# Patient Record
Sex: FEMALE | Race: WHITE | NOT HISPANIC OR LATINO | Employment: FULL TIME | URBAN - METROPOLITAN AREA
[De-identification: names, ages, dates, MRNs, and addresses within clinical notes are randomized per-mention and may not be internally consistent; named-entity substitution may affect disease eponyms.]

---

## 2019-04-18 ENCOUNTER — OFFICE VISIT (OUTPATIENT)
Dept: OBSTETRICS AND GYNECOLOGY | Facility: CLINIC | Age: 29
End: 2019-04-18
Payer: COMMERCIAL

## 2019-04-18 VITALS
BODY MASS INDEX: 26.72 KG/M2 | HEIGHT: 66 IN | SYSTOLIC BLOOD PRESSURE: 112 MMHG | DIASTOLIC BLOOD PRESSURE: 63 MMHG | WEIGHT: 166.25 LBS

## 2019-04-18 DIAGNOSIS — N92.1 BREAKTHROUGH BLEEDING ON BIRTH CONTROL PILLS: Primary | ICD-10-CM

## 2019-04-18 DIAGNOSIS — Z30.41 SURVEILLANCE OF PREVIOUSLY PRESCRIBED CONTRACEPTIVE PILL: ICD-10-CM

## 2019-04-18 LAB
B-HCG UR QL: NEGATIVE
CTP QC/QA: YES

## 2019-04-18 PROCEDURE — 99999 PR PBB SHADOW E&M-NEW PATIENT-LVL III: ICD-10-PCS | Mod: PBBFAC,,, | Performed by: NURSE PRACTITIONER

## 2019-04-18 PROCEDURE — 99203 PR OFFICE/OUTPT VISIT, NEW, LEVL III, 30-44 MIN: ICD-10-PCS | Mod: 25,S$GLB,, | Performed by: NURSE PRACTITIONER

## 2019-04-18 PROCEDURE — 81025 URINE PREGNANCY TEST: CPT | Mod: S$GLB,,, | Performed by: NURSE PRACTITIONER

## 2019-04-18 PROCEDURE — 81025 POCT URINE PREGNANCY: ICD-10-PCS | Mod: S$GLB,,, | Performed by: NURSE PRACTITIONER

## 2019-04-18 PROCEDURE — 3008F PR BODY MASS INDEX (BMI) DOCUMENTED: ICD-10-PCS | Mod: CPTII,S$GLB,, | Performed by: NURSE PRACTITIONER

## 2019-04-18 PROCEDURE — 99203 OFFICE O/P NEW LOW 30 MIN: CPT | Mod: 25,S$GLB,, | Performed by: NURSE PRACTITIONER

## 2019-04-18 PROCEDURE — 3008F BODY MASS INDEX DOCD: CPT | Mod: CPTII,S$GLB,, | Performed by: NURSE PRACTITIONER

## 2019-04-18 PROCEDURE — 99999 PR PBB SHADOW E&M-NEW PATIENT-LVL III: CPT | Mod: PBBFAC,,, | Performed by: NURSE PRACTITIONER

## 2019-04-18 RX ORDER — DROSPIRENONE AND ETHINYL ESTRADIOL 0.02-3(28)
1 KIT ORAL DAILY
Qty: 90 TABLET | Refills: 1 | Status: SHIPPED | OUTPATIENT
Start: 2019-04-18 | End: 2019-08-01

## 2019-04-18 RX ORDER — DEXTROAMPHETAMINE SACCHARATE, AMPHETAMINE ASPARTATE, DEXTROAMPHETAMINE SULFATE AND AMPHETAMINE SULFATE 5; 5; 5; 5 MG/1; MG/1; MG/1; MG/1
TABLET ORAL
Refills: 0 | COMMUNITY
Start: 2019-04-08 | End: 2021-12-17

## 2019-04-18 NOTE — PROGRESS NOTES
"  CC: BTB on pills    HPI: Pt is a 28 y.o.  female who presents for irregular bleeding with OCPs. She has been on pills since she was 19 years old. Currently on Azra and has been for about 1.5 years now. She is currently on the fourth week of pills in her pack. Started bleeding last week "like a period". She denies missing any pills in her pack. She is . She does not want STD testing today. Bleeding has stopped today. She has had light BTB before but this episode was heavier, was wearing one tampon/day. Of note, she did skip her placebo pills in the months of Jan, Feb, and March to skip her period. She got  in March and did not want her period during her honeymoon. She is questioning if that is related to her BTB. She is interested in [facility] She is due for her annual in August. She is requesting refills of her OCPs until then.     ROS:  GENERAL: Feeling well overall. Denies fever or chills.   SKIN: Denies rash or lesions.   HEAD: Denies head injury or headache.   NODES: Denies enlarged lymph nodes.   CHEST: Denies chest pain or shortness of breath.   CARDIOVASCULAR: Denies palpitations or left sided chest pain.   ABDOMEN: No abdominal pain, constipation, diarrhea, nausea, vomiting or rectal bleeding.   URINARY: No dysuria, hematuria, or burning on urination.  REPRODUCTIVE: See HPI.   BREASTS: Denies pain, lumps, or nipple discharge.   HEMATOLOGIC: No easy bruisability or excessive bleeding.   MUSCULOSKELETAL: Denies joint pain or swelling.   NEUROLOGIC: Denies syncope or weakness.   PSYCHIATRIC: Denies depression, anxiety or mood swings.    PE:   APPEARANCE: Well nourished, well developed, White female in no acute distress.  PELVIC: deferred, talk only    Diagnosis:  1. Breakthrough bleeding on birth control pills    2. Surveillance of previously prescribed contraceptive pill        Plan:     Orders Placed This Encounter    POCT urine pregnancy    " drospirenone-ethinyl estradiol (RACHEL) 3-0.02 mg per tablet     1. D/w pt BTB is most likely from skipping placebo pills the last three months. Pt chooses to stay on Rachel and will not skip placebo pills for consecutive months to avoid this in the future.   2. OCPs refilled, due for annual in August  3. Signed up for [facility]  The use of the oral contraceptive has been fully discussed with the patient. This includes the proper method to initiate and continue the pills, the need for regular compliance to ensure adequate contraceptive effect, the physiology which make the pill effective, the instructions for what to do in event of a missed pill, and warnings about anticipated minor side effects such as breakthrough spotting, nausea, breast tenderness, weight changes, acne, headaches, etc.  She has been told of the more serious potential side effects such as MI, stroke, and deep vein thrombosis, all of which are very unlikely.  She has been asked to report any signs of such serious problems immediately.  She should back up the pill with a condom during any cycle in which antibiotics are prescribed, and during the first cycle as well. The need for additional protection, such as a condom, to prevent exposure to sexually transmitted diseases has also been discussed- the patient has been clearly reminded that OCP's cannot protect her against diseases such as HIV and others. She understands and wishes to take the medication as prescribed.     Follow-up in August for MELITON

## 2019-04-21 ENCOUNTER — PATIENT MESSAGE (OUTPATIENT)
Dept: OBSTETRICS AND GYNECOLOGY | Facility: CLINIC | Age: 29
End: 2019-04-21

## 2019-06-16 ENCOUNTER — PATIENT MESSAGE (OUTPATIENT)
Dept: OBSTETRICS AND GYNECOLOGY | Facility: CLINIC | Age: 29
End: 2019-06-16

## 2019-07-05 ENCOUNTER — TELEPHONE (OUTPATIENT)
Dept: INTERNAL MEDICINE | Facility: CLINIC | Age: 29
End: 2019-07-05

## 2019-07-05 ENCOUNTER — TELEPHONE (OUTPATIENT)
Dept: PSYCHIATRY | Facility: CLINIC | Age: 29
End: 2019-07-05

## 2019-07-05 NOTE — TELEPHONE ENCOUNTER
----- Message from Destiney Wagner sent at 7/5/2019  4:04 PM CDT -----  Contact: [facility]  Appointment Request From: Patsy Jansen    With Provider: Debbie Stearns    Preferred Date Range: 7/5/2019 - 8/2/2019    Preferred Times: Thursday Afternoon, Friday Afternoon    Reason for visit: Looking for new Psychiatrist    Comments:  I am looking to make adjustments to my current medication (adderall) and lifestyle in order to better manage my mental health and depression

## 2019-07-05 NOTE — TELEPHONE ENCOUNTER
----- Message from Destiney Wagner sent at 7/5/2019  3:45 PM CDT -----  Contact: [facility]  Appointment Request From: Patsy Jansen    With Provider: Claudia Pan or any psychiatry specialist    Preferred Date Range: 7/5/2019 - 7/5/2019    Preferred Times: Any time    Reason for visit: Looking to find a new Psychiatrist    Comments:  Lifestyle feedback and mental health focused adjustments with current medication and lifestyle

## 2019-07-24 ENCOUNTER — PATIENT MESSAGE (OUTPATIENT)
Dept: OBSTETRICS AND GYNECOLOGY | Facility: CLINIC | Age: 29
End: 2019-07-24

## 2019-07-25 ENCOUNTER — TELEPHONE (OUTPATIENT)
Dept: OBSTETRICS AND GYNECOLOGY | Facility: CLINIC | Age: 29
End: 2019-07-25

## 2019-07-25 DIAGNOSIS — R53.83 FATIGUE, UNSPECIFIED TYPE: Primary | ICD-10-CM

## 2019-07-25 DIAGNOSIS — R42 LIGHTHEADED: Primary | ICD-10-CM

## 2019-07-30 ENCOUNTER — LAB VISIT (OUTPATIENT)
Dept: LAB | Facility: OTHER | Age: 29
End: 2019-07-30
Payer: COMMERCIAL

## 2019-07-30 DIAGNOSIS — R42 LIGHTHEADED: ICD-10-CM

## 2019-07-30 DIAGNOSIS — R53.83 FATIGUE, UNSPECIFIED TYPE: ICD-10-CM

## 2019-07-30 LAB
25(OH)D3+25(OH)D2 SERPL-MCNC: 33 NG/ML (ref 30–96)
BASOPHILS # BLD AUTO: 0.06 K/UL (ref 0–0.2)
BASOPHILS NFR BLD: 1 % (ref 0–1.9)
DIFFERENTIAL METHOD: ABNORMAL
EOSINOPHIL # BLD AUTO: 0.2 K/UL (ref 0–0.5)
EOSINOPHIL NFR BLD: 3.8 % (ref 0–8)
ERYTHROCYTE [DISTWIDTH] IN BLOOD BY AUTOMATED COUNT: 12.2 % (ref 11.5–14.5)
HCT VFR BLD AUTO: 41 % (ref 37–48.5)
HGB BLD-MCNC: 13.8 G/DL (ref 12–16)
IMM GRANULOCYTES # BLD AUTO: 0.01 K/UL (ref 0–0.04)
IMM GRANULOCYTES NFR BLD AUTO: 0.2 % (ref 0–0.5)
IRON SERPL-MCNC: 76 UG/DL (ref 30–160)
LYMPHOCYTES # BLD AUTO: 3.4 K/UL (ref 1–4.8)
LYMPHOCYTES NFR BLD: 53.8 % (ref 18–48)
MCH RBC QN AUTO: 30.1 PG (ref 27–31)
MCHC RBC AUTO-ENTMCNC: 33.7 G/DL (ref 32–36)
MCV RBC AUTO: 90 FL (ref 82–98)
MONOCYTES # BLD AUTO: 0.6 K/UL (ref 0.3–1)
MONOCYTES NFR BLD: 9.3 % (ref 4–15)
NEUTROPHILS # BLD AUTO: 2 K/UL (ref 1.8–7.7)
NEUTROPHILS NFR BLD: 31.9 % (ref 38–73)
NRBC BLD-RTO: 0 /100 WBC
PLATELET # BLD AUTO: 276 K/UL (ref 150–350)
PMV BLD AUTO: 9.9 FL (ref 9.2–12.9)
RBC # BLD AUTO: 4.58 M/UL (ref 4–5.4)
SATURATED IRON: 21 % (ref 20–50)
TOTAL IRON BINDING CAPACITY: 354 UG/DL (ref 250–450)
TRANSFERRIN SERPL-MCNC: 239 MG/DL (ref 200–375)
WBC # BLD AUTO: 6.26 K/UL (ref 3.9–12.7)

## 2019-07-30 PROCEDURE — 83540 ASSAY OF IRON: CPT

## 2019-07-30 PROCEDURE — 85025 COMPLETE CBC W/AUTO DIFF WBC: CPT

## 2019-07-30 PROCEDURE — 82306 VITAMIN D 25 HYDROXY: CPT

## 2019-07-30 PROCEDURE — 36415 COLL VENOUS BLD VENIPUNCTURE: CPT

## 2019-08-01 ENCOUNTER — OFFICE VISIT (OUTPATIENT)
Dept: OBSTETRICS AND GYNECOLOGY | Facility: CLINIC | Age: 29
End: 2019-08-01
Payer: COMMERCIAL

## 2019-08-01 VITALS
DIASTOLIC BLOOD PRESSURE: 74 MMHG | SYSTOLIC BLOOD PRESSURE: 122 MMHG | WEIGHT: 161.81 LBS | HEIGHT: 66 IN | BODY MASS INDEX: 26.01 KG/M2

## 2019-08-01 DIAGNOSIS — Z30.09 ENCOUNTER FOR OTHER GENERAL COUNSELING AND ADVICE ON CONTRACEPTION: Primary | ICD-10-CM

## 2019-08-01 DIAGNOSIS — R42 DIZZY SPELLS: ICD-10-CM

## 2019-08-01 PROCEDURE — 99213 OFFICE O/P EST LOW 20 MIN: CPT | Mod: S$GLB,,, | Performed by: NURSE PRACTITIONER

## 2019-08-01 PROCEDURE — 99999 PR PBB SHADOW E&M-EST. PATIENT-LVL III: CPT | Mod: PBBFAC,,, | Performed by: NURSE PRACTITIONER

## 2019-08-01 PROCEDURE — 3008F PR BODY MASS INDEX (BMI) DOCUMENTED: ICD-10-PCS | Mod: CPTII,S$GLB,, | Performed by: NURSE PRACTITIONER

## 2019-08-01 PROCEDURE — 99999 PR PBB SHADOW E&M-EST. PATIENT-LVL III: ICD-10-PCS | Mod: PBBFAC,,, | Performed by: NURSE PRACTITIONER

## 2019-08-01 PROCEDURE — 3008F BODY MASS INDEX DOCD: CPT | Mod: CPTII,S$GLB,, | Performed by: NURSE PRACTITIONER

## 2019-08-01 PROCEDURE — 99213 PR OFFICE/OUTPT VISIT, EST, LEVL III, 20-29 MIN: ICD-10-PCS | Mod: S$GLB,,, | Performed by: NURSE PRACTITIONER

## 2019-08-01 NOTE — PROGRESS NOTES
"  CC: birth control, anemia?    HPI: Pt is a 28 y.o.  female who presents to discuss birth control. Pt was on OCPs, but stopped them last week. Decided her body needs a break from hormones and was having BTB with them. Hx of anxiety and depression, recently seen by psych. Adderall helps her fatigue and "mental fogginess". Concerned OCPs were adding to mood swings. Will use condoms for birth control. Possibly interested in an IUD but has heard some bad stories about them. Reports episodes of dizziness a few times a month. Usually happens if she sits up too fast or sometimes with dead lifts in the gym. Originally concerned her vitamin D or iron was low. Recent labs showed CBC, iron, and Vitamin D all within normal limits. Her mother and sister have the same dizzy episodes as well. Needs to establish care with PCP.     ROS:  GENERAL: Feeling well overall. Denies fever or chills.   SKIN: Denies rash or lesions.   HEAD: Denies head injury or headache.   NODES: Denies enlarged lymph nodes.   CHEST: Denies chest pain or shortness of breath.   CARDIOVASCULAR: Denies palpitations or left sided chest pain.   ABDOMEN: No abdominal pain, constipation, diarrhea, nausea, vomiting or rectal bleeding.   URINARY: No dysuria, hematuria, or burning on urination.  REPRODUCTIVE: See HPI.   BREASTS: Denies pain, lumps, or nipple discharge.   HEMATOLOGIC: No easy bruisability or excessive bleeding.   MUSCULOSKELETAL: Denies joint pain or swelling.   NEUROLOGIC: Denies syncope or weakness.   PSYCHIATRIC: Denies depression, anxiety or mood swings.    PE:   APPEARANCE: Well nourished, well developed,   White female in no acute distress.  PELVIC: deferred, talk only    Diagnosis:  1. Encounter for other general counseling and advice on contraception    2. Dizzy spells        Plan:   1. Patient was counseled today on contraceptive options: barrier, hormonal (OCPs, Depo-Provera, NuvaRing, Nexplanon), IUDs (Mirena, ParaGard), etc. D/w pt " non-hormonal IUD option if she is not happy with condoms.   2. PCP recommendations given along with phone number to schedule  3. Reviewed lab results with pt, gave option of adding prenatal or multivitamin with iron    Total duration face to face visit time 15 minutes.     Follow-up prn

## 2021-10-26 ENCOUNTER — OFFICE VISIT (OUTPATIENT)
Dept: OBSTETRICS AND GYNECOLOGY | Facility: CLINIC | Age: 31
End: 2021-10-26
Attending: OBSTETRICS & GYNECOLOGY
Payer: COMMERCIAL

## 2021-10-26 VITALS
SYSTOLIC BLOOD PRESSURE: 116 MMHG | DIASTOLIC BLOOD PRESSURE: 60 MMHG | WEIGHT: 181.88 LBS | HEIGHT: 66 IN | BODY MASS INDEX: 29.23 KG/M2

## 2021-10-26 DIAGNOSIS — Z32.01 PREGNANCY TEST POSITIVE: ICD-10-CM

## 2021-10-26 DIAGNOSIS — Z12.4 PAP SMEAR FOR CERVICAL CANCER SCREENING: ICD-10-CM

## 2021-10-26 DIAGNOSIS — N91.4 SECONDARY OLIGOMENORRHEA: Primary | ICD-10-CM

## 2021-10-26 LAB
B-HCG UR QL: POSITIVE
CTP QC/QA: YES

## 2021-10-26 PROCEDURE — 3008F PR BODY MASS INDEX (BMI) DOCUMENTED: ICD-10-PCS | Mod: CPTII,S$GLB,, | Performed by: OBSTETRICS & GYNECOLOGY

## 2021-10-26 PROCEDURE — 87591 N.GONORRHOEAE DNA AMP PROB: CPT | Performed by: OBSTETRICS & GYNECOLOGY

## 2021-10-26 PROCEDURE — 81025 POCT URINE PREGNANCY: ICD-10-PCS | Mod: S$GLB,,, | Performed by: OBSTETRICS & GYNECOLOGY

## 2021-10-26 PROCEDURE — 99214 PR OFFICE/OUTPT VISIT, EST, LEVL IV, 30-39 MIN: ICD-10-PCS | Mod: 25,S$GLB,, | Performed by: OBSTETRICS & GYNECOLOGY

## 2021-10-26 PROCEDURE — 3074F SYST BP LT 130 MM HG: CPT | Mod: CPTII,S$GLB,, | Performed by: OBSTETRICS & GYNECOLOGY

## 2021-10-26 PROCEDURE — 99214 OFFICE O/P EST MOD 30 MIN: CPT | Mod: 25,S$GLB,, | Performed by: OBSTETRICS & GYNECOLOGY

## 2021-10-26 PROCEDURE — 3074F PR MOST RECENT SYSTOLIC BLOOD PRESSURE < 130 MM HG: ICD-10-PCS | Mod: CPTII,S$GLB,, | Performed by: OBSTETRICS & GYNECOLOGY

## 2021-10-26 PROCEDURE — 1159F PR MEDICATION LIST DOCUMENTED IN MEDICAL RECORD: ICD-10-PCS | Mod: CPTII,S$GLB,, | Performed by: OBSTETRICS & GYNECOLOGY

## 2021-10-26 PROCEDURE — 88175 CYTOPATH C/V AUTO FLUID REDO: CPT | Performed by: OBSTETRICS & GYNECOLOGY

## 2021-10-26 PROCEDURE — 87491 CHLMYD TRACH DNA AMP PROBE: CPT | Performed by: OBSTETRICS & GYNECOLOGY

## 2021-10-26 PROCEDURE — 3008F BODY MASS INDEX DOCD: CPT | Mod: CPTII,S$GLB,, | Performed by: OBSTETRICS & GYNECOLOGY

## 2021-10-26 PROCEDURE — 1159F MED LIST DOCD IN RCRD: CPT | Mod: CPTII,S$GLB,, | Performed by: OBSTETRICS & GYNECOLOGY

## 2021-10-26 PROCEDURE — 99999 PR PBB SHADOW E&M-EST. PATIENT-LVL III: ICD-10-PCS | Mod: PBBFAC,,, | Performed by: OBSTETRICS & GYNECOLOGY

## 2021-10-26 PROCEDURE — 1160F PR REVIEW ALL MEDS BY PRESCRIBER/CLIN PHARMACIST DOCUMENTED: ICD-10-PCS | Mod: CPTII,S$GLB,, | Performed by: OBSTETRICS & GYNECOLOGY

## 2021-10-26 PROCEDURE — 81025 URINE PREGNANCY TEST: CPT | Mod: S$GLB,,, | Performed by: OBSTETRICS & GYNECOLOGY

## 2021-10-26 PROCEDURE — 3078F PR MOST RECENT DIASTOLIC BLOOD PRESSURE < 80 MM HG: ICD-10-PCS | Mod: CPTII,S$GLB,, | Performed by: OBSTETRICS & GYNECOLOGY

## 2021-10-26 PROCEDURE — 3078F DIAST BP <80 MM HG: CPT | Mod: CPTII,S$GLB,, | Performed by: OBSTETRICS & GYNECOLOGY

## 2021-10-26 PROCEDURE — 1160F RVW MEDS BY RX/DR IN RCRD: CPT | Mod: CPTII,S$GLB,, | Performed by: OBSTETRICS & GYNECOLOGY

## 2021-10-26 PROCEDURE — 87086 URINE CULTURE/COLONY COUNT: CPT | Performed by: OBSTETRICS & GYNECOLOGY

## 2021-10-26 PROCEDURE — 99999 PR PBB SHADOW E&M-EST. PATIENT-LVL III: CPT | Mod: PBBFAC,,, | Performed by: OBSTETRICS & GYNECOLOGY

## 2021-10-28 LAB
BACTERIA UR CULT: NO GROWTH
C TRACH DNA SPEC QL NAA+PROBE: NOT DETECTED
N GONORRHOEA DNA SPEC QL NAA+PROBE: NOT DETECTED

## 2021-11-02 ENCOUNTER — PATIENT MESSAGE (OUTPATIENT)
Dept: OBSTETRICS AND GYNECOLOGY | Facility: CLINIC | Age: 31
End: 2021-11-02
Payer: COMMERCIAL

## 2021-11-05 ENCOUNTER — PROCEDURE VISIT (OUTPATIENT)
Dept: OBSTETRICS AND GYNECOLOGY | Facility: CLINIC | Age: 31
End: 2021-11-05
Attending: OBSTETRICS & GYNECOLOGY
Payer: COMMERCIAL

## 2021-11-05 DIAGNOSIS — Z32.01 PREGNANCY TEST POSITIVE: ICD-10-CM

## 2021-11-05 DIAGNOSIS — N91.4 SECONDARY OLIGOMENORRHEA: ICD-10-CM

## 2021-11-05 PROCEDURE — 76801 OB US < 14 WKS SINGLE FETUS: CPT | Mod: PBBFAC | Performed by: OBSTETRICS & GYNECOLOGY

## 2021-11-19 ENCOUNTER — INITIAL PRENATAL (OUTPATIENT)
Dept: OBSTETRICS AND GYNECOLOGY | Facility: CLINIC | Age: 31
End: 2021-11-19
Attending: OBSTETRICS & GYNECOLOGY
Payer: COMMERCIAL

## 2021-11-19 ENCOUNTER — LAB VISIT (OUTPATIENT)
Dept: LAB | Facility: OTHER | Age: 31
End: 2021-11-19
Attending: OBSTETRICS & GYNECOLOGY
Payer: COMMERCIAL

## 2021-11-19 VITALS
SYSTOLIC BLOOD PRESSURE: 114 MMHG | DIASTOLIC BLOOD PRESSURE: 62 MMHG | WEIGHT: 195.13 LBS | BODY MASS INDEX: 31.49 KG/M2

## 2021-11-19 DIAGNOSIS — N91.4 SECONDARY OLIGOMENORRHEA: ICD-10-CM

## 2021-11-19 DIAGNOSIS — Z32.01 PREGNANCY TEST POSITIVE: ICD-10-CM

## 2021-11-19 DIAGNOSIS — Z34.01 ENCOUNTER FOR SUPERVISION OF NORMAL FIRST PREGNANCY IN FIRST TRIMESTER: Primary | ICD-10-CM

## 2021-11-19 LAB
ABO + RH BLD: NORMAL
ANION GAP SERPL CALC-SCNC: 10 MMOL/L (ref 8–16)
BASOPHILS # BLD AUTO: 0.08 K/UL (ref 0–0.2)
BASOPHILS NFR BLD: 0.9 % (ref 0–1.9)
BLD GP AB SCN CELLS X3 SERPL QL: NORMAL
BUN SERPL-MCNC: 15 MG/DL (ref 6–20)
CALCIUM SERPL-MCNC: 9.4 MG/DL (ref 8.7–10.5)
CHLORIDE SERPL-SCNC: 104 MMOL/L (ref 95–110)
CO2 SERPL-SCNC: 21 MMOL/L (ref 23–29)
CREAT SERPL-MCNC: 0.9 MG/DL (ref 0.5–1.4)
DIFFERENTIAL METHOD: NORMAL
EOSINOPHIL # BLD AUTO: 0.1 K/UL (ref 0–0.5)
EOSINOPHIL NFR BLD: 1.5 % (ref 0–8)
ERYTHROCYTE [DISTWIDTH] IN BLOOD BY AUTOMATED COUNT: 13.2 % (ref 11.5–14.5)
EST. GFR  (AFRICAN AMERICAN): >60 ML/MIN/1.73 M^2
EST. GFR  (NON AFRICAN AMERICAN): >60 ML/MIN/1.73 M^2
GLUCOSE SERPL-MCNC: 86 MG/DL (ref 70–110)
HCT VFR BLD AUTO: 37.9 % (ref 37–48.5)
HGB BLD-MCNC: 12.6 G/DL (ref 12–16)
IMM GRANULOCYTES # BLD AUTO: 0.04 K/UL (ref 0–0.04)
IMM GRANULOCYTES NFR BLD AUTO: 0.5 % (ref 0–0.5)
LYMPHOCYTES # BLD AUTO: 2.7 K/UL (ref 1–4.8)
LYMPHOCYTES NFR BLD: 30.8 % (ref 18–48)
MCH RBC QN AUTO: 31 PG (ref 27–31)
MCHC RBC AUTO-ENTMCNC: 33.2 G/DL (ref 32–36)
MCV RBC AUTO: 93 FL (ref 82–98)
MONOCYTES # BLD AUTO: 0.9 K/UL (ref 0.3–1)
MONOCYTES NFR BLD: 10.7 % (ref 4–15)
NEUTROPHILS # BLD AUTO: 4.9 K/UL (ref 1.8–7.7)
NEUTROPHILS NFR BLD: 55.6 % (ref 38–73)
NRBC BLD-RTO: 0 /100 WBC
PLATELET # BLD AUTO: 286 K/UL (ref 150–450)
PMV BLD AUTO: 9.2 FL (ref 9.2–12.9)
POTASSIUM SERPL-SCNC: 4.9 MMOL/L (ref 3.5–5.1)
RBC # BLD AUTO: 4.07 M/UL (ref 4–5.4)
SODIUM SERPL-SCNC: 135 MMOL/L (ref 136–145)
WBC # BLD AUTO: 8.79 K/UL (ref 3.9–12.7)

## 2021-11-19 PROCEDURE — 86900 BLOOD TYPING SEROLOGIC ABO: CPT | Performed by: OBSTETRICS & GYNECOLOGY

## 2021-11-19 PROCEDURE — 87389 HIV-1 AG W/HIV-1&-2 AB AG IA: CPT | Performed by: OBSTETRICS & GYNECOLOGY

## 2021-11-19 PROCEDURE — 86592 SYPHILIS TEST NON-TREP QUAL: CPT | Performed by: OBSTETRICS & GYNECOLOGY

## 2021-11-19 PROCEDURE — 87340 HEPATITIS B SURFACE AG IA: CPT | Performed by: OBSTETRICS & GYNECOLOGY

## 2021-11-19 PROCEDURE — 99999 PR PBB SHADOW E&M-EST. PATIENT-LVL II: CPT | Mod: PBBFAC,,, | Performed by: OBSTETRICS & GYNECOLOGY

## 2021-11-19 PROCEDURE — 99999 PR PBB SHADOW E&M-EST. PATIENT-LVL II: ICD-10-PCS | Mod: PBBFAC,,, | Performed by: OBSTETRICS & GYNECOLOGY

## 2021-11-19 PROCEDURE — 36415 COLL VENOUS BLD VENIPUNCTURE: CPT | Performed by: OBSTETRICS & GYNECOLOGY

## 2021-11-19 PROCEDURE — 85025 COMPLETE CBC W/AUTO DIFF WBC: CPT | Performed by: OBSTETRICS & GYNECOLOGY

## 2021-11-19 PROCEDURE — 86762 RUBELLA ANTIBODY: CPT | Performed by: OBSTETRICS & GYNECOLOGY

## 2021-11-19 PROCEDURE — 80048 BASIC METABOLIC PNL TOTAL CA: CPT | Performed by: OBSTETRICS & GYNECOLOGY

## 2021-11-19 PROCEDURE — 0500F PR INITIAL PRENATAL CARE VISIT: ICD-10-PCS | Mod: CPTII,S$GLB,, | Performed by: OBSTETRICS & GYNECOLOGY

## 2021-11-19 PROCEDURE — 0500F INITIAL PRENATAL CARE VISIT: CPT | Mod: CPTII,S$GLB,, | Performed by: OBSTETRICS & GYNECOLOGY

## 2021-11-19 PROCEDURE — 81220 CFTR GENE COM VARIANTS: CPT | Performed by: OBSTETRICS & GYNECOLOGY

## 2021-11-19 RX ORDER — DAPSONE 75 MG/G
GEL TOPICAL
COMMUNITY
Start: 2021-08-25 | End: 2021-12-17

## 2021-11-22 ENCOUNTER — TELEPHONE (OUTPATIENT)
Dept: OBSTETRICS AND GYNECOLOGY | Facility: CLINIC | Age: 31
End: 2021-11-22
Payer: COMMERCIAL

## 2021-11-22 LAB
HBV SURFACE AG SERPL QL IA: NEGATIVE
HIV 1+2 AB+HIV1 P24 AG SERPL QL IA: NEGATIVE
RPR SER QL: NORMAL
RUBV IGG SER-ACNC: 17.2 IU/ML
RUBV IGG SER-IMP: REACTIVE

## 2021-11-24 LAB — CFTR MUT ANL BLD/T: NORMAL

## 2021-11-29 ENCOUNTER — TELEPHONE (OUTPATIENT)
Dept: MATERNAL FETAL MEDICINE | Facility: CLINIC | Age: 31
End: 2021-11-29
Payer: COMMERCIAL

## 2021-12-03 ENCOUNTER — TELEPHONE (OUTPATIENT)
Dept: OBSTETRICS AND GYNECOLOGY | Facility: CLINIC | Age: 31
End: 2021-12-03
Payer: COMMERCIAL

## 2021-12-08 ENCOUNTER — PATIENT MESSAGE (OUTPATIENT)
Dept: OBSTETRICS AND GYNECOLOGY | Facility: CLINIC | Age: 31
End: 2021-12-08
Payer: COMMERCIAL

## 2021-12-16 ENCOUNTER — TELEPHONE (OUTPATIENT)
Dept: OBSTETRICS AND GYNECOLOGY | Facility: CLINIC | Age: 31
End: 2021-12-16
Payer: COMMERCIAL

## 2021-12-17 ENCOUNTER — ROUTINE PRENATAL (OUTPATIENT)
Dept: OBSTETRICS AND GYNECOLOGY | Facility: CLINIC | Age: 31
End: 2021-12-17
Attending: OBSTETRICS & GYNECOLOGY
Payer: COMMERCIAL

## 2021-12-17 ENCOUNTER — PATIENT MESSAGE (OUTPATIENT)
Dept: ADMINISTRATIVE | Facility: OTHER | Age: 31
End: 2021-12-17
Payer: COMMERCIAL

## 2021-12-17 VITALS
DIASTOLIC BLOOD PRESSURE: 64 MMHG | SYSTOLIC BLOOD PRESSURE: 100 MMHG | BODY MASS INDEX: 32.74 KG/M2 | WEIGHT: 202.81 LBS

## 2021-12-17 DIAGNOSIS — Z3A.11 PREGNANCY WITH 11 COMPLETED WEEKS GESTATION: ICD-10-CM

## 2021-12-17 DIAGNOSIS — Z34.01 ENCOUNTER FOR SUPERVISION OF NORMAL FIRST PREGNANCY IN FIRST TRIMESTER: Primary | ICD-10-CM

## 2021-12-17 PROCEDURE — 0502F SUBSEQUENT PRENATAL CARE: CPT | Mod: CPTII,S$GLB,, | Performed by: OBSTETRICS & GYNECOLOGY

## 2021-12-17 PROCEDURE — 99999 PR PBB SHADOW E&M-EST. PATIENT-LVL II: CPT | Mod: PBBFAC,,, | Performed by: OBSTETRICS & GYNECOLOGY

## 2021-12-17 PROCEDURE — 99999 PR PBB SHADOW E&M-EST. PATIENT-LVL II: ICD-10-PCS | Mod: PBBFAC,,, | Performed by: OBSTETRICS & GYNECOLOGY

## 2021-12-17 PROCEDURE — 0502F PR SUBSEQUENT PRENATAL CARE: ICD-10-PCS | Mod: CPTII,S$GLB,, | Performed by: OBSTETRICS & GYNECOLOGY

## 2021-12-22 ENCOUNTER — TELEPHONE (OUTPATIENT)
Dept: OBSTETRICS AND GYNECOLOGY | Facility: CLINIC | Age: 31
End: 2021-12-22
Payer: COMMERCIAL

## 2021-12-24 ENCOUNTER — PATIENT MESSAGE (OUTPATIENT)
Dept: OBSTETRICS AND GYNECOLOGY | Facility: CLINIC | Age: 31
End: 2021-12-24
Payer: COMMERCIAL

## 2021-12-24 ENCOUNTER — PATIENT MESSAGE (OUTPATIENT)
Dept: ADMINISTRATIVE | Facility: OTHER | Age: 31
End: 2021-12-24
Payer: COMMERCIAL

## 2021-12-27 ENCOUNTER — PATIENT MESSAGE (OUTPATIENT)
Dept: ADMINISTRATIVE | Facility: OTHER | Age: 31
End: 2021-12-27
Payer: COMMERCIAL

## 2022-01-13 ENCOUNTER — ROUTINE PRENATAL (OUTPATIENT)
Dept: OBSTETRICS AND GYNECOLOGY | Facility: CLINIC | Age: 32
End: 2022-01-13
Attending: OBSTETRICS & GYNECOLOGY
Payer: COMMERCIAL

## 2022-01-13 VITALS
DIASTOLIC BLOOD PRESSURE: 80 MMHG | SYSTOLIC BLOOD PRESSURE: 110 MMHG | BODY MASS INDEX: 33.98 KG/M2 | WEIGHT: 210.56 LBS

## 2022-01-13 DIAGNOSIS — Z34.02 ENCOUNTER FOR SUPERVISION OF NORMAL FIRST PREGNANCY IN SECOND TRIMESTER: Primary | ICD-10-CM

## 2022-01-13 PROCEDURE — 0502F PR SUBSEQUENT PRENATAL CARE: ICD-10-PCS | Mod: CPTII,S$GLB,, | Performed by: OBSTETRICS & GYNECOLOGY

## 2022-01-13 PROCEDURE — 0502F SUBSEQUENT PRENATAL CARE: CPT | Mod: CPTII,S$GLB,, | Performed by: OBSTETRICS & GYNECOLOGY

## 2022-01-13 PROCEDURE — 99999 PR PBB SHADOW E&M-EST. PATIENT-LVL II: CPT | Mod: PBBFAC,,, | Performed by: OBSTETRICS & GYNECOLOGY

## 2022-01-13 PROCEDURE — 99999 PR PBB SHADOW E&M-EST. PATIENT-LVL II: ICD-10-PCS | Mod: PBBFAC,,, | Performed by: OBSTETRICS & GYNECOLOGY

## 2022-01-13 NOTE — PROGRESS NOTES
Denies bleeding and cramping.  +FHTs with doppler.  Reports recent Covid- doing well now.  Plans to receive Covid booster.  Reports episode of dizziness - discussed.  [facility] anatomical survey 2/10/22.  Return 4 weeks.

## 2022-01-14 ENCOUNTER — TELEPHONE (OUTPATIENT)
Dept: OBSTETRICS AND GYNECOLOGY | Facility: CLINIC | Age: 32
End: 2022-01-14
Payer: COMMERCIAL

## 2022-01-14 NOTE — TELEPHONE ENCOUNTER
----- Message from Miranda Cortez sent at 1/14/2022 10:13 AM CST -----  Pt would like a copy of M21 results with gender.    Best contact [phone]

## 2022-02-09 ENCOUNTER — PATIENT MESSAGE (OUTPATIENT)
Dept: MATERNAL FETAL MEDICINE | Facility: CLINIC | Age: 32
End: 2022-02-09
Payer: COMMERCIAL

## 2022-02-10 ENCOUNTER — ROUTINE PRENATAL (OUTPATIENT)
Dept: OBSTETRICS AND GYNECOLOGY | Facility: CLINIC | Age: 32
End: 2022-02-10
Attending: OBSTETRICS & GYNECOLOGY
Payer: COMMERCIAL

## 2022-02-10 ENCOUNTER — PROCEDURE VISIT (OUTPATIENT)
Dept: MATERNAL FETAL MEDICINE | Facility: CLINIC | Age: 32
End: 2022-02-10
Attending: OBSTETRICS & GYNECOLOGY
Payer: COMMERCIAL

## 2022-02-10 VITALS
DIASTOLIC BLOOD PRESSURE: 60 MMHG | BODY MASS INDEX: 35.23 KG/M2 | WEIGHT: 218.25 LBS | SYSTOLIC BLOOD PRESSURE: 114 MMHG

## 2022-02-10 DIAGNOSIS — Z36.89 ENCOUNTER FOR ULTRASOUND TO ASSESS FETAL GROWTH: ICD-10-CM

## 2022-02-10 DIAGNOSIS — Z34.01 ENCOUNTER FOR SUPERVISION OF NORMAL FIRST PREGNANCY IN FIRST TRIMESTER: ICD-10-CM

## 2022-02-10 DIAGNOSIS — Z34.02 ENCOUNTER FOR SUPERVISION OF NORMAL FIRST PREGNANCY IN SECOND TRIMESTER: Primary | ICD-10-CM

## 2022-02-10 DIAGNOSIS — Z36.2 ENCOUNTER FOR FOLLOW-UP ULTRASOUND OF FETAL ANATOMY: Primary | ICD-10-CM

## 2022-02-10 PROCEDURE — 99999 PR PBB SHADOW E&M-EST. PATIENT-LVL III: CPT | Mod: PBBFAC,,, | Performed by: OBSTETRICS & GYNECOLOGY

## 2022-02-10 PROCEDURE — 0502F PR SUBSEQUENT PRENATAL CARE: ICD-10-PCS | Mod: CPTII,S$GLB,, | Performed by: OBSTETRICS & GYNECOLOGY

## 2022-02-10 PROCEDURE — 99999 PR PBB SHADOW E&M-EST. PATIENT-LVL III: ICD-10-PCS | Mod: PBBFAC,,, | Performed by: OBSTETRICS & GYNECOLOGY

## 2022-02-10 PROCEDURE — 76805 OB US >/= 14 WKS SNGL FETUS: CPT | Mod: S$GLB,,, | Performed by: OBSTETRICS & GYNECOLOGY

## 2022-02-10 PROCEDURE — 0502F SUBSEQUENT PRENATAL CARE: CPT | Mod: CPTII,S$GLB,, | Performed by: OBSTETRICS & GYNECOLOGY

## 2022-02-10 PROCEDURE — 76805 US MFM PROCEDURE (VIEWPOINT): ICD-10-PCS | Mod: S$GLB,,, | Performed by: OBSTETRICS & GYNECOLOGY

## 2022-02-10 NOTE — PROGRESS NOTES
Reports +FM.  Denies bleeding and cramping.  +FHTs with doppler.  Reviewed today's [facility] sono with follow-up sono 3/30/22 to complete anatomic survey.  We reviewed weight gain.  We also discussed pregnancy, work, school, stress, etc.  Return 4 weeks.

## 2022-02-18 ENCOUNTER — ROUTINE PRENATAL (OUTPATIENT)
Dept: OBSTETRICS AND GYNECOLOGY | Facility: CLINIC | Age: 32
End: 2022-02-18
Attending: OBSTETRICS & GYNECOLOGY
Payer: COMMERCIAL

## 2022-02-18 ENCOUNTER — PATIENT MESSAGE (OUTPATIENT)
Dept: OBSTETRICS AND GYNECOLOGY | Facility: CLINIC | Age: 32
End: 2022-02-18

## 2022-02-18 VITALS
WEIGHT: 222.69 LBS | BODY MASS INDEX: 35.94 KG/M2 | SYSTOLIC BLOOD PRESSURE: 110 MMHG | DIASTOLIC BLOOD PRESSURE: 80 MMHG

## 2022-02-18 DIAGNOSIS — Z34.02 ENCOUNTER FOR SUPERVISION OF NORMAL FIRST PREGNANCY IN SECOND TRIMESTER: Primary | ICD-10-CM

## 2022-02-18 PROCEDURE — 0502F PR SUBSEQUENT PRENATAL CARE: ICD-10-PCS | Mod: CPTII,S$GLB,, | Performed by: OBSTETRICS & GYNECOLOGY

## 2022-02-18 PROCEDURE — 0502F SUBSEQUENT PRENATAL CARE: CPT | Mod: CPTII,S$GLB,, | Performed by: OBSTETRICS & GYNECOLOGY

## 2022-02-18 PROCEDURE — 99999 PR PBB SHADOW E&M-EST. PATIENT-LVL III: ICD-10-PCS | Mod: PBBFAC,,, | Performed by: OBSTETRICS & GYNECOLOGY

## 2022-02-18 PROCEDURE — 99999 PR PBB SHADOW E&M-EST. PATIENT-LVL III: CPT | Mod: PBBFAC,,, | Performed by: OBSTETRICS & GYNECOLOGY

## 2022-02-18 NOTE — PROGRESS NOTES
Added today for evaluation of pedal edema.  Reports +FM.  Denies bleeding and cramping.  She reports the development of edema in her lower extremities yesterday, R>L.  Today, she has less swelling.  BP and urine are normal.  PE: bilateral pedal edema: R=L.  No calf pain or tenderness bilaterally.  We discussed strategies to help reduce edema.  Reduce sodium intake, increase water.  Return as scheduled.

## 2022-03-29 ENCOUNTER — PATIENT MESSAGE (OUTPATIENT)
Dept: MATERNAL FETAL MEDICINE | Facility: CLINIC | Age: 32
End: 2022-03-29
Payer: COMMERCIAL

## 2022-03-30 ENCOUNTER — TELEPHONE (OUTPATIENT)
Dept: MATERNAL FETAL MEDICINE | Facility: CLINIC | Age: 32
End: 2022-03-30

## 2022-03-30 ENCOUNTER — TELEPHONE (OUTPATIENT)
Dept: PEDIATRIC CARDIOLOGY | Facility: CLINIC | Age: 32
End: 2022-03-30
Payer: COMMERCIAL

## 2022-03-30 ENCOUNTER — PROCEDURE VISIT (OUTPATIENT)
Dept: MATERNAL FETAL MEDICINE | Facility: CLINIC | Age: 32
End: 2022-03-30
Payer: COMMERCIAL

## 2022-03-30 DIAGNOSIS — Z03.73 ENCOUNTER FOR SUSPECTED FETAL ANOMALY, RULED OUT: Primary | ICD-10-CM

## 2022-03-30 DIAGNOSIS — Z36.89 ENCOUNTER FOR ULTRASOUND TO ASSESS FETAL GROWTH: ICD-10-CM

## 2022-03-30 DIAGNOSIS — Z36.2 ENCOUNTER FOR FOLLOW-UP ULTRASOUND OF FETAL ANATOMY: ICD-10-CM

## 2022-03-30 PROCEDURE — 76816 OB US FOLLOW-UP PER FETUS: CPT | Mod: S$GLB,,, | Performed by: OBSTETRICS & GYNECOLOGY

## 2022-03-30 PROCEDURE — 76816 US MFM PROCEDURE (VIEWPOINT): ICD-10-PCS | Mod: S$GLB,,, | Performed by: OBSTETRICS & GYNECOLOGY

## 2022-03-30 NOTE — TELEPHONE ENCOUNTER
Message was sent today (3/30/22) to Pediatric Cardiology for this patient to have a Fetal Echo for Rule out VSD per Dr. Ferro and the order is placed.

## 2022-03-31 ENCOUNTER — ROUTINE PRENATAL (OUTPATIENT)
Dept: OBSTETRICS AND GYNECOLOGY | Facility: CLINIC | Age: 32
End: 2022-03-31
Attending: OBSTETRICS & GYNECOLOGY
Payer: COMMERCIAL

## 2022-03-31 VITALS
WEIGHT: 234.38 LBS | SYSTOLIC BLOOD PRESSURE: 122 MMHG | BODY MASS INDEX: 37.82 KG/M2 | DIASTOLIC BLOOD PRESSURE: 72 MMHG

## 2022-03-31 DIAGNOSIS — Z34.02 ENCOUNTER FOR SUPERVISION OF NORMAL FIRST PREGNANCY IN SECOND TRIMESTER: Primary | ICD-10-CM

## 2022-03-31 DIAGNOSIS — Z3A.26 PREGNANCY WITH 26 COMPLETED WEEKS GESTATION: ICD-10-CM

## 2022-03-31 DIAGNOSIS — N76.0 ACUTE VAGINITIS: ICD-10-CM

## 2022-03-31 PROCEDURE — 0502F PR SUBSEQUENT PRENATAL CARE: ICD-10-PCS | Mod: CPTII,S$GLB,, | Performed by: OBSTETRICS & GYNECOLOGY

## 2022-03-31 PROCEDURE — 0502F SUBSEQUENT PRENATAL CARE: CPT | Mod: CPTII,S$GLB,, | Performed by: OBSTETRICS & GYNECOLOGY

## 2022-03-31 PROCEDURE — 99999 PR PBB SHADOW E&M-EST. PATIENT-LVL II: ICD-10-PCS | Mod: PBBFAC,,, | Performed by: OBSTETRICS & GYNECOLOGY

## 2022-03-31 PROCEDURE — 99999 PR PBB SHADOW E&M-EST. PATIENT-LVL II: CPT | Mod: PBBFAC,,, | Performed by: OBSTETRICS & GYNECOLOGY

## 2022-03-31 PROCEDURE — 87481 CANDIDA DNA AMP PROBE: CPT | Mod: 59 | Performed by: OBSTETRICS & GYNECOLOGY

## 2022-03-31 NOTE — PROGRESS NOTES
Reports good FM.  Denies bleeding and cramping.  +FHTs with doppler.  Reports irritation / burning with IC- Affirm performed.  Cx visually closed.  Reviewed 26 week MFM sono with EFW 56% and possible outlet VSD- peds cards sono 4/7/22.  Discussed weight gain.  DM screen, CBC tomorrow.  Return 3 weeks.

## 2022-04-01 ENCOUNTER — LAB VISIT (OUTPATIENT)
Dept: LAB | Facility: OTHER | Age: 32
End: 2022-04-01
Attending: OBSTETRICS & GYNECOLOGY
Payer: COMMERCIAL

## 2022-04-01 ENCOUNTER — TELEPHONE (OUTPATIENT)
Dept: OBSTETRICS AND GYNECOLOGY | Facility: CLINIC | Age: 32
End: 2022-04-01
Payer: COMMERCIAL

## 2022-04-01 DIAGNOSIS — Z3A.26 PREGNANCY WITH 26 COMPLETED WEEKS GESTATION: ICD-10-CM

## 2022-04-01 DIAGNOSIS — O99.810 ABNORMAL GLUCOSE AFFECTING PREGNANCY: Primary | ICD-10-CM

## 2022-04-01 DIAGNOSIS — Z34.02 ENCOUNTER FOR SUPERVISION OF NORMAL FIRST PREGNANCY IN SECOND TRIMESTER: ICD-10-CM

## 2022-04-01 LAB
BASOPHILS # BLD AUTO: 0.04 K/UL (ref 0–0.2)
BASOPHILS NFR BLD: 0.4 % (ref 0–1.9)
DIFFERENTIAL METHOD: ABNORMAL
EOSINOPHIL # BLD AUTO: 0.2 K/UL (ref 0–0.5)
EOSINOPHIL NFR BLD: 1.7 % (ref 0–8)
ERYTHROCYTE [DISTWIDTH] IN BLOOD BY AUTOMATED COUNT: 13.5 % (ref 11.5–14.5)
GLUCOSE SERPL-MCNC: 170 MG/DL (ref 70–140)
HCT VFR BLD AUTO: 36.8 % (ref 37–48.5)
HGB BLD-MCNC: 12.2 G/DL (ref 12–16)
IMM GRANULOCYTES # BLD AUTO: 0.12 K/UL (ref 0–0.04)
IMM GRANULOCYTES NFR BLD AUTO: 1.3 % (ref 0–0.5)
LYMPHOCYTES # BLD AUTO: 2.2 K/UL (ref 1–4.8)
LYMPHOCYTES NFR BLD: 23.6 % (ref 18–48)
MCH RBC QN AUTO: 31.4 PG (ref 27–31)
MCHC RBC AUTO-ENTMCNC: 33.2 G/DL (ref 32–36)
MCV RBC AUTO: 95 FL (ref 82–98)
MONOCYTES # BLD AUTO: 0.8 K/UL (ref 0.3–1)
MONOCYTES NFR BLD: 8.2 % (ref 4–15)
NEUTROPHILS # BLD AUTO: 6 K/UL (ref 1.8–7.7)
NEUTROPHILS NFR BLD: 64.8 % (ref 38–73)
NRBC BLD-RTO: 0 /100 WBC
PLATELET # BLD AUTO: 268 K/UL (ref 150–450)
PMV BLD AUTO: 9.1 FL (ref 9.2–12.9)
RBC # BLD AUTO: 3.88 M/UL (ref 4–5.4)
WBC # BLD AUTO: 9.23 K/UL (ref 3.9–12.7)

## 2022-04-01 PROCEDURE — 36415 COLL VENOUS BLD VENIPUNCTURE: CPT | Performed by: OBSTETRICS & GYNECOLOGY

## 2022-04-01 PROCEDURE — 82950 GLUCOSE TEST: CPT | Performed by: OBSTETRICS & GYNECOLOGY

## 2022-04-01 PROCEDURE — 85025 COMPLETE CBC W/AUTO DIFF WBC: CPT | Performed by: OBSTETRICS & GYNECOLOGY

## 2022-04-01 NOTE — TELEPHONE ENCOUNTER
Called patient:    Discuss results of diabetic screen:  170    Rec 3 hour GTT    [facility]    To reduce carb and sugar intake.

## 2022-04-04 LAB
BACTERIAL VAGINOSIS DNA: NEGATIVE
CANDIDA GLABRATA DNA: NEGATIVE
CANDIDA KRUSEI DNA: NEGATIVE
CANDIDA RRNA VAG QL PROBE: POSITIVE
T VAGINALIS RRNA GENITAL QL PROBE: NEGATIVE

## 2022-04-05 ENCOUNTER — TELEPHONE (OUTPATIENT)
Dept: OBSTETRICS AND GYNECOLOGY | Facility: CLINIC | Age: 32
End: 2022-04-05
Payer: COMMERCIAL

## 2022-04-07 ENCOUNTER — OFFICE VISIT (OUTPATIENT)
Dept: PEDIATRIC CARDIOLOGY | Facility: CLINIC | Age: 32
End: 2022-04-07
Payer: COMMERCIAL

## 2022-04-07 ENCOUNTER — CLINICAL SUPPORT (OUTPATIENT)
Dept: PEDIATRIC CARDIOLOGY | Facility: CLINIC | Age: 32
End: 2022-04-07
Payer: COMMERCIAL

## 2022-04-07 VITALS
WEIGHT: 230.81 LBS | BODY MASS INDEX: 37.09 KG/M2 | HEIGHT: 66 IN | DIASTOLIC BLOOD PRESSURE: 65 MMHG | SYSTOLIC BLOOD PRESSURE: 116 MMHG | HEART RATE: 75 BPM

## 2022-04-07 DIAGNOSIS — Z03.73 SUSPECTED FETAL ANOMALY NOT FOUND: ICD-10-CM

## 2022-04-07 DIAGNOSIS — Z03.73 ENCOUNTER FOR SUSPECTED FETAL ANOMALY, RULED OUT: ICD-10-CM

## 2022-04-07 PROCEDURE — 99999 PR PBB SHADOW E&M-EST. PATIENT-LVL III: CPT | Mod: PBBFAC,,, | Performed by: PEDIATRICS

## 2022-04-07 PROCEDURE — 99203 OFFICE O/P NEW LOW 30 MIN: CPT | Mod: 25,S$GLB,, | Performed by: PEDIATRICS

## 2022-04-07 PROCEDURE — 76825 ECHO EXAM OF FETAL HEART: CPT | Mod: S$GLB,,, | Performed by: PEDIATRICS

## 2022-04-07 PROCEDURE — 3078F DIAST BP <80 MM HG: CPT | Mod: CPTII,S$GLB,, | Performed by: PEDIATRICS

## 2022-04-07 PROCEDURE — 76827 ECHO EXAM OF FETAL HEART: CPT | Mod: S$GLB,,, | Performed by: PEDIATRICS

## 2022-04-07 PROCEDURE — 99999 PR PBB SHADOW E&M-EST. PATIENT-LVL I: ICD-10-PCS | Mod: PBBFAC,,,

## 2022-04-07 PROCEDURE — 76825 PR  SO2 FETAL HEART: ICD-10-PCS | Mod: S$GLB,,, | Performed by: PEDIATRICS

## 2022-04-07 PROCEDURE — 3008F BODY MASS INDEX DOCD: CPT | Mod: CPTII,S$GLB,, | Performed by: PEDIATRICS

## 2022-04-07 PROCEDURE — 93325 DOPPLER ECHO COLOR FLOW MAPG: CPT | Mod: S$GLB,,, | Performed by: PEDIATRICS

## 2022-04-07 PROCEDURE — 3074F SYST BP LT 130 MM HG: CPT | Mod: CPTII,S$GLB,, | Performed by: PEDIATRICS

## 2022-04-07 PROCEDURE — 99203 PR OFFICE/OUTPT VISIT, NEW, LEVL III, 30-44 MIN: ICD-10-PCS | Mod: 25,S$GLB,, | Performed by: PEDIATRICS

## 2022-04-07 PROCEDURE — 1159F PR MEDICATION LIST DOCUMENTED IN MEDICAL RECORD: ICD-10-PCS | Mod: CPTII,S$GLB,, | Performed by: PEDIATRICS

## 2022-04-07 PROCEDURE — 3078F PR MOST RECENT DIASTOLIC BLOOD PRESSURE < 80 MM HG: ICD-10-PCS | Mod: CPTII,S$GLB,, | Performed by: PEDIATRICS

## 2022-04-07 PROCEDURE — 99999 PR PBB SHADOW E&M-EST. PATIENT-LVL III: ICD-10-PCS | Mod: PBBFAC,,, | Performed by: PEDIATRICS

## 2022-04-07 PROCEDURE — 76827 PR  SO2 FETAL HEART DOPPLER: ICD-10-PCS | Mod: S$GLB,,, | Performed by: PEDIATRICS

## 2022-04-07 PROCEDURE — 1159F MED LIST DOCD IN RCRD: CPT | Mod: CPTII,S$GLB,, | Performed by: PEDIATRICS

## 2022-04-07 PROCEDURE — 93325 PR DOPPLER COLOR FLOW VELOCITY MAP: ICD-10-PCS | Mod: S$GLB,,, | Performed by: PEDIATRICS

## 2022-04-07 PROCEDURE — 3074F PR MOST RECENT SYSTOLIC BLOOD PRESSURE < 130 MM HG: ICD-10-PCS | Mod: CPTII,S$GLB,, | Performed by: PEDIATRICS

## 2022-04-07 PROCEDURE — 3008F PR BODY MASS INDEX (BMI) DOCUMENTED: ICD-10-PCS | Mod: CPTII,S$GLB,, | Performed by: PEDIATRICS

## 2022-04-07 PROCEDURE — 99999 PR PBB SHADOW E&M-EST. PATIENT-LVL I: CPT | Mod: PBBFAC,,,

## 2022-04-07 NOTE — PROGRESS NOTES
[facility] ([place]) Fetal Cardiology Clinic    Today, I had the pleasure of evaluating Patsy Rosenthal who is now 31 y.o. and carrying her first pregnancy at 27 5/7 weeks gestation with an MARILYN of 22. She was referred for evaluation of the fetal heart due to possible ventricular septal defect detected in the anatomy scan. The remainder of the study demonstrated no abnormalities. Cell free DNA was negative.      She is carrying a male fetus, yet unnamed.  She has felt the baby move.       Obstetric History:    .  Her OB history is otherwise unremarkable.     Past Medical History:   Diagnosis Date    Depression          Current Outpatient Medications:     prenatal 25/iron fum/folic/dha (PRENATAL-1 ORAL), Take by mouth., Disp: , Rfl:      Review of patient's allergies indicates:  No Known Allergies    Family History: Negative for congenital heart disease, early coronary artery disease, sudden unexplained death, connective tissues disorders, genetic syndromes, multiple miscarriages or other congenital anomalies.    Social History: Ms. Patsy Rosenthal is  to the father of the baby.    FETAL ECHOCARDIOGRAM (summary):  Normal fetal echocardiogram.    (Full report in electronic medical record)    Impression:  Single active male fetus at 27 5/7 wga.  Normal fetal echocardiogram.      Todays fetal echocardiogram is normal, within the limitations of fetal echocardiography.  I discussed with her that fetal echocardiography is insufficiently sensitive to rule out all septal defects, anomalies of pulmonary and systemic veins, arch anomalies, and some valvar abnormalities, nor can it ensure that the ductus arteriosus and foramen ovale will spontaneously close.     Recommendations:  Location, timing, and mode of delivery will be determined by the obstetrical team.  She does not require further follow-up in the fetal echocardiography clinic, but I would be happy to see her again if additional  questions or concerns arise.    Should there be any concerns about the baby's heart after birth, a post- echocardiogram and cardiology consultation are recommended.           The above information was discussed in detail including the use of diagrams, 30 minutes were used for the evaluation with half of that time in discussion and counseling.

## 2022-04-08 ENCOUNTER — LAB VISIT (OUTPATIENT)
Dept: LAB | Facility: OTHER | Age: 32
End: 2022-04-08
Attending: OBSTETRICS & GYNECOLOGY
Payer: COMMERCIAL

## 2022-04-08 ENCOUNTER — TELEPHONE (OUTPATIENT)
Dept: OBSTETRICS AND GYNECOLOGY | Facility: CLINIC | Age: 32
End: 2022-04-08
Payer: COMMERCIAL

## 2022-04-08 DIAGNOSIS — O99.810 ABNORMAL GLUCOSE AFFECTING PREGNANCY: ICD-10-CM

## 2022-04-08 LAB
GLUCOSE SERPL-MCNC: 146 MG/DL
GLUCOSE SERPL-MCNC: 163 MG/DL
GLUCOSE SERPL-MCNC: 80 MG/DL (ref 70–110)
GLUCOSE SERPL-MCNC: 97 MG/DL

## 2022-04-08 PROCEDURE — 36415 COLL VENOUS BLD VENIPUNCTURE: CPT | Performed by: OBSTETRICS & GYNECOLOGY

## 2022-04-08 PROCEDURE — 82951 GLUCOSE TOLERANCE TEST (GTT): CPT | Performed by: OBSTETRICS & GYNECOLOGY

## 2022-04-08 NOTE — TELEPHONE ENCOUNTER
Called patient:    Discussed results of 3 hour GTT- normal         Gluc Fast 70 - 110 mg/dL 80    Gluc 1 HR mg/dL 163    Gluc 2 HR mg/dL 146    Gluc 3 HR mg/dL 97      To reduce carb intake.

## 2022-04-22 ENCOUNTER — CLINICAL SUPPORT (OUTPATIENT)
Dept: OBSTETRICS AND GYNECOLOGY | Facility: CLINIC | Age: 32
End: 2022-04-22
Attending: OBSTETRICS & GYNECOLOGY
Payer: COMMERCIAL

## 2022-04-22 VITALS
DIASTOLIC BLOOD PRESSURE: 60 MMHG | WEIGHT: 229.06 LBS | BODY MASS INDEX: 36.99 KG/M2 | SYSTOLIC BLOOD PRESSURE: 118 MMHG

## 2022-04-22 DIAGNOSIS — Z3A.29 PREGNANCY WITH 29 COMPLETED WEEKS GESTATION: ICD-10-CM

## 2022-04-22 DIAGNOSIS — Z34.03 ENCOUNTER FOR SUPERVISION OF NORMAL FIRST PREGNANCY IN THIRD TRIMESTER: Primary | ICD-10-CM

## 2022-04-22 DIAGNOSIS — Z23 NEED FOR TDAP VACCINATION: Primary | ICD-10-CM

## 2022-04-22 DIAGNOSIS — Z23 NEED FOR DIPHTHERIA-TETANUS-PERTUSSIS (TDAP) VACCINE: ICD-10-CM

## 2022-04-22 PROCEDURE — 0502F SUBSEQUENT PRENATAL CARE: CPT | Mod: CPTII,S$GLB,, | Performed by: OBSTETRICS & GYNECOLOGY

## 2022-04-22 PROCEDURE — 0502F PR SUBSEQUENT PRENATAL CARE: ICD-10-PCS | Mod: CPTII,S$GLB,, | Performed by: OBSTETRICS & GYNECOLOGY

## 2022-04-22 PROCEDURE — 99999 PR PBB SHADOW E&M-EST. PATIENT-LVL I: ICD-10-PCS | Mod: PBBFAC,,,

## 2022-04-22 PROCEDURE — 99999 PR PBB SHADOW E&M-EST. PATIENT-LVL I: CPT | Mod: PBBFAC,,,

## 2022-04-22 PROCEDURE — 90471 IMMUNIZATION ADMIN: CPT | Mod: S$GLB,,, | Performed by: OBSTETRICS & GYNECOLOGY

## 2022-04-22 PROCEDURE — 90471 TDAP VACCINE GREATER THAN OR EQUAL TO 7YO IM: ICD-10-PCS | Mod: S$GLB,,, | Performed by: OBSTETRICS & GYNECOLOGY

## 2022-04-22 PROCEDURE — 90715 TDAP VACCINE GREATER THAN OR EQUAL TO 7YO IM: ICD-10-PCS | Mod: S$GLB,,, | Performed by: OBSTETRICS & GYNECOLOGY

## 2022-04-22 PROCEDURE — 99999 PR PBB SHADOW E&M-EST. PATIENT-LVL III: CPT | Mod: PBBFAC,,, | Performed by: OBSTETRICS & GYNECOLOGY

## 2022-04-22 PROCEDURE — 99999 PR PBB SHADOW E&M-EST. PATIENT-LVL III: ICD-10-PCS | Mod: PBBFAC,,, | Performed by: OBSTETRICS & GYNECOLOGY

## 2022-04-22 PROCEDURE — 90715 TDAP VACCINE 7 YRS/> IM: CPT | Mod: S$GLB,,, | Performed by: OBSTETRICS & GYNECOLOGY

## 2022-04-22 NOTE — PROGRESS NOTES
Reports appropriate FM.  Denies bleeding and cramping.  Itching has resolved with Monistat.  Aware of normal 3 hour GTT - to reduce sugar and carb intake.  Discussed fetal echo with normal findings.  Discussed [facility].  Return 2 weeks.

## 2022-05-06 ENCOUNTER — ROUTINE PRENATAL (OUTPATIENT)
Dept: OBSTETRICS AND GYNECOLOGY | Facility: CLINIC | Age: 32
End: 2022-05-06
Attending: OBSTETRICS & GYNECOLOGY
Payer: COMMERCIAL

## 2022-05-06 VITALS
DIASTOLIC BLOOD PRESSURE: 68 MMHG | BODY MASS INDEX: 37.24 KG/M2 | WEIGHT: 230.63 LBS | SYSTOLIC BLOOD PRESSURE: 104 MMHG

## 2022-05-06 DIAGNOSIS — Z3A.31 PREGNANCY WITH 31 COMPLETED WEEKS GESTATION: ICD-10-CM

## 2022-05-06 DIAGNOSIS — Z34.03 ENCOUNTER FOR SUPERVISION OF NORMAL FIRST PREGNANCY IN THIRD TRIMESTER: Primary | ICD-10-CM

## 2022-05-06 PROCEDURE — 0502F PR SUBSEQUENT PRENATAL CARE: ICD-10-PCS | Mod: CPTII,S$GLB,, | Performed by: OBSTETRICS & GYNECOLOGY

## 2022-05-06 PROCEDURE — 0502F SUBSEQUENT PRENATAL CARE: CPT | Mod: CPTII,S$GLB,, | Performed by: OBSTETRICS & GYNECOLOGY

## 2022-05-06 PROCEDURE — 99999 PR PBB SHADOW E&M-EST. PATIENT-LVL III: CPT | Mod: PBBFAC,,, | Performed by: OBSTETRICS & GYNECOLOGY

## 2022-05-06 PROCEDURE — 99999 PR PBB SHADOW E&M-EST. PATIENT-LVL III: ICD-10-PCS | Mod: PBBFAC,,, | Performed by: OBSTETRICS & GYNECOLOGY

## 2022-05-20 ENCOUNTER — ROUTINE PRENATAL (OUTPATIENT)
Dept: OBSTETRICS AND GYNECOLOGY | Facility: CLINIC | Age: 32
End: 2022-05-20
Attending: OBSTETRICS & GYNECOLOGY
Payer: COMMERCIAL

## 2022-05-20 VITALS
DIASTOLIC BLOOD PRESSURE: 60 MMHG | SYSTOLIC BLOOD PRESSURE: 108 MMHG | BODY MASS INDEX: 38.31 KG/M2 | WEIGHT: 237.19 LBS

## 2022-05-20 DIAGNOSIS — Z34.03 ENCOUNTER FOR SUPERVISION OF NORMAL FIRST PREGNANCY IN THIRD TRIMESTER: Primary | ICD-10-CM

## 2022-05-20 DIAGNOSIS — Z3A.33 PREGNANCY WITH 33 COMPLETED WEEKS GESTATION: ICD-10-CM

## 2022-05-20 PROCEDURE — 0502F SUBSEQUENT PRENATAL CARE: CPT | Mod: CPTII,S$GLB,, | Performed by: OBSTETRICS & GYNECOLOGY

## 2022-05-20 PROCEDURE — 99999 PR PBB SHADOW E&M-EST. PATIENT-LVL III: CPT | Mod: PBBFAC,,, | Performed by: OBSTETRICS & GYNECOLOGY

## 2022-05-20 PROCEDURE — 0502F PR SUBSEQUENT PRENATAL CARE: ICD-10-PCS | Mod: CPTII,S$GLB,, | Performed by: OBSTETRICS & GYNECOLOGY

## 2022-05-20 PROCEDURE — 99999 PR PBB SHADOW E&M-EST. PATIENT-LVL III: ICD-10-PCS | Mod: PBBFAC,,, | Performed by: OBSTETRICS & GYNECOLOGY

## 2022-05-20 NOTE — PROGRESS NOTES
Good FM.  Denies bleeding and CTX.  Reports discomfort in right buttocks, into back of right leg, consisting with sciatic discomfort- discussed, feels better today.  BP and urine are normal.  Return 1 week.  [facility] bid.

## 2022-05-27 ENCOUNTER — ROUTINE PRENATAL (OUTPATIENT)
Dept: OBSTETRICS AND GYNECOLOGY | Facility: CLINIC | Age: 32
End: 2022-05-27
Attending: OBSTETRICS & GYNECOLOGY
Payer: COMMERCIAL

## 2022-05-27 VITALS
SYSTOLIC BLOOD PRESSURE: 114 MMHG | DIASTOLIC BLOOD PRESSURE: 70 MMHG | BODY MASS INDEX: 38.45 KG/M2 | WEIGHT: 238.13 LBS

## 2022-05-27 DIAGNOSIS — Z34.03 ENCOUNTER FOR SUPERVISION OF NORMAL FIRST PREGNANCY IN THIRD TRIMESTER: Primary | ICD-10-CM

## 2022-05-27 DIAGNOSIS — Z3A.34 PREGNANCY WITH 34 COMPLETED WEEKS GESTATION: ICD-10-CM

## 2022-05-27 PROCEDURE — 99999 PR PBB SHADOW E&M-EST. PATIENT-LVL II: CPT | Mod: PBBFAC,,, | Performed by: OBSTETRICS & GYNECOLOGY

## 2022-05-27 PROCEDURE — 0502F PR SUBSEQUENT PRENATAL CARE: ICD-10-PCS | Mod: CPTII,S$GLB,, | Performed by: OBSTETRICS & GYNECOLOGY

## 2022-05-27 PROCEDURE — 0502F SUBSEQUENT PRENATAL CARE: CPT | Mod: CPTII,S$GLB,, | Performed by: OBSTETRICS & GYNECOLOGY

## 2022-05-27 PROCEDURE — 99999 PR PBB SHADOW E&M-EST. PATIENT-LVL II: ICD-10-PCS | Mod: PBBFAC,,, | Performed by: OBSTETRICS & GYNECOLOGY

## 2022-05-27 NOTE — PROGRESS NOTES
Reports good FM.  Denies bleeding and cramping.  Reports discomfort in right buttocks radiating into back of upper leg, consistent with sciatic nerve - discussed.  Reports less pedal edema.  BP and urine are normal.  Return 1 week.  [facility] bid.

## 2022-06-03 ENCOUNTER — ROUTINE PRENATAL (OUTPATIENT)
Dept: OBSTETRICS AND GYNECOLOGY | Facility: CLINIC | Age: 32
End: 2022-06-03
Attending: OBSTETRICS & GYNECOLOGY
Payer: COMMERCIAL

## 2022-06-03 VITALS — SYSTOLIC BLOOD PRESSURE: 116 MMHG | BODY MASS INDEX: 38.8 KG/M2 | WEIGHT: 240.31 LBS | DIASTOLIC BLOOD PRESSURE: 64 MMHG

## 2022-06-03 DIAGNOSIS — Z34.03 ENCOUNTER FOR SUPERVISION OF NORMAL FIRST PREGNANCY IN THIRD TRIMESTER: Primary | ICD-10-CM

## 2022-06-03 DIAGNOSIS — Z3A.35 PREGNANCY WITH 35 COMPLETED WEEKS GESTATION: ICD-10-CM

## 2022-06-03 PROCEDURE — 0502F SUBSEQUENT PRENATAL CARE: CPT | Mod: CPTII,S$GLB,, | Performed by: OBSTETRICS & GYNECOLOGY

## 2022-06-03 PROCEDURE — 99999 PR PBB SHADOW E&M-EST. PATIENT-LVL II: CPT | Mod: PBBFAC,,, | Performed by: OBSTETRICS & GYNECOLOGY

## 2022-06-03 PROCEDURE — 99999 PR PBB SHADOW E&M-EST. PATIENT-LVL II: ICD-10-PCS | Mod: PBBFAC,,, | Performed by: OBSTETRICS & GYNECOLOGY

## 2022-06-03 PROCEDURE — 0502F PR SUBSEQUENT PRENATAL CARE: ICD-10-PCS | Mod: CPTII,S$GLB,, | Performed by: OBSTETRICS & GYNECOLOGY

## 2022-06-03 NOTE — PROGRESS NOTES
Good FM.  Denies bleeding.  Feels occasional CTX.  Denies bleeding.  Cx: FT / soft / posterior.  BP and urine are normal.  Sciatic discomfort has improved.  Return 1 week.  [facility] bid.

## 2022-06-06 ENCOUNTER — PATIENT MESSAGE (OUTPATIENT)
Dept: OBSTETRICS AND GYNECOLOGY | Facility: CLINIC | Age: 32
End: 2022-06-06
Payer: COMMERCIAL

## 2022-06-09 ENCOUNTER — TELEPHONE (OUTPATIENT)
Dept: OBSTETRICS AND GYNECOLOGY | Facility: CLINIC | Age: 32
End: 2022-06-09
Payer: COMMERCIAL

## 2022-06-09 ENCOUNTER — ROUTINE PRENATAL (OUTPATIENT)
Dept: OBSTETRICS AND GYNECOLOGY | Facility: CLINIC | Age: 32
End: 2022-06-09
Attending: OBSTETRICS & GYNECOLOGY
Payer: COMMERCIAL

## 2022-06-09 VITALS
SYSTOLIC BLOOD PRESSURE: 120 MMHG | DIASTOLIC BLOOD PRESSURE: 72 MMHG | BODY MASS INDEX: 39.23 KG/M2 | WEIGHT: 242.94 LBS

## 2022-06-09 DIAGNOSIS — Z34.03 ENCOUNTER FOR SUPERVISION OF NORMAL FIRST PREGNANCY IN THIRD TRIMESTER: Primary | ICD-10-CM

## 2022-06-09 DIAGNOSIS — Z3A.36 PREGNANCY WITH 36 COMPLETED WEEKS GESTATION: ICD-10-CM

## 2022-06-09 PROCEDURE — 0502F SUBSEQUENT PRENATAL CARE: CPT | Mod: CPTII,S$GLB,, | Performed by: OBSTETRICS & GYNECOLOGY

## 2022-06-09 PROCEDURE — 99999 PR PBB SHADOW E&M-EST. PATIENT-LVL III: CPT | Mod: PBBFAC,,, | Performed by: OBSTETRICS & GYNECOLOGY

## 2022-06-09 PROCEDURE — 87081 CULTURE SCREEN ONLY: CPT | Performed by: OBSTETRICS & GYNECOLOGY

## 2022-06-09 PROCEDURE — 99999 PR PBB SHADOW E&M-EST. PATIENT-LVL III: ICD-10-PCS | Mod: PBBFAC,,, | Performed by: OBSTETRICS & GYNECOLOGY

## 2022-06-09 PROCEDURE — 0502F PR SUBSEQUENT PRENATAL CARE: ICD-10-PCS | Mod: CPTII,S$GLB,, | Performed by: OBSTETRICS & GYNECOLOGY

## 2022-06-09 NOTE — PROGRESS NOTES
Good FM.  Denies bleeding and cramping. GBBS performed.  Cx: unchanged FT / soft / posterior.  Reports fatigue- discussed.  She has reviewed and signed delivery consents at home- consents again discussed and reviewed.  Labor precautions reviewed.  T3 labs.  Return 1 week.  [facility] bid.

## 2022-06-09 NOTE — TELEPHONE ENCOUNTER
----- Message from Kimberleestacey Briggs sent at 6/9/2022 12:26 PM CDT -----  Regarding: Requesting a [facility] appt  Contact: [facility] [[id number]]  Name of Who is Calling:[facility] [[id number]]          What is the request in detail: Pt states she is requesting a call back in regards to coming in earlier today, she wants to know if that is possible. Please advise           Can the clinic reply by [facility]:No           [facility]:[phone]

## 2022-06-11 LAB — BACTERIA SPEC AEROBE CULT: NORMAL

## 2022-06-12 ENCOUNTER — PATIENT MESSAGE (OUTPATIENT)
Dept: OBSTETRICS AND GYNECOLOGY | Facility: CLINIC | Age: 32
End: 2022-06-12
Payer: COMMERCIAL

## 2022-06-15 NOTE — PROGRESS NOTES
Met virtually for prenatal consultation.  Discussed expectations.  Answered any questions about nursery and primary care of infant.  Provided reassurance and anticipatory guidance.  Encouraged parent(s) to reach out with any questions.

## 2022-06-16 ENCOUNTER — ROUTINE PRENATAL (OUTPATIENT)
Dept: OBSTETRICS AND GYNECOLOGY | Facility: CLINIC | Age: 32
End: 2022-06-16
Attending: OBSTETRICS & GYNECOLOGY
Payer: COMMERCIAL

## 2022-06-16 VITALS
BODY MASS INDEX: 39.73 KG/M2 | WEIGHT: 246.06 LBS | SYSTOLIC BLOOD PRESSURE: 110 MMHG | DIASTOLIC BLOOD PRESSURE: 70 MMHG

## 2022-06-16 DIAGNOSIS — Z34.03 ENCOUNTER FOR SUPERVISION OF NORMAL FIRST PREGNANCY IN THIRD TRIMESTER: Primary | ICD-10-CM

## 2022-06-16 PROCEDURE — 0502F SUBSEQUENT PRENATAL CARE: CPT | Mod: CPTII,S$GLB,, | Performed by: OBSTETRICS & GYNECOLOGY

## 2022-06-16 PROCEDURE — 99999 PR PBB SHADOW E&M-EST. PATIENT-LVL III: ICD-10-PCS | Mod: PBBFAC,,, | Performed by: OBSTETRICS & GYNECOLOGY

## 2022-06-16 PROCEDURE — 0502F PR SUBSEQUENT PRENATAL CARE: ICD-10-PCS | Mod: CPTII,S$GLB,, | Performed by: OBSTETRICS & GYNECOLOGY

## 2022-06-16 PROCEDURE — 99999 PR PBB SHADOW E&M-EST. PATIENT-LVL III: CPT | Mod: PBBFAC,,, | Performed by: OBSTETRICS & GYNECOLOGY

## 2022-06-17 ENCOUNTER — OFFICE VISIT (OUTPATIENT)
Dept: PEDIATRICS | Facility: CLINIC | Age: 32
End: 2022-06-17
Payer: COMMERCIAL

## 2022-06-17 ENCOUNTER — PATIENT MESSAGE (OUTPATIENT)
Dept: PEDIATRICS | Facility: CLINIC | Age: 32
End: 2022-06-17

## 2022-06-17 DIAGNOSIS — Z76.81 EXPECTANT PARENT PREBIRTH PEDIATRICIAN VISIT: Primary | ICD-10-CM

## 2022-06-17 PROCEDURE — 99999 PR PBB SHADOW E&M-EST. PATIENT-LVL I: ICD-10-PCS | Mod: PBBFAC,,, | Performed by: PEDIATRICS

## 2022-06-17 PROCEDURE — 99499 NO LOS: ICD-10-PCS | Mod: S$GLB,,, | Performed by: PEDIATRICS

## 2022-06-17 PROCEDURE — 99499 UNLISTED E&M SERVICE: CPT | Mod: S$GLB,,, | Performed by: PEDIATRICS

## 2022-06-17 PROCEDURE — 99999 PR PBB SHADOW E&M-EST. PATIENT-LVL I: CPT | Mod: PBBFAC,,, | Performed by: PEDIATRICS

## 2022-06-20 ENCOUNTER — OFFICE VISIT (OUTPATIENT)
Dept: PEDIATRICS | Facility: CLINIC | Age: 32
End: 2022-06-20
Payer: COMMERCIAL

## 2022-06-20 DIAGNOSIS — Z71.89 PRENATAL CONSULT: Primary | ICD-10-CM

## 2022-06-20 PROCEDURE — 99499 UNLISTED E&M SERVICE: CPT | Mod: S$GLB,,, | Performed by: STUDENT IN AN ORGANIZED HEALTH CARE EDUCATION/TRAINING PROGRAM

## 2022-06-20 PROCEDURE — 1159F PR MEDICATION LIST DOCUMENTED IN MEDICAL RECORD: ICD-10-PCS | Mod: CPTII,S$GLB,, | Performed by: STUDENT IN AN ORGANIZED HEALTH CARE EDUCATION/TRAINING PROGRAM

## 2022-06-20 PROCEDURE — 1159F MED LIST DOCD IN RCRD: CPT | Mod: CPTII,S$GLB,, | Performed by: STUDENT IN AN ORGANIZED HEALTH CARE EDUCATION/TRAINING PROGRAM

## 2022-06-20 PROCEDURE — 99999 PR PBB SHADOW E&M-EST. PATIENT-LVL II: CPT | Mod: PBBFAC,,, | Performed by: STUDENT IN AN ORGANIZED HEALTH CARE EDUCATION/TRAINING PROGRAM

## 2022-06-20 PROCEDURE — 99499 NO LOS: ICD-10-PCS | Mod: S$GLB,,, | Performed by: STUDENT IN AN ORGANIZED HEALTH CARE EDUCATION/TRAINING PROGRAM

## 2022-06-20 PROCEDURE — 99999 PR PBB SHADOW E&M-EST. PATIENT-LVL II: ICD-10-PCS | Mod: PBBFAC,,, | Performed by: STUDENT IN AN ORGANIZED HEALTH CARE EDUCATION/TRAINING PROGRAM

## 2022-06-20 PROCEDURE — 1160F PR REVIEW ALL MEDS BY PRESCRIBER/CLIN PHARMACIST DOCUMENTED: ICD-10-PCS | Mod: CPTII,S$GLB,, | Performed by: STUDENT IN AN ORGANIZED HEALTH CARE EDUCATION/TRAINING PROGRAM

## 2022-06-20 PROCEDURE — 1160F RVW MEDS BY RX/DR IN RCRD: CPT | Mod: CPTII,S$GLB,, | Performed by: STUDENT IN AN ORGANIZED HEALTH CARE EDUCATION/TRAINING PROGRAM

## 2022-06-21 NOTE — PROGRESS NOTES
CC: Prenatal visit    HPI: 30 yo first time mother here for prenatal visit. Would like to breastfeed if possible. Pro-vaccine. No other questions    Labor plans: Plans to deliver at [facility]. Due date in 2 weeks.         Assessment:Prenatal visit      Plan: practice care reviewed.    Anticipatory Guidance:  Nutrition, parenting, sleep, injury prevention, behavior and development.    Patient counseled 15 minutes.  RTC as instructed by nursery.      Karlo Spear MD

## 2022-06-23 ENCOUNTER — HOSPITAL ENCOUNTER (OUTPATIENT)
Dept: PERINATAL CARE | Facility: OTHER | Age: 32
Discharge: HOME | End: 2022-06-23
Attending: OBSTETRICS & GYNECOLOGY
Payer: COMMERCIAL

## 2022-06-23 ENCOUNTER — ROUTINE PRENATAL (OUTPATIENT)
Dept: OBSTETRICS AND GYNECOLOGY | Facility: CLINIC | Age: 32
End: 2022-06-23
Attending: OBSTETRICS & GYNECOLOGY
Payer: COMMERCIAL

## 2022-06-23 VITALS
WEIGHT: 244.94 LBS | DIASTOLIC BLOOD PRESSURE: 70 MMHG | SYSTOLIC BLOOD PRESSURE: 110 MMHG | BODY MASS INDEX: 39.55 KG/M2

## 2022-06-23 DIAGNOSIS — Z3A.38 PREGNANCY WITH 38 COMPLETED WEEKS GESTATION: ICD-10-CM

## 2022-06-23 DIAGNOSIS — Z03.73 SUSPECTED FETAL ANOMALY NOT FOUND: Primary | ICD-10-CM

## 2022-06-23 DIAGNOSIS — O36.8130 DECREASED FETAL MOVEMENTS IN THIRD TRIMESTER, SINGLE OR UNSPECIFIED FETUS: ICD-10-CM

## 2022-06-23 DIAGNOSIS — Z34.03 ENCOUNTER FOR SUPERVISION OF NORMAL FIRST PREGNANCY IN THIRD TRIMESTER: Primary | ICD-10-CM

## 2022-06-23 DIAGNOSIS — O48.0 POST-TERM PREGNANCY, 40-42 WEEKS OF GESTATION: ICD-10-CM

## 2022-06-23 PROCEDURE — 0502F SUBSEQUENT PRENATAL CARE: CPT | Mod: CPTII,S$GLB,, | Performed by: OBSTETRICS & GYNECOLOGY

## 2022-06-23 PROCEDURE — 59025 PRENATAL TESTING - NST/AFI: ICD-10-PCS | Mod: 26,,, | Performed by: OBSTETRICS & GYNECOLOGY

## 2022-06-23 PROCEDURE — 99999 PR PBB SHADOW E&M-EST. PATIENT-LVL II: CPT | Mod: PBBFAC,,, | Performed by: OBSTETRICS & GYNECOLOGY

## 2022-06-23 PROCEDURE — 99999 PR PBB SHADOW E&M-EST. PATIENT-LVL II: ICD-10-PCS | Mod: PBBFAC,,, | Performed by: OBSTETRICS & GYNECOLOGY

## 2022-06-23 PROCEDURE — 76815 OB US LIMITED FETUS(S): CPT | Mod: 26,,, | Performed by: OBSTETRICS & GYNECOLOGY

## 2022-06-23 PROCEDURE — 76815 PRENATAL TESTING - NST/AFI: ICD-10-PCS | Mod: 26,,, | Performed by: OBSTETRICS & GYNECOLOGY

## 2022-06-23 PROCEDURE — 76818 FETAL BIOPHYS PROFILE W/NST: CPT

## 2022-06-23 PROCEDURE — 59025 FETAL NON-STRESS TEST: CPT | Mod: 26,,, | Performed by: OBSTETRICS & GYNECOLOGY

## 2022-06-23 PROCEDURE — 0502F PR SUBSEQUENT PRENATAL CARE: ICD-10-PCS | Mod: CPTII,S$GLB,, | Performed by: OBSTETRICS & GYNECOLOGY

## 2022-06-23 NOTE — PROGRESS NOTES
Reports +FM, but somewhat decreased today.  No bleeding.  Denies CTX.  Cx unchanged- FT / soft / posterior.  Post-dates talk- she would like induction in the 40th week - request submitted.  Labor precautions reviewed.  Return 1 week.  [facility] bid.  PNT now for fetal monitoring.

## 2022-06-28 ENCOUNTER — ANESTHESIA (OUTPATIENT)
Dept: OBSTETRICS AND GYNECOLOGY | Facility: OTHER | Age: 32
End: 2022-06-28
Payer: COMMERCIAL

## 2022-06-28 ENCOUNTER — HOSPITAL ENCOUNTER (INPATIENT)
Facility: OTHER | Age: 32
LOS: 3 days | Discharge: HOME | End: 2022-07-01
Attending: OBSTETRICS & GYNECOLOGY | Admitting: OBSTETRICS & GYNECOLOGY
Payer: COMMERCIAL

## 2022-06-28 ENCOUNTER — ANESTHESIA EVENT (OUTPATIENT)
Dept: OBSTETRICS AND GYNECOLOGY | Facility: OTHER | Age: 32
End: 2022-06-28
Payer: COMMERCIAL

## 2022-06-28 DIAGNOSIS — Z3A.39 39 WEEKS GESTATION OF PREGNANCY: ICD-10-CM

## 2022-06-28 DIAGNOSIS — Z37.9 NORMAL LABOR: Primary | ICD-10-CM

## 2022-06-28 PROBLEM — O99.213 OBESITY AFFECTING PREGNANCY IN THIRD TRIMESTER: Status: ACTIVE | Noted: 2022-06-28

## 2022-06-28 PROBLEM — Q51.9 UTERINE ANOMALY: Status: ACTIVE | Noted: 2022-06-28

## 2022-06-28 LAB
ABO + RH BLD: NORMAL
BASOPHILS # BLD AUTO: 0.03 K/UL (ref 0–0.2)
BASOPHILS NFR BLD: 0.3 % (ref 0–1.9)
BILIRUB SERPL-MCNC: NORMAL MG/DL
BLD GP AB SCN CELLS X3 SERPL QL: NORMAL
BLOOD URINE, POC: NORMAL
COLOR, POC UA: YELLOW
DIFFERENTIAL METHOD: ABNORMAL
EOSINOPHIL # BLD AUTO: 0 K/UL (ref 0–0.5)
EOSINOPHIL NFR BLD: 0.4 % (ref 0–8)
ERYTHROCYTE [DISTWIDTH] IN BLOOD BY AUTOMATED COUNT: 13.2 % (ref 11.5–14.5)
GLUCOSE UR QL STRIP: NORMAL
HCT VFR BLD AUTO: 40.1 % (ref 37–48.5)
HGB BLD-MCNC: 13.6 G/DL (ref 12–16)
HIV 1+2 AB+HIV1 P24 AG SERPL QL IA: NEGATIVE
IMM GRANULOCYTES # BLD AUTO: 0.06 K/UL (ref 0–0.04)
IMM GRANULOCYTES NFR BLD AUTO: 0.6 % (ref 0–0.5)
KETONES UR QL STRIP: NORMAL
LEUKOCYTE ESTERASE URINE, POC: NORMAL
LYMPHOCYTES # BLD AUTO: 1.7 K/UL (ref 1–4.8)
LYMPHOCYTES NFR BLD: 15.6 % (ref 18–48)
MCH RBC QN AUTO: 31.4 PG (ref 27–31)
MCHC RBC AUTO-ENTMCNC: 33.9 G/DL (ref 32–36)
MCV RBC AUTO: 93 FL (ref 82–98)
MONOCYTES # BLD AUTO: 0.8 K/UL (ref 0.3–1)
MONOCYTES NFR BLD: 7.3 % (ref 4–15)
NEUTROPHILS # BLD AUTO: 8.1 K/UL (ref 1.8–7.7)
NEUTROPHILS NFR BLD: 75.8 % (ref 38–73)
NITRITE, POC UA: NORMAL
NRBC BLD-RTO: 0 /100 WBC
PH, POC UA: 6
PLATELET # BLD AUTO: 282 K/UL (ref 150–450)
PLATELET BLD QL SMEAR: ABNORMAL
PMV BLD AUTO: 9.6 FL (ref 9.2–12.9)
PROTEIN, POC: NORMAL
RBC # BLD AUTO: 4.33 M/UL (ref 4–5.4)
SPECIFIC GRAVITY, POC UA: 1.01
UROBILINOGEN, POC UA: NORMAL
WBC # BLD AUTO: 10.65 K/UL (ref 3.9–12.7)

## 2022-06-28 PROCEDURE — 25000003 PHARM REV CODE 250: Performed by: STUDENT IN AN ORGANIZED HEALTH CARE EDUCATION/TRAINING PROGRAM

## 2022-06-28 PROCEDURE — 86592 SYPHILIS TEST NON-TREP QUAL: CPT | Performed by: STUDENT IN AN ORGANIZED HEALTH CARE EDUCATION/TRAINING PROGRAM

## 2022-06-28 PROCEDURE — 86901 BLOOD TYPING SEROLOGIC RH(D): CPT | Performed by: STUDENT IN AN ORGANIZED HEALTH CARE EDUCATION/TRAINING PROGRAM

## 2022-06-28 PROCEDURE — 86900 BLOOD TYPING SEROLOGIC ABO: CPT | Performed by: STUDENT IN AN ORGANIZED HEALTH CARE EDUCATION/TRAINING PROGRAM

## 2022-06-28 PROCEDURE — 87389 HIV-1 AG W/HIV-1&-2 AB AG IA: CPT | Performed by: STUDENT IN AN ORGANIZED HEALTH CARE EDUCATION/TRAINING PROGRAM

## 2022-06-28 PROCEDURE — 27200710 HC EPIDURAL INFUSION PUMP SET: Performed by: ANESTHESIOLOGY

## 2022-06-28 PROCEDURE — 62326 NJX INTERLAMINAR LMBR/SAC: CPT | Performed by: ANESTHESIOLOGY

## 2022-06-28 PROCEDURE — 51702 INSERT TEMP BLADDER CATH: CPT

## 2022-06-28 PROCEDURE — 85025 COMPLETE CBC W/AUTO DIFF WBC: CPT | Performed by: STUDENT IN AN ORGANIZED HEALTH CARE EDUCATION/TRAINING PROGRAM

## 2022-06-28 PROCEDURE — 63600175 PHARM REV CODE 636 W HCPCS: Performed by: STUDENT IN AN ORGANIZED HEALTH CARE EDUCATION/TRAINING PROGRAM

## 2022-06-28 PROCEDURE — 59400 PRA FULL ROUT OBSTE CARE,VAGINAL DELIV: ICD-10-PCS | Mod: QY,,, | Performed by: ANESTHESIOLOGY

## 2022-06-28 PROCEDURE — 25000003 PHARM REV CODE 250: Performed by: ANESTHESIOLOGY

## 2022-06-28 PROCEDURE — 99284 PR EMERGENCY DEPT VISIT,LEVEL IV: ICD-10-PCS | Mod: 25,,, | Performed by: OBSTETRICS & GYNECOLOGY

## 2022-06-28 PROCEDURE — 59025 FETAL NON-STRESS TEST: CPT | Mod: 26,,, | Performed by: OBSTETRICS & GYNECOLOGY

## 2022-06-28 PROCEDURE — 59025 FETAL NON-STRESS TEST: CPT

## 2022-06-28 PROCEDURE — 59025 PR FETAL 2N-STRESS TEST: ICD-10-PCS | Mod: 26,,, | Performed by: OBSTETRICS & GYNECOLOGY

## 2022-06-28 PROCEDURE — 27000181 HC CABLE, IUPC

## 2022-06-28 PROCEDURE — 99285 EMERGENCY DEPT VISIT HI MDM: CPT | Mod: 25

## 2022-06-28 PROCEDURE — 81002 URINALYSIS NONAUTO W/O SCOPE: CPT

## 2022-06-28 PROCEDURE — 99284 EMERGENCY DEPT VISIT MOD MDM: CPT | Mod: 25,,, | Performed by: OBSTETRICS & GYNECOLOGY

## 2022-06-28 PROCEDURE — 11000001 HC ACUTE MED/SURG PRIVATE ROOM

## 2022-06-28 PROCEDURE — 63600175 PHARM REV CODE 636 W HCPCS

## 2022-06-28 PROCEDURE — C1751 CATH, INF, PER/CENT/MIDLINE: HCPCS | Performed by: ANESTHESIOLOGY

## 2022-06-28 PROCEDURE — 59400 OBSTETRICAL CARE: CPT | Mod: QY,,, | Performed by: ANESTHESIOLOGY

## 2022-06-28 RX ORDER — CEFAZOLIN SODIUM 2 G/50ML
2 SOLUTION INTRAVENOUS ONCE AS NEEDED
Status: DISCONTINUED | OUTPATIENT
Start: 2022-06-28 | End: 2022-07-01 | Stop reason: HOSPADM

## 2022-06-28 RX ORDER — ONDANSETRON 8 MG/1
8 TABLET, ORALLY DISINTEGRATING ORAL EVERY 8 HOURS PRN
Status: DISCONTINUED | OUTPATIENT
Start: 2022-06-28 | End: 2022-07-01 | Stop reason: HOSPADM

## 2022-06-28 RX ORDER — FENTANYL CITRATE 50 UG/ML
INJECTION, SOLUTION INTRAMUSCULAR; INTRAVENOUS
Status: DISCONTINUED | OUTPATIENT
Start: 2022-06-28 | End: 2022-06-29

## 2022-06-28 RX ORDER — SODIUM CHLORIDE 9 MG/ML
INJECTION, SOLUTION INTRAVENOUS
Status: DISCONTINUED | OUTPATIENT
Start: 2022-06-28 | End: 2022-07-01 | Stop reason: HOSPADM

## 2022-06-28 RX ORDER — SIMETHICONE 80 MG
1 TABLET,CHEWABLE ORAL 4 TIMES DAILY PRN
Status: DISCONTINUED | OUTPATIENT
Start: 2022-06-28 | End: 2022-06-29 | Stop reason: SDUPTHER

## 2022-06-28 RX ORDER — TRANEXAMIC ACID 100 MG/ML
1000 INJECTION, SOLUTION INTRAVENOUS ONCE AS NEEDED
Status: DISCONTINUED | OUTPATIENT
Start: 2022-06-28 | End: 2022-07-01 | Stop reason: HOSPADM

## 2022-06-28 RX ORDER — OXYTOCIN/RINGER'S LACTATE 30/500 ML
0-30 PLASTIC BAG, INJECTION (ML) INTRAVENOUS CONTINUOUS
Status: DISCONTINUED | OUTPATIENT
Start: 2022-06-28 | End: 2022-07-01 | Stop reason: HOSPADM

## 2022-06-28 RX ORDER — PROCHLORPERAZINE EDISYLATE 5 MG/ML
5 INJECTION INTRAMUSCULAR; INTRAVENOUS EVERY 6 HOURS PRN
Status: DISCONTINUED | OUTPATIENT
Start: 2022-06-28 | End: 2022-07-01 | Stop reason: HOSPADM

## 2022-06-28 RX ORDER — FENTANYL/BUPIVACAINE/NS/PF 2MCG/ML-.1
PLASTIC BAG, INJECTION (ML) INJECTION
Status: COMPLETED
Start: 2022-06-28 | End: 2022-06-28

## 2022-06-28 RX ORDER — LIDOCAINE HYDROCHLORIDE AND EPINEPHRINE 15; 5 MG/ML; UG/ML
INJECTION, SOLUTION EPIDURAL
Status: DISCONTINUED | OUTPATIENT
Start: 2022-06-28 | End: 2022-06-29

## 2022-06-28 RX ORDER — OXYTOCIN/RINGER'S LACTATE 30/500 ML
95 PLASTIC BAG, INJECTION (ML) INTRAVENOUS ONCE
Status: COMPLETED | OUTPATIENT
Start: 2022-06-28 | End: 2022-06-29

## 2022-06-28 RX ORDER — FENTANYL/BUPIVACAINE/NS/PF 2MCG/ML-.1
PLASTIC BAG, INJECTION (ML) INJECTION CONTINUOUS
Status: DISCONTINUED | OUTPATIENT
Start: 2022-06-28 | End: 2022-07-01 | Stop reason: HOSPADM

## 2022-06-28 RX ORDER — LIDOCAINE HYDROCHLORIDE 10 MG/ML
10 INJECTION INFILTRATION; PERINEURAL ONCE AS NEEDED
Status: DISCONTINUED | OUTPATIENT
Start: 2022-06-28 | End: 2022-07-01 | Stop reason: HOSPADM

## 2022-06-28 RX ORDER — OXYTOCIN/RINGER'S LACTATE 30/500 ML
334 PLASTIC BAG, INJECTION (ML) INTRAVENOUS ONCE
Status: COMPLETED | OUTPATIENT
Start: 2022-06-28 | End: 2022-06-29

## 2022-06-28 RX ORDER — SODIUM CITRATE AND CITRIC ACID MONOHYDRATE 334; 500 MG/5ML; MG/5ML
30 SOLUTION ORAL ONCE
Status: DISCONTINUED | OUTPATIENT
Start: 2022-06-28 | End: 2022-07-01 | Stop reason: HOSPADM

## 2022-06-28 RX ORDER — CALCIUM CARBONATE 200(500)MG
500 TABLET,CHEWABLE ORAL 3 TIMES DAILY PRN
Status: DISCONTINUED | OUTPATIENT
Start: 2022-06-28 | End: 2022-07-01 | Stop reason: HOSPADM

## 2022-06-28 RX ORDER — BUPIVACAINE HYDROCHLORIDE 2.5 MG/ML
INJECTION, SOLUTION EPIDURAL; INFILTRATION; INTRACAUDAL
Status: DISPENSED
Start: 2022-06-28 | End: 2022-06-29

## 2022-06-28 RX ORDER — SODIUM CHLORIDE, SODIUM LACTATE, POTASSIUM CHLORIDE, CALCIUM CHLORIDE 600; 310; 30; 20 MG/100ML; MG/100ML; MG/100ML; MG/100ML
INJECTION, SOLUTION INTRAVENOUS CONTINUOUS
Status: DISCONTINUED | OUTPATIENT
Start: 2022-06-28 | End: 2022-07-01 | Stop reason: HOSPADM

## 2022-06-28 RX ORDER — FENTANYL CITRATE 50 UG/ML
INJECTION, SOLUTION INTRAMUSCULAR; INTRAVENOUS
Status: COMPLETED
Start: 2022-06-28 | End: 2022-06-28

## 2022-06-28 RX ADMIN — FENTANYL CITRATE 100 MCG: 50 INJECTION, SOLUTION INTRAMUSCULAR; INTRAVENOUS at 05:06

## 2022-06-28 RX ADMIN — CALCIUM CARBONATE (ANTACID) CHEW TAB 500 MG 500 MG: 500 CHEW TAB at 07:06

## 2022-06-28 RX ADMIN — Medication 2 MILLI-UNITS/MIN: at 10:06

## 2022-06-28 RX ADMIN — LIDOCAINE HYDROCHLORIDE,EPINEPHRINE BITARTRATE 3 ML: 15; .005 INJECTION, SOLUTION EPIDURAL; INFILTRATION; INTRACAUDAL; PERINEURAL at 05:06

## 2022-06-28 RX ADMIN — SODIUM CHLORIDE: 0.9 INJECTION, SOLUTION INTRAVENOUS at 10:06

## 2022-06-28 RX ADMIN — Medication 10 ML/HR: at 05:06

## 2022-06-28 RX ADMIN — SODIUM CHLORIDE, SODIUM LACTATE, POTASSIUM CHLORIDE, AND CALCIUM CHLORIDE: .6; .31; .03; .02 INJECTION, SOLUTION INTRAVENOUS at 07:06

## 2022-06-28 RX ADMIN — BUPIVACAINE HYDROCHLORIDE 10 ML: 2.5 INJECTION, SOLUTION EPIDURAL; INFILTRATION; INTRACAUDAL; PERINEURAL at 05:06

## 2022-06-28 NOTE — PROGRESS NOTES
LABOR NOTE    S:  Complaints: No.  Epidural working:  yes  MD to bedside for FHT deceleration    O: /61   Pulse 88   Temp 96.6 °F (35.9 °C) (Temporal)   Resp 16   LMP 09/14/2021 (Exact Date)   SpO2 98%   Breastfeeding No     FHT: baseline 150, moderate BTBV, + accels, + late decels Cat 2   CTX: q 2-5 minutes  SVE: 6/80/-1, AROM clear    Patient's blood pressure noted to be low after epidural. Anesthesia assessed and provided medication to increase BP. Patient given IVF bolus and repositioned. Decels improved with scalp stim and AROM.     PLAN:  Continue Close Maternal/Fetal Monitoring  Recheck 2 hours or PRN    Claudia Hills MD  PGY-2 OB/GYN

## 2022-06-28 NOTE — ANESTHESIA PROCEDURE NOTES
Epidural    Patient location during procedure: OB   Reason for block: primary anesthetic   Reason for block: labor analgesia requested by patient and obstetrician  Diagnosis: IUP   Start time: 6/28/2022 5:35 PM  Timeout: 6/28/2022 5:34 PM  End time: 6/28/2022 5:38 PM  Surgery related to: Vaginal Delivery    Staffing  Performing Provider: Alix Helm MD  Authorizing Provider: Alix Helm MD        Preanesthetic Checklist  Completed: patient identified, IV checked, site marked, risks and benefits discussed, surgical consent, monitors and equipment checked, pre-op evaluation, timeout performed, anesthesia consent given, hand hygiene performed and patient being monitored  Preparation  Patient position: sitting  Prep: ChloraPrep  Patient monitoring: Pulse Ox  Reason for block: primary anesthetic   Epidural  Skin Anesthetic: lidocaine 1%  Skin Wheal: 3 mL  Administration type: continuous  Approach: midline  Interspace: L3-4    Injection technique: CHANTELLE saline  Needle and Epidural Catheter  Needle type: Tuohy   Needle gauge: 17  Needle length: 3.5 inches  Needle insertion depth: 6 cm  Catheter type: springwSoftfront  Catheter size: 19 G  Catheter at skin depth: 10 cm  Insertion Attempts: 1  Test dose: 3 mL of lidocaine 1.5% with Epi 1-to-200,000  Additional Documentation: incremental injection, negative aspiration for heme and CSF, no paresthesia on injection, no signs/symptoms of IV or SA injection, no significant pain on injection and no significant complaints from patient  Needle localization: anatomical landmarks  Medications:  Volume per aspiration: 5 mL  Time between aspirations: 5 minutes   Assessment  Ease of block: easy  Patient's tolerance of the procedure: comfortable throughout block and no complaints  Additional Notes  B0.125% 5+5mL + fentanyl 100mcg administered via epidural No inadvertent dural puncture with Tuohy.  Dural puncture performed with spinal needle.

## 2022-06-28 NOTE — PROGRESS NOTES
06/28/22 1826   [facility] Note - Strip   Strip Reviewed by [facility] Nurse? Yes   [facility] Note - Communication   Tina Nurse Communicated with Bedside Nurse Regarding: Fetal Status   [facility] Note - Notification   Nurse Notified? Yes  (called unit and spoke with Claudia who confirmed that staff was at bedside)   Name of Nurse Claudia

## 2022-06-28 NOTE — ED PROVIDER NOTES
Encounter Date: 2022       History     Chief Complaint   Patient presents with    Contractions     Patsy Rosenthal is a 31 y.o.  at 39w3d gestation presenting for contractions.     Denies vaginal bleeding or loss of fluid. Reports normal fetal movement. This IUP is complicated by MO (BMI 39), arcuate vs septate uterus, failed 1hr/passed 3hr, possible fetal outlet VSD.         Review of patient's allergies indicates:  No Known Allergies  Past Medical History:   Diagnosis Date    Depression      Past Surgical History:   Procedure Laterality Date    wisdom tooth removal       Family History   Problem Relation Age of Onset    Skin cancer Father     Breast cancer Neg Hx     Colon cancer Neg Hx     Ovarian cancer Neg Hx      Social History     Tobacco Use    Smoking status: Never Smoker    Smokeless tobacco: Never Used   Substance Use Topics    Alcohol use: Not Currently     Alcohol/week: 1.0 standard drink     Types: 1 Glasses of wine per week    Drug use: No     Review of Systems   Constitutional: Negative for chills and fever.   HENT: Negative for congestion and sore throat.    Eyes: Negative for visual disturbance.   Respiratory: Negative for cough and shortness of breath.    Cardiovascular: Negative for chest pain.   Gastrointestinal: Positive for abdominal pain. Negative for constipation, diarrhea, nausea and vomiting.   Genitourinary: Negative for dysuria, vaginal bleeding and vaginal discharge.   Musculoskeletal: Negative for back pain.   Skin: Negative for rash.   Neurological: Negative for weakness and headaches.   Hematological: Does not bruise/bleed easily.       Physical Exam     Initial Vitals   BP Pulse Resp Temp SpO2   22 1431 22 1631 22 1708 22 1434 22 1635   122/81 74 16 98.3 °F (36.8 °C) 99 %      MAP       --                Physical Exam    Nursing note and vitals reviewed.  Constitutional: She appears well-developed and well-nourished.   HENT:   Head:  Normocephalic and atraumatic.   Eyes: Conjunctivae and EOM are normal. Pupils are equal, round, and reactive to light.   Neck: Neck supple.   Normal range of motion.  Cardiovascular: Normal rate.   Pulmonary/Chest: No respiratory distress.   Genitourinary:    Vagina normal.     Musculoskeletal:         General: Normal range of motion.      Cervical back: Normal range of motion and neck supple.     Neurological: She is alert and oriented to person, place, and time. She has normal strength and normal reflexes.   Skin: Skin is warm and dry.   Psychiatric: She has a normal mood and affect. Her behavior is normal. Judgment and thought content normal.     OB LABOR EXAM:   Pre-Term Labor: No.   Membranes ruptured: No.   Method: Sterile vaginal exam per MD.   Vaginal Bleeding: bloody show and brown.     Dilatation: 5.   Station: -3.   Effacement: 80%.   Amniotic Fluid Color: no fluid.           ED Course   Obtain Fetal nonstress test (NST)    Date/Time: 6/28/2022 4:06 PM  Performed by: Marivel Grey MD  Authorized by: Sofya Gray MD     Nonstress Test:     Variability:  6-25 BPM    Decelerations:  None    Accelerations:  15 bpm    Baseline:  130    Contractions:  Regular    Contraction Frequency:  3-5  Biophysical Profile:     Nonstress Test Interpretation: reactive      Overall Impression:  Reassuring  Post-procedure:     Patient tolerance:  Patient tolerated the procedure well with no immediate complications      Labs Reviewed   POCT URINALYSIS, DIPSTICK OR TABLET REAGENT, AUTOMATED, WITH MICROSCOP          Imaging Results    None          Medications   lactated ringers bolus 1,000 mL (has no administration in time range)   lactated ringers bolus 1,000 mL (has no administration in time range)   lactated ringers bolus 500 mL (has no administration in time range)   lactated ringers infusion ( Intravenous Verify Only 6/28/22 2334)   0.9%  NaCl infusion ( Intra-Catheter Verify Only 6/28/22 2334)   carboprost injection  250 mcg (has no administration in time range)   methylergonovine injection 200 mcg (has no administration in time range)   miSOPROStoL tablet 800 mcg (has no administration in time range)   diphenoxylate-atropine 2.5-0.025 mg per tablet 1 tablet (has no administration in time range)   tranexamic acid (CYKLOKAPRON) 1,000 mg in sodium chloride 0.9 % 100 mL (ready to mix system) (has no administration in time range)   ondansetron disintegrating tablet 8 mg (8 mg Oral Given 6/29/22 0010)   prochlorperazine injection Soln 5 mg (has no administration in time range)   calcium carbonate 200 mg calcium (500 mg) chewable tablet 500 mg (500 mg Oral Given 6/28/22 1942)   LIDOcaine HCL 10 mg/ml (1%) injection 10 mL (has no administration in time range)   azithromycin 500 mg in dextrose 5 % 250 mL IVPB (ready to mix system) (has no administration in time range)   cefazolin (ANCEF) 2 gram in dextrose 5% 50 mL IVPB (premix) (has no administration in time range)   BUPivacaine (PF) 0.25% (2.5 mg/ml) (MARCAINE) 0.25 % (2.5 mg/mL) injection (has no administration in time range)   lactated ringers bolus 1,000 mL (has no administration in time range)   sodium citrate-citric acid 500-334 mg/5 ml solution 30 mL (has no administration in time range)   fentanyl 2 mcg/mL with BUPivacaine 0.1% in sodium chloride 0.9% Epidural (10 mL/hr Epidural New Bag 6/28/22 2930)   oxytocin 30 units in 500 mL lactated ringers infusion (titrating) (6 quinn-units/min Intravenous Verify Only 6/28/22 6174)   oxytocin 30 units in 500 mL lactated ringers infusion (non-titrating) (has no administration in time range)   lanolin cream (has no administration in time range)   benzocaine-lanolin (DERMOPLAST) topical spray (has no administration in time range)   hydrocortisone 2.5 % rectal cream (has no administration in time range)   docusate sodium capsule 200 mg (200 mg Oral Given 6/30/22 1320)   simethicone chewable tablet 80 mg (has no administration in time range)    diphenhydrAMINE capsule 25 mg (has no administration in time range)   diphenhydrAMINE injection 25 mg (has no administration in time range)   prenatal vitamin oral tablet (has no administration in time range)   acetaminophen tablet 650 mg (has no administration in time range)   ibuprofen tablet 600 mg (600 mg Oral Not Given 7/1/22 0600)   HYDROcodone-acetaminophen 5-325 mg per tablet 1 tablet (has no administration in time range)   HYDROcodone-acetaminophen  mg per tablet 1 tablet (has no administration in time range)   oxytocin 30 units in 500 mL lactated ringers infusion (non-titrating) (334 quinn-units/min Intravenous New Bag 6/29/22 0139)   oxytocin 30 units in 500 mL lactated ringers infusion (non-titrating) (95 quinn-units/min Intravenous New Bag 6/29/22 0210)   fentaNYL (SUBLIMAZE) 50 mcg/mL injection (  Override pull for Anesthesia 6/28/22 1751)   fentanyl 2mcg/mL with BUPivacaine 0.1% in sdoium chloride 0.9% Epidural 2 mcg/mL- 0.1 % Soln (  Override pull for Anesthesia 6/28/22 3690)     Medical Decision Making:   ED Management:  VSS   NST RR   SVE 3/80/-3>> 5/80/-3 after 1.5 hours   Vertex by BSUS   Consents in media   To L&D for labor . See H&P for details.  ..  Other:   I have discussed this case with another health care provider.            Attending Attestation:   Physician Attestation Statement for Resident:  As the supervising MD   Physician Attestation Statement: I have personally seen and examined this patient.   I agree with the above history. -:   As the supervising MD I agree with the above PE.    As the supervising MD I agree with the above treatment, course, plan, and disposition.   -: Patient evaluated and found to be stable, agree with resident's assessment and plan.  NST reactive and reassuring, toco with contractions every 2-3 minutes.  Exam c/w labor.  Will admit to L&D    I was personally present during the critical portions of the procedure(s) performed by the resident and was  immediately available in the ED to provide services and assistance as needed during the entire procedure.  I have reviewed the following: old records at this facility.                         Clinical Impression:   Final diagnoses:  [O80, Z37.9] Normal labor (Primary)  [Z3A.39] 39 weeks gestation of pregnancy          ED Disposition Condition    Send to L&JL Navas MD  07/01/22 8170

## 2022-06-28 NOTE — ED TRIAGE NOTES
Pt presents to [facility] c/o contractions that occur every 2-4 min.  Denies VB, LOF, and endorses FM.  Does report increased vaginal mucous.  Dr. Gray notified of pt's arrival.

## 2022-06-28 NOTE — H&P
HISTORY AND PHYSICAL                                                OBSTETRICS          Subjective:       Patsy Rosenthal is a 31 y.o.  female with IUP at 39w3d weeks gestation who presents with normal labor.    Patient reports contractions, reports small amount of vaginal bleeding, denies LOF.   Fetal Movement: normal.    This IUP is complicated by obesity, abnormal GTT, possible fetal VSD, abnormal uterine contour (arcuate v septate).    Review of Systems   Constitutional: Negative for chills and fever.   Respiratory: Negative for cough and shortness of breath.    Cardiovascular: Negative for chest pain.   Gastrointestinal: Positive for abdominal pain (contractions ). Negative for nausea and vomiting.   Endocrine: Negative for diabetes.   Genitourinary: Positive for vaginal bleeding (brown-tinged mucus discharge).   Musculoskeletal: Negative for back pain.   Neurological: Negative for headaches.   Psychiatric/Behavioral: Negative for depression.       PMHx:   Past Medical History:   Diagnosis Date    Depression        PSHx:   Past Surgical History:   Procedure Laterality Date    wisdom tooth removal         All: Review of patient's allergies indicates:  No Known Allergies    Meds: (Not in a hospital admission)      SH:   Social History     Socioeconomic History    Marital status:    Tobacco Use    Smoking status: Never Smoker    Smokeless tobacco: Never Used   Substance and Sexual Activity    Alcohol use: Not Currently     Alcohol/week: 1.0 standard drink     Types: 1 Glasses of wine per week    Drug use: No    Sexual activity: Yes     Partners: Male     Birth control/protection: None   Social History Narrative    ** Merged History Encounter **            FH:   Family History   Problem Relation Age of Onset    Skin cancer Father     Breast cancer Neg Hx     Colon cancer Neg Hx     Ovarian cancer Neg Hx        OBHx:   OB History    Para Term  AB Living   1 0 0 0 0 0   SAB  IAB Ectopic Multiple Live Births   0 0 0 0 0      # Outcome Date GA Lbr Addison/2nd Weight Sex Delivery Anes PTL Lv   1 Current                Objective:       /81   Temp 98.3 °F (36.8 °C) (Oral)   LMP 09/14/2021 (Exact Date)     Vitals:    06/28/22 1431 06/28/22 1434   BP: 122/81    Temp:  98.3 °F (36.8 °C)   TempSrc:  Oral       General:   alert, appears stated age and cooperative, no apparent distress   HENT:  normocephalic, atraumatic   Eyes:  extraocular movements and conjunctivae normal   Neck:  supple, range of motion normal, no thyromegaly   Lungs:   no respiratory distress   Heart:   regular rate   Abdomen:  soft, non-tender, non-distended but gravid, no rebound or guarding    Extremities negative edema, negative erythema   FHT: Baseline 130s, moderate BTBV, +accels, -decels;  Cat 1 (reassuring)                 TOCO: Q 3-5 minutes   Presentations: cephalic by ultrasound   Cervix:     Dilation: 5 cm     Effacement: 80%    Station:  -3    Consistency: soft    Position: middle       Lab Review  Blood Type O POS  GBBS: negative  Rubella: Immune  RPR: NR  HIV: negative  HepB: negative       Assessment:       39w3d weeks gestation with normal labor     There are no hospital problems to display for this patient.         Plan:      1. Labor   - Risks, benefits, alternatives and possible complications have been discussed in detail with the patient.   - Consents signed and to chart  - Admit to Labor and Delivery unit  - pitocin augmentation PRN    - Epidural per Anesthesia  - Draw CBC, T&S  - Notify Staff  - Recheck in 2 hrs or PRN  - EFW 7.5 lbs by [facility]   Post-Partum Hemorrhage risk - low    2. Obesity   - BMI 39   - No indication for lovenox ppx     3. Abnormal GTT   - failed 1 hour, passed 3 hour     4. Fetal cardiac anomaly   - 27 week u/s with findings concerning for outlet VSD   - normal fetal echo on 4/7/22   - follow up after delivery with peds cards     5. Abnormal uterine contour   - possible  arcuate vs septate uterine contour noted on dating u/s     Marivel Grey MD   PGY-3, OB-GYN

## 2022-06-28 NOTE — ANESTHESIA PREPROCEDURE EVALUATION
[facility]  Anesthesia Pre-Operative Evaluation         Patient Name: Patsy Rosenthal  YOB: 1990  MRN: [id number]    2022      Patsy Rosenthal is a 31 y.o. female  @ 39w3d who presents in active labor. She denies any complications this pregnancy. She denies HTN, DM, asthma, coagulopathies, spinal pathology.     OB History    Para Term  AB Living   1 0 0 0 0     SAB IAB Ectopic Multiple Live Births   0 0 0          # Outcome Date GA Lbr Addison/2nd Weight Sex Delivery Anes PTL Lv   1 Current                Review of patient's allergies indicates:  No Known Allergies    Wt Readings from Last 1 Encounters:   22 1507 111.1 kg (244 lb 14.9 oz)       BP Readings from Last 3 Encounters:   22 122/81   22 110/70   22 110/70       Patient Active Problem List   Diagnosis    Suspected fetal anomaly not found    Normal labor    Uterine anomaly    Obesity affecting pregnancy in third trimester       Past Surgical History:   Procedure Laterality Date    wisdom tooth removal         Social History     Socioeconomic History    Marital status:    Tobacco Use    Smoking status: Never Smoker    Smokeless tobacco: Never Used   Substance and Sexual Activity    Alcohol use: Not Currently     Alcohol/week: 1.0 standard drink     Types: 1 Glasses of wine per week    Drug use: No    Sexual activity: Yes     Partners: Male     Birth control/protection: None   Social History Narrative    ** Merged History Encounter **              Chemistry        Component Value Date/Time     (L) 2021 1512    K 4.9 2021 1512     2021 1512    CO2 21 (L) 2021 1512    BUN 15 2021 1512    CREATININE 0.9 2021 1512    GLU 86 2021 1512        Component Value Date/Time    CALCIUM 9.4 2021 1512    ALKPHOS 47 2018 1138    AST 15 2018 1138    ALT 14 2018 1138     BILITOT 0.3 03/22/2018 1138    ESTGFRAFRICA >60 11/19/2021 1512    EGFRNONAA >60 11/19/2021 1512            Lab Results   Component Value Date    WBC 9.23 04/01/2022    HGB 12.2 04/01/2022    HCT 36.8 (L) 04/01/2022    MCV 95 04/01/2022     04/01/2022       No results for input(s): PT, INR, PROTIME, APTT in the last 72 hours.            Pre-op Assessment          Review of Systems  Anesthesia Hx:  Denies Family Hx of Anesthesia complications.   Denies Personal Hx of Anesthesia complications.   Social:  Non-Smoker, No Alcohol Use    Cardiovascular:  Cardiovascular Normal     Pulmonary:  Pulmonary Normal    Endocrine:  Endocrine Normal        Physical Exam  General: Well nourished and Cooperative    Airway:  Mallampati: II / I      Dental:  Intact        Anesthesia Plan  Type of Anesthesia, risks & benefits discussed:    Anesthesia Type: Gen ETT, Epidural  Intra-op Monitoring Plan: Standard ASA Monitors  Post Op Pain Control Plan: multimodal analgesia  Induction:  IV  Airway Plan: Video, Post-Induction  Informed Consent: Informed consent signed with the Patient and all parties understand the risks and agree with anesthesia plan.  All questions answered.   ASA Score: 3  Day of Surgery Review of History & Physical: H&P Update referred to the surgeon/provider.    Ready For Surgery From Anesthesia Perspective.     .

## 2022-06-29 PROBLEM — Q51.9 UTERINE ANOMALY: Status: RESOLVED | Noted: 2022-06-28 | Resolved: 2022-06-29

## 2022-06-29 PROBLEM — Z37.9 NORMAL LABOR: Status: RESOLVED | Noted: 2022-06-28 | Resolved: 2022-06-29

## 2022-06-29 PROBLEM — O13.3 GESTATIONAL HYPERTENSION, THIRD TRIMESTER: Status: ACTIVE | Noted: 2022-06-29

## 2022-06-29 PROBLEM — O99.213 OBESITY AFFECTING PREGNANCY IN THIRD TRIMESTER: Status: RESOLVED | Noted: 2022-06-28 | Resolved: 2022-06-29

## 2022-06-29 LAB
ALBUMIN SERPL BCP-MCNC: 2.6 G/DL (ref 3.5–5.2)
ALLENS TEST: ABNORMAL
ALLENS TEST: ABNORMAL
ALP SERPL-CCNC: 100 U/L (ref 55–135)
ALT SERPL W/O P-5'-P-CCNC: 26 U/L (ref 10–44)
ANION GAP SERPL CALC-SCNC: 10 MMOL/L (ref 8–16)
AST SERPL-CCNC: 32 U/L (ref 10–40)
BILIRUB SERPL-MCNC: 0.5 MG/DL (ref 0.1–1)
BUN SERPL-MCNC: 9 MG/DL (ref 6–20)
CALCIUM SERPL-MCNC: 8.9 MG/DL (ref 8.7–10.5)
CHLORIDE SERPL-SCNC: 105 MMOL/L (ref 95–110)
CO2 SERPL-SCNC: 20 MMOL/L (ref 23–29)
CREAT SERPL-MCNC: 0.8 MG/DL (ref 0.5–1.4)
EST. GFR  (AFRICAN AMERICAN): >60 ML/MIN/1.73 M^2
EST. GFR  (NON AFRICAN AMERICAN): >60 ML/MIN/1.73 M^2
GLUCOSE SERPL-MCNC: 138 MG/DL (ref 70–110)
HCO3 UR-SCNC: 20.2 MMOL/L (ref 24–28)
HCO3 UR-SCNC: 23 MMOL/L (ref 24–28)
HCT VFR BLD CALC: 51 %PCV (ref 36–54)
HCT VFR BLD CALC: 55 %PCV (ref 36–54)
HGB BLD-MCNC: 17 G/DL
HGB BLD-MCNC: 19 G/DL
PCO2 BLDA: 43.2 MMHG (ref 35–45)
PCO2 BLDA: 54.9 MMHG (ref 35–45)
PH SMN: 7.23 [PH] (ref 7.35–7.45)
PH SMN: 7.28 [PH] (ref 7.35–7.45)
PO2 BLDA: 16 MMHG (ref 80–100)
PO2 BLDA: 25 MMHG (ref 80–100)
POC BE: -5 MMOL/L
POC BE: -7 MMOL/L
POC IONIZED CALCIUM: 1.58 MMOL/L (ref 1.06–1.42)
POC IONIZED CALCIUM: 1.68 MMOL/L (ref 1.06–1.42)
POC SATURATED O2: 16 % (ref 95–100)
POC SATURATED O2: 39 % (ref 95–100)
POC TCO2: 21 MMOL/L (ref 23–27)
POC TCO2: 25 MMOL/L (ref 23–27)
POTASSIUM BLD-SCNC: 4.8 MMOL/L (ref 3.5–5.1)
POTASSIUM BLD-SCNC: 5.1 MMOL/L (ref 3.5–5.1)
POTASSIUM SERPL-SCNC: 3.9 MMOL/L (ref 3.5–5.1)
PROT SERPL-MCNC: 6 G/DL (ref 6–8.4)
RPR SER QL: NORMAL
SAMPLE: ABNORMAL
SAMPLE: ABNORMAL
SITE: ABNORMAL
SITE: ABNORMAL
SODIUM BLD-SCNC: 131 MMOL/L (ref 136–145)
SODIUM BLD-SCNC: 136 MMOL/L (ref 136–145)
SODIUM SERPL-SCNC: 135 MMOL/L (ref 136–145)

## 2022-06-29 PROCEDURE — 72100003 HC LABOR CARE, EA. ADDL. 8 HRS

## 2022-06-29 PROCEDURE — 82803 BLOOD GASES ANY COMBINATION: CPT

## 2022-06-29 PROCEDURE — 72100002 HC LABOR CARE, 1ST 8 HOURS

## 2022-06-29 PROCEDURE — 59400 PR FULL ROUT OBSTE CARE,VAGINAL DELIV: ICD-10-PCS | Mod: ,,, | Performed by: OBSTETRICS & GYNECOLOGY

## 2022-06-29 PROCEDURE — 80053 COMPREHEN METABOLIC PANEL: CPT

## 2022-06-29 PROCEDURE — 36416 COLLJ CAPILLARY BLOOD SPEC: CPT

## 2022-06-29 PROCEDURE — 59400 OBSTETRICAL CARE: CPT | Mod: ,,, | Performed by: OBSTETRICS & GYNECOLOGY

## 2022-06-29 PROCEDURE — 25000003 PHARM REV CODE 250: Performed by: STUDENT IN AN ORGANIZED HEALTH CARE EDUCATION/TRAINING PROGRAM

## 2022-06-29 PROCEDURE — 11000001 HC ACUTE MED/SURG PRIVATE ROOM

## 2022-06-29 PROCEDURE — 25000003 PHARM REV CODE 250

## 2022-06-29 PROCEDURE — 63600175 PHARM REV CODE 636 W HCPCS: Performed by: STUDENT IN AN ORGANIZED HEALTH CARE EDUCATION/TRAINING PROGRAM

## 2022-06-29 PROCEDURE — 99900035 HC TECH TIME PER 15 MIN (STAT)

## 2022-06-29 PROCEDURE — 72200005 HC VAGINAL DELIVERY LEVEL II

## 2022-06-29 PROCEDURE — 36415 COLL VENOUS BLD VENIPUNCTURE: CPT

## 2022-06-29 RX ORDER — ACETAMINOPHEN 325 MG/1
650 TABLET ORAL EVERY 6 HOURS PRN
Status: DISCONTINUED | OUTPATIENT
Start: 2022-06-29 | End: 2022-07-01 | Stop reason: HOSPADM

## 2022-06-29 RX ORDER — IBUPROFEN 600 MG/1
600 TABLET ORAL EVERY 6 HOURS
Status: DISCONTINUED | OUTPATIENT
Start: 2022-06-29 | End: 2022-07-01 | Stop reason: HOSPADM

## 2022-06-29 RX ORDER — SODIUM CHLORIDE 0.9 % (FLUSH) 0.9 %
10 SYRINGE (ML) INJECTION
Status: CANCELLED | OUTPATIENT
Start: 2022-06-29

## 2022-06-29 RX ORDER — SIMETHICONE 80 MG
1 TABLET,CHEWABLE ORAL EVERY 6 HOURS PRN
Status: DISCONTINUED | OUTPATIENT
Start: 2022-06-29 | End: 2022-07-01 | Stop reason: HOSPADM

## 2022-06-29 RX ORDER — HYDROCORTISONE 25 MG/G
CREAM TOPICAL 3 TIMES DAILY PRN
Status: DISCONTINUED | OUTPATIENT
Start: 2022-06-29 | End: 2022-07-01 | Stop reason: HOSPADM

## 2022-06-29 RX ORDER — METHYLERGONOVINE MALEATE 0.2 MG/ML
200 INJECTION INTRAVENOUS
Status: DISCONTINUED | OUTPATIENT
Start: 2022-06-29 | End: 2022-07-01 | Stop reason: HOSPADM

## 2022-06-29 RX ORDER — METHYLERGONOVINE MALEATE 0.2 MG/ML
INJECTION INTRAVENOUS
Status: DISCONTINUED
Start: 2022-06-29 | End: 2022-06-29 | Stop reason: WASHOUT

## 2022-06-29 RX ORDER — MISOPROSTOL 200 UG/1
800 TABLET ORAL
Status: DISCONTINUED | OUTPATIENT
Start: 2022-06-29 | End: 2022-07-01 | Stop reason: HOSPADM

## 2022-06-29 RX ORDER — CARBOPROST TROMETHAMINE 250 UG/ML
250 INJECTION, SOLUTION INTRAMUSCULAR
Status: DISCONTINUED | OUTPATIENT
Start: 2022-06-29 | End: 2022-07-01 | Stop reason: HOSPADM

## 2022-06-29 RX ORDER — HYDROCODONE BITARTRATE AND ACETAMINOPHEN 10; 325 MG/1; MG/1
1 TABLET ORAL EVERY 4 HOURS PRN
Status: DISCONTINUED | OUTPATIENT
Start: 2022-06-29 | End: 2022-07-01 | Stop reason: HOSPADM

## 2022-06-29 RX ORDER — HYDROCODONE BITARTRATE AND ACETAMINOPHEN 5; 325 MG/1; MG/1
1 TABLET ORAL EVERY 4 HOURS PRN
Status: DISCONTINUED | OUTPATIENT
Start: 2022-06-29 | End: 2022-07-01 | Stop reason: HOSPADM

## 2022-06-29 RX ORDER — DIPHENHYDRAMINE HYDROCHLORIDE 50 MG/ML
25 INJECTION INTRAMUSCULAR; INTRAVENOUS EVERY 4 HOURS PRN
Status: DISCONTINUED | OUTPATIENT
Start: 2022-06-29 | End: 2022-07-01 | Stop reason: HOSPADM

## 2022-06-29 RX ORDER — DIPHENHYDRAMINE HCL 25 MG
25 CAPSULE ORAL EVERY 4 HOURS PRN
Status: DISCONTINUED | OUTPATIENT
Start: 2022-06-29 | End: 2022-07-01 | Stop reason: HOSPADM

## 2022-06-29 RX ORDER — PRENATAL WITH FERROUS FUM AND FOLIC ACID 3080; 920; 120; 400; 22; 1.84; 3; 20; 10; 1; 12; 200; 27; 25; 2 [IU]/1; [IU]/1; MG/1; [IU]/1; MG/1; MG/1; MG/1; MG/1; MG/1; MG/1; UG/1; MG/1; MG/1; MG/1; MG/1
1 TABLET ORAL DAILY
Status: DISCONTINUED | OUTPATIENT
Start: 2022-06-29 | End: 2022-07-01 | Stop reason: HOSPADM

## 2022-06-29 RX ORDER — OXYTOCIN/RINGER'S LACTATE 30/500 ML
95 PLASTIC BAG, INJECTION (ML) INTRAVENOUS ONCE
Status: DISCONTINUED | OUTPATIENT
Start: 2022-06-29 | End: 2022-07-01 | Stop reason: HOSPADM

## 2022-06-29 RX ORDER — MISOPROSTOL 200 UG/1
TABLET ORAL
Status: DISCONTINUED
Start: 2022-06-29 | End: 2022-06-29 | Stop reason: WASHOUT

## 2022-06-29 RX ORDER — PROCHLORPERAZINE EDISYLATE 5 MG/ML
5 INJECTION INTRAMUSCULAR; INTRAVENOUS EVERY 6 HOURS PRN
Status: CANCELLED | OUTPATIENT
Start: 2022-06-29

## 2022-06-29 RX ORDER — TRANEXAMIC ACID 100 MG/ML
INJECTION, SOLUTION INTRAVENOUS
Status: DISCONTINUED
Start: 2022-06-29 | End: 2022-06-29 | Stop reason: WASHOUT

## 2022-06-29 RX ORDER — DOCUSATE SODIUM 100 MG/1
200 CAPSULE, LIQUID FILLED ORAL 2 TIMES DAILY PRN
Status: DISCONTINUED | OUTPATIENT
Start: 2022-06-29 | End: 2022-07-01 | Stop reason: HOSPADM

## 2022-06-29 RX ORDER — DIPHENOXYLATE HYDROCHLORIDE AND ATROPINE SULFATE 2.5; .025 MG/1; MG/1
1 TABLET ORAL 4 TIMES DAILY PRN
Status: DISCONTINUED | OUTPATIENT
Start: 2022-06-29 | End: 2022-07-01 | Stop reason: HOSPADM

## 2022-06-29 RX ORDER — ONDANSETRON 8 MG/1
8 TABLET, ORALLY DISINTEGRATING ORAL EVERY 8 HOURS PRN
Status: CANCELLED | OUTPATIENT
Start: 2022-06-29

## 2022-06-29 RX ADMIN — Medication 95 MILLI-UNITS/MIN: at 02:06

## 2022-06-29 RX ADMIN — ONDANSETRON 8 MG: 8 TABLET, ORALLY DISINTEGRATING ORAL at 12:06

## 2022-06-29 RX ADMIN — IBUPROFEN 600 MG: 600 TABLET ORAL at 01:06

## 2022-06-29 RX ADMIN — DOCUSATE SODIUM 200 MG: 100 CAPSULE, LIQUID FILLED ORAL at 08:06

## 2022-06-29 RX ADMIN — Medication 334 MILLI-UNITS/MIN: at 01:06

## 2022-06-29 RX ADMIN — IBUPROFEN 600 MG: 600 TABLET ORAL at 06:06

## 2022-06-29 RX ADMIN — IBUPROFEN 600 MG: 600 TABLET ORAL at 08:06

## 2022-06-29 NOTE — PROGRESS NOTES
06/29/22 0035   Tele[facility] Note - Strip   Strip Reviewed by [facility] Nurse? Yes   [facility] Note - Communication   [place] Nurse Communicated with Bedside Nurse Regarding: Fetal Status   [facility] Note - Notification   Nurse Notified? Yes   Name of Nurse Lolly

## 2022-06-29 NOTE — PROGRESS NOTES
LABOR NOTE    S:  Complaints: No.  Epidural working:  yes      O: /67   Pulse 79   Temp 96.6 °F (35.9 °C) (Temporal)   Resp 16   LMP 09/14/2021 (Exact Date)   SpO2 100%   Breastfeeding No     FHT: baseline 150, minimal to moderate variability, no accels, no decels category I reassuring  CTX: q irregular  SVE: 6/80/-1, IUPC placed    PLAN:  Continue Close Maternal/Fetal Monitoring  Plan to start pitocin  Recheck 2 hours or PRN    Claudia Hills MD  PGY-2 OB/GYN

## 2022-06-29 NOTE — LACTATION NOTE
Latch assistance given. Helped pt position the baby in football hold . Baby rooted and latched easily yo the breast. Breast compression used to keep the baby actively feeding.Breastfeeding basics reviewed. Questions answered. Pt encouraged to feed the baby 8 or more times in 24hrs on cue until content.

## 2022-06-29 NOTE — DISCHARGE INSTRUCTIONS
Discharge Instructions    The AAP recommends exclusive breastfeeding for about the first 6 months of age and as solid foods are introduced, continued breastfeeding  for at least 1 year or longer.    Feed the baby at the earliest sign of hunger or comfort (see signs on the back cover of the Mother's Breastfeeding Guide)  Hands to mouth, sucking motions  Rooting or searching for something to suck on  Dont wait for crying - it is a sign of distress    The feedings may be 8-12 times per 24hrs and will not follow a schedule  Avoid pacifiers and bottles for the first 4 weeks  Alternate the breast you start the feeding with, or start with the breast that feels the fullest  Switch breasts when the baby takes himself off the breast or falls asleep  Keep offering breasts until the baby looks full, no longer gives hunger signs, and stays asleep when placed on his back in the crib  If the baby is sleepy and wont wake for a feeding, put the baby skin-to-skin dressed in a diaper against the mothers bare chest  Sleep with your baby in your room near you  The baby should be positioned and latched on to the breast correctly  Chest-to-chest, chin in the breast  Babys lips are flipped outward  Babys mouth is stretched open wide like a shout  Babys sucking should feel like tugging to the mother  The baby should be drinking at the breast:  You should hear swallowing or gulping throughout the feeding  You should see milk on the babys lips when he comes off the breast  Your breasts should be softer when the baby is finished feeding  The baby should look relaxed at the end of feedings  After the 4th day and your milk is in:  The babys poop should turn bright yellow and be loose, watery, and seedy  The baby should have at least 3-4 poops the size of the palm of your hand per day  The baby should have at least 5-6 wet diapers per day  The urine should be light yellow in color  You should drink when you are thirsty and eat a  healthy diet when you are    hungry.     Take naps to get the rest you need.   Take medications and/or drink alcohol only with permission of your obstetrician    or the babys pediatrician.  You can also call the Infant Risk Center,   ([phone]), Monday-Friday, 8am-5pm Central time, to get the most   up-to-date evidence-based information on the use of medications during   pregnancy and breastfeeding.      Complete the First Alert form in the Mother's Breastfeeding Guide 3-5 days after the baby's birth.  Please call the Breastfeeding Warmline ([phone]) or the baby's pediatrician if you have any concerns.    The baby should be examined by a pediatrician at 3-5 days of age and again at 2 weeks of age.  Once your milk production increases, the baby should be gaining at least ½ - 1oz each day and should be back to birthweight no later than 10-14 days of age.  If this is not the case, please call the Breastfeeding Warmline ([phone]) for assistance and support.    Community Resources    [facility] Breastfeeding Warmline: [phone]   Local [facility] clinics: provide incentives and breastpumps to eligible mothers 2-087-895-BABY  La Leche League International (LLLI):  mother-to-mother support group website        www.lll.FTL SOLAR  [facility] La Leche League mother-to-mother support groups:        www.lllChar Software.Bookmycab        La Leche League [facility]   Dr. Jae Keller website for latch videos and general information:        www.breastfeedinginc.ca  [facility]ant Risk Center is a call center that provides information about the safety of taking medications while breastfeeding.  Call 4-266-530-6679, M-F, 8am-5pm, [place].  International Lactation Consultant Association provides resources for assistance:        www.ilca.org  [place] Breastfeeding Coalition provides informationand resources for parents  and the community    www.LaBreastfeedingSupport.org  Roxie Melgoza is a mom-to-mom support group:                              www.nolanesting.com//breastfeedng-support/  [employer] for Healthy Babies:  3-797-983-BABY(1794)  [employer]: a breastfeeding support group for women of color, 903.820.6505

## 2022-06-29 NOTE — LACTATION NOTE
Lactation visited pt. Pt wants to pump and bottle feed. Offered to help pt get started pumping. Pt asked to wait and will call when she is ready. LC number on the whiteboard.

## 2022-06-29 NOTE — PROGRESS NOTES
LABOR NOTE    S:  Complaints: No.  Epidural working:  yes  MD to bedside for FHT deceleration    O: BP (!) 140/78   Pulse (!) 112   Temp 99.1 °F (37.3 °C) (Oral)   Resp 18   LMP 09/14/2021 (Exact Date)   SpO2 99%   Breastfeeding No     FHT: baseline 160, moderate variability, no accels, + variable decels category 2   CTX: q 2-3 minutes  SVE: 9/90/0    PLAN:  Continue Close Maternal/Fetal Monitoring  Pitocin augmentation per protocol  Continue amnioinfusion for variable decels  Recheck 1-2 hours or PRN    Claudia Hills MD  PGY-2 OB/GYN

## 2022-06-29 NOTE — NURSING
Pt was unable to pee, sterile in and out cath performed by Rn. 500 ml of urine drained from bladder

## 2022-06-29 NOTE — PROGRESS NOTES
LABOR NOTE    S:  Complaints: No.  Epidural working:  yes      O: /65   Pulse 64   Temp 96.8 °F (36 °C) (Temporal)   Resp 18   LMP 09/14/2021 (Exact Date)   SpO2 99%   Breastfeeding No     FHT: baseline 160, moderate variability, no accels, + variable decels category 2 overall reassuring  CTX: q 2-3 minutes  SVE: 8/90/0    PLAN:  Continue Close Maternal/Fetal Monitoring  Pitocin augmentation per protocol  Continue amnioinfusion for variable decels  Continue to rotate patient   Recheck 2 hours or PRN    Claudia Hills MD  PGY-2 OB/GYN

## 2022-06-29 NOTE — PROGRESS NOTES
LABOR NOTE    S:  Complaints: No.  Epidural working:  yes      O: /65   Pulse 64   Temp 96.8 °F (36 °C) (Temporal)   Resp 18   LMP 09/14/2021 (Exact Date)   SpO2 99%   Breastfeeding No     FHT: baseline 150, minimal to moderate variability, no accels, + variable decels category 2 reassuring  CTX: q 2-4  SVE: 6/80/-1, IUPC re-placed, amnio infusion for recurrent variables, Pit 4    PLAN:  Continue Close Maternal/Fetal Monitoring  Plan to start pitocin  Recheck 2 hours or PRN    Gerri Arteaga MD  PGY 2  Obstetrics and Gynecology

## 2022-06-29 NOTE — LACTATION NOTE
Latch assistance given. Helped pt latch the baby deeper on the breast with good pulls and tugs. Breast compression helped the pt keep the baby actively feeding. Pt encouraged to feed the baby 8 or more times on 24hrs or more on cue until content.    06/29/22 1300   Maternal Assessment   Breast Shape Bilateral:;round   Breast Density soft   Areola elastic   Nipples everted;short   Maternal Infant Feeding   Maternal Emotional State assist needed   Infant Positioning clutch/football   Signs of Milk Transfer infant jaw motion present   Latch Assistance yes   Lactation Referrals   Lactation Referrals outpatient lactation program;support group

## 2022-06-29 NOTE — L&D DELIVERY NOTE
[facility] - Labor & Delivery  Vaginal Delivery   Operative Note    SUMMARY   This delivery was complicated by a shoulder dystocia    · After the head delivered, the left shoulder was noted to be anterior and held behind the pubic symphysis.  · Immediately at the identification of dystocia, I asked the nurse to note the time and call assistance to the room  ·  There was enough room for manuevers and an episiotomy was not required.  · Maneuvers Required to relieve the dystocia included: Zahra/Suprapubic pressure  · Gentle downward traction and NO fundal pressure were used  · Cord gases were sent  · Pediatricians/ NICU were in room at delivery  · APGARS were 8 at 1 minute and 9 at 5 minutes  · Birthweight 3420g  ·  was moving both arms without evidence of nerve injury at the time of delivery  · There was not evidence of clavicular fracture  · The Shoulder dystocia lasted a total of 20 seconds     skin to skin was unable to be performed due to need for evaluation of infant at warmer secondary to category II tracing with deep variable decelerations .  Infant delivered position CHELY over intact perineum.  Nuchal cord: No.    Spontaneous delivery of placenta and IV pitocin given noting good uterine tone.  Left labial laceration noted and repaired with interrupted sutures of 3-0 vicyrl.  Patient tolerated delivery well. Sponge needle and lap counted correctly x2.     cc    Claudia Hills MD  PGY-2 OB/GYN      Indications:  (spontaneous vaginal delivery)  Pregnancy complicated by:   Patient Active Problem List   Diagnosis    Suspected fetal anomaly not found     (spontaneous vaginal delivery)    Gestational hypertension, third trimester     Admitting GA: 39w4d    Delivery Information for Urban Rosenthal    Birth information:  YOB: 2020   Time of birth: 1:35 AM   Sex: male   Head Delivery Date/Time: 2022  1:35 AM   Delivery type: Vaginal, Spontaneous   Gestational Age:  39w4d    Delivery Providers    Delivering clinician: Jami Sullivan MD   Provider Role    CHINTAN Magdaleno, CHINTAN Rider, CHINTAN Valentino, ST Claudia Hills MD             Measurements    Weight: 3420 g  Weight (lbs): 7 lb 8.6 oz  Length:          Apgars    Living status: Living  Apgars:  1 min.:  5 min.:  10 min.:  15 min.:  20 min.:    Skin color:  0  1       Heart rate:  2  2       Reflex irritability:  2  2       Muscle tone:  2  2       Respiratory effort:  2  2       Total:  8  9       Apgars assigned by: NICU         Operative Delivery    Forceps attempted?: No  Vacuum extractor attempted?: No         Shoulder Dystocia    Shoulder dystocia present?: Yes  Time recognized: 2022 01:35:40  Time help called: 2022 01:35:41   Help called by: Sultana Cooper   Physician/Provider arrived: 2022 01:35:40   Physician/Provider: Dr. Sullivan   NICU arrived: 2022 01:35:40   Additional staff arrived: 2022 01:35:40   Additional staff: Pauline Rider   Gentle attempt at traction, assisted by maternal expulsive forces?: Yes   First maneuver: Zahra maneuver  First maneuver performed at: 2022 01:35:42  First maneuver performed by: Sultana Cooper  Second maneuver: suprapubic pressure  Second maneuver performed at: 2022 01:35:45  Second maneuver performed by: Pauline Rider           Presentation    Presentation: Vertex  Position: Left Occiput Anterior           Interventions/Resuscitation    Method: NICU Attended       Cord    Vessels: 3 vessels  Complications: None  Delayed Cord Clamping?: No  Cord Clamped Date/Time: 2022  1:35 AM  Cord Blood Disposition: Sent with Baby  Gases Sent?: Yes  Stem Cell Collection (by MD): No       Placenta    Placenta delivery date/time: 2022 0139  Placenta removal: Spontaneous  Placenta appearance: Intact  Placenta disposition: discarded           Labor Events:       labor: No     Labor Onset Date/Time:          Dilation Complete Date/Time: 2022 01:10     Start Pushing Date/Time: 2022 01:24       Start Pushing Date/Time: 2022 01:24     Rupture Date/Time: 22         Rupture type:          Fluid Amount:       Fluid Color: Clear      Fluid Odor:       Membrane Status: ARM (Artificial Rupture)               steroids: None     Antibiotics given for GBS: No     Induction: none     Indications for induction:        Augmentation: amniotomy;oxytocin     Indications for augmentation: Ineffective Contraction Pattern     Labor complications: Fetal Intolerance     Additional complications:          Cervical ripening:                     Delivery:      Episiotomy: None     Indication for Episiotomy:       Perineal Lacerations: None Repaired:      Periurethral Laceration: left Repaired: Yes   Labial Laceration: left Repaired: Yes   Sulcus Laceration:   Repaired:     Vaginal Laceration:   Repaired:     Cervical Laceration:   Repaired:     Repair suture:       Repair # of packets: 2     Last Value - EBL - Nursing (mL):       Sum - EBL - Nursing (mL): 0     Last Value - EBL - Anesthesia (mL):      Calculated QBL (mL): 210      Vaginal Sweep Performed: Yes     Surgicount Correct: Yes       Other providers:       Anesthesia    Method: Epidural          Details (if applicable):  Trial of Labor      Categorization:      Priority:     Indications for :     Incision Type:       Additional  information:  Forceps:    Vacuum:    Breech:    Observed anomalies    Other (Comments):

## 2022-06-30 LAB
BASOPHILS # BLD AUTO: 0.06 K/UL (ref 0–0.2)
BASOPHILS NFR BLD: 0.4 % (ref 0–1.9)
DIFFERENTIAL METHOD: ABNORMAL
EOSINOPHIL # BLD AUTO: 0.4 K/UL (ref 0–0.5)
EOSINOPHIL NFR BLD: 2.6 % (ref 0–8)
ERYTHROCYTE [DISTWIDTH] IN BLOOD BY AUTOMATED COUNT: 13.7 % (ref 11.5–14.5)
HCT VFR BLD AUTO: 36.6 % (ref 37–48.5)
HGB BLD-MCNC: 12.3 G/DL (ref 12–16)
IMM GRANULOCYTES # BLD AUTO: 0.1 K/UL (ref 0–0.04)
IMM GRANULOCYTES NFR BLD AUTO: 0.7 % (ref 0–0.5)
LYMPHOCYTES # BLD AUTO: 3 K/UL (ref 1–4.8)
LYMPHOCYTES NFR BLD: 21 % (ref 18–48)
MCH RBC QN AUTO: 31.5 PG (ref 27–31)
MCHC RBC AUTO-ENTMCNC: 33.6 G/DL (ref 32–36)
MCV RBC AUTO: 94 FL (ref 82–98)
MONOCYTES # BLD AUTO: 1.2 K/UL (ref 0.3–1)
MONOCYTES NFR BLD: 8.8 % (ref 4–15)
NEUTROPHILS # BLD AUTO: 9.4 K/UL (ref 1.8–7.7)
NEUTROPHILS NFR BLD: 66.5 % (ref 38–73)
NRBC BLD-RTO: 0 /100 WBC
PLATELET # BLD AUTO: 239 K/UL (ref 150–450)
PMV BLD AUTO: 9.3 FL (ref 9.2–12.9)
RBC # BLD AUTO: 3.91 M/UL (ref 4–5.4)
WBC # BLD AUTO: 14.11 K/UL (ref 3.9–12.7)

## 2022-06-30 PROCEDURE — 99024 POSTOP FOLLOW-UP VISIT: CPT | Mod: ,,, | Performed by: OBSTETRICS & GYNECOLOGY

## 2022-06-30 PROCEDURE — 11000001 HC ACUTE MED/SURG PRIVATE ROOM

## 2022-06-30 PROCEDURE — 25000003 PHARM REV CODE 250

## 2022-06-30 PROCEDURE — 36415 COLL VENOUS BLD VENIPUNCTURE: CPT

## 2022-06-30 PROCEDURE — 85025 COMPLETE CBC W/AUTO DIFF WBC: CPT

## 2022-06-30 PROCEDURE — 99024 PR POST-OP FOLLOW-UP VISIT: ICD-10-PCS | Mod: ,,, | Performed by: OBSTETRICS & GYNECOLOGY

## 2022-06-30 RX ADMIN — IBUPROFEN 600 MG: 600 TABLET ORAL at 06:06

## 2022-06-30 RX ADMIN — DOCUSATE SODIUM 200 MG: 100 CAPSULE, LIQUID FILLED ORAL at 01:06

## 2022-06-30 RX ADMIN — IBUPROFEN 600 MG: 600 TABLET ORAL at 01:06

## 2022-06-30 RX ADMIN — IBUPROFEN 600 MG: 600 TABLET ORAL at 04:06

## 2022-06-30 NOTE — LACTATION NOTE
Lactation visited the pt. Helped the pt  achieve a deep latch on the left breast. Baby taking good pulls and tugs with swallows noted. Pt using breast compression thru the feeding to keep the baby actively feeding. Pt to feed the baby 8 or more times in 24hrs on cue until content.    06/30/22 1300   Maternal Assessment   Breast Shape Bilateral:;round   Breast Density soft   Areola elastic   Nipples everted;short   Maternal Infant Feeding   Maternal Emotional State assist needed   Infant Positioning cross-cradle   Signs of Milk Transfer infant jaw motion present;audible swallow   Latch Assistance yes

## 2022-06-30 NOTE — ANESTHESIA POSTPROCEDURE EVALUATION
Anesthesia Post Evaluation    Patient: Patsy Rosenthal    Procedure(s) Performed: * No procedures listed *    Final Anesthesia Type: epidural      Patient location during evaluation: floor  Patient participation: Yes- Able to Participate  Level of consciousness: awake and alert  Post-procedure vital signs: reviewed and stable  Pain management: adequate  Airway patency: patent  JOYCE mitigation strategies: Use of major conduction anesthesia (spinal/epidural) or peripheral nerve block and Multimodal analgesia  PONV status at discharge: No PONV  Anesthetic complications: no      Cardiovascular status: blood pressure returned to baseline  Respiratory status: unassisted, spontaneous ventilation and room air  Hydration status: euvolemic  Follow-up not needed.          Vitals Value Taken Time   /63 06/30/22 0815   Temp 36.4 °C (97.5 °F) 06/30/22 0815   Pulse 67 06/30/22 0815   Resp 17 06/30/22 0815   SpO2 96 % 06/30/22 0815         No case tracking events are documented in the log.      Pain/Nicholas Score: Pain Rating Prior to Med Admin: 1 (6/30/2022  4:30 AM)  Pain Rating Post Med Admin: 0 (6/30/2022  5:30 AM)

## 2022-06-30 NOTE — PLAN OF CARE
Pt pain controlled with oral medications. Tolerating regular diet. Voiding spontaneously without difficulty and passing flatus. Fundus firm; midline; at umbilicus. Lochia moderate. S/S of post partum hemorrhage and preeclampsia reviewed pt verbalized understanding. Bonding appropriately with .  VSS.

## 2022-06-30 NOTE — PROGRESS NOTES
POSTPARTUM PROGRESS NOTE    Subjective:     PPD/POD#: 1   Procedure:    EGA: 39w4d   N/V: No   F/C: No   Abd Pain: Mild, well-controlled with oral pain medication   Lochia: Mild   Voiding: Yes   Ambulating: Yes   Bowel fnc: Yes   Breastfeeding: Yes   Contraception: Per primary OB   Circumcision: Patient declined circumcision     Objective:      Temp:  [97.5 °F (36.4 °C)-98.3 °F (36.8 °C)] 97.5 °F (36.4 °C)  Pulse:  [67-78] 67  Resp:  [16-18] 18  SpO2:  [94 %-96 %] 96 %  BP: (108-115)/(60-65) 108/65    Lung: Normal respiratory effort   Abdomen: Soft, appropriately tender   Uterus: Firm, no fundal tenderness   Incision: N/A   : Deferred   Extremities: No edema     Lab Review    Recent Labs   Lab 22  0629   *   K 3.9      CO2 20*   BUN 9   CREATININE 0.8   *   PROT 6.0   BILITOT 0.5   ALKPHOS 100   ALT 26   AST 32       Recent Labs   Lab 22  1718 22  0153 22  0154 22  0539   WBC 10.65  --   --  14.11*   HGB 13.6  --   --  12.3   HCT 40.1 51 55* 36.6*   MCV 93  --   --  94     --   --  239         I/O    Intake/Output Summary (Last 24 hours) at 2022 0631  Last data filed at 2022 1300  Gross per 24 hour   Intake --   Output 850 ml   Net -850 ml        Assessment and Plan:   1. Postpartum care:  - Patient doing well.  - Continue routine management and advances.    2. gHTN  - BP as above  - asymptomatic  - preE labs: AST/ALT 32/26, Cr 0.8, Plt 239  - UOP: adequate  - Mag: not indicated  - Hypertensive agent: not indicated    3. Obesity  - PrePreg BMI < 40  - Encourage ambulation  - Lovenox: not indicated     Claudia Hills MD  PGY-2 OB/GYN

## 2022-07-01 ENCOUNTER — PATIENT MESSAGE (OUTPATIENT)
Dept: OBSTETRICS AND GYNECOLOGY | Facility: CLINIC | Age: 32
End: 2022-07-01
Payer: COMMERCIAL

## 2022-07-01 VITALS
DIASTOLIC BLOOD PRESSURE: 75 MMHG | RESPIRATION RATE: 16 BRPM | TEMPERATURE: 98 F | HEART RATE: 65 BPM | OXYGEN SATURATION: 95 % | SYSTOLIC BLOOD PRESSURE: 134 MMHG

## 2022-07-01 PROCEDURE — 99024 POSTOP FOLLOW-UP VISIT: CPT | Mod: ,,, | Performed by: OBSTETRICS & GYNECOLOGY

## 2022-07-01 PROCEDURE — 25000003 PHARM REV CODE 250

## 2022-07-01 PROCEDURE — 99024 PR POST-OP FOLLOW-UP VISIT: ICD-10-PCS | Mod: ,,, | Performed by: OBSTETRICS & GYNECOLOGY

## 2022-07-01 RX ORDER — DOCUSATE SODIUM 100 MG/1
200 CAPSULE, LIQUID FILLED ORAL 2 TIMES DAILY PRN
Qty: 30 CAPSULE | Refills: 1 | Status: SHIPPED | OUTPATIENT
Start: 2022-07-01 | End: 2022-08-17

## 2022-07-01 RX ORDER — IBUPROFEN 600 MG/1
600 TABLET ORAL EVERY 6 HOURS PRN
Qty: 30 TABLET | Refills: 1 | Status: SHIPPED | OUTPATIENT
Start: 2022-07-01 | End: 2022-08-17

## 2022-07-01 RX ADMIN — IBUPROFEN 600 MG: 600 TABLET ORAL at 12:07

## 2022-07-01 NOTE — PROGRESS NOTES
Depression education given with handout, pt states understanding and will follow up with OB in the clinic for a mood check in 3 weeks

## 2022-07-01 NOTE — PLAN OF CARE
Pt discharged per OB's order. Pt voiding; passing flatus; pain controlled with oral medications. Fundus firm; midline; 2 cm below umbilicus. Bonding appropriately with . S/S of PPH and preeclampsia reviewed with understanding verbalized. Pt verbalized understanding to follow up with OB in 1 wk for mood check and 6 weeks for postpartum f/u. VSS.

## 2022-07-01 NOTE — PROGRESS NOTES
POSTPARTUM PROGRESS NOTE    Subjective:     PPD/POD#: 2   Procedure:    EGA: 39w4d   N/V: No   F/C: No   Abd Pain: Mild, well-controlled with oral pain medication   Lochia: Mild   Voiding: Yes   Ambulating: Yes   Bowel fnc: Yes   Breastfeeding: Yes   Contraception: Per primary OB   Circumcision: Patient declined circumcision     Objective:      Temp:  [97.5 °F (36.4 °C)-97.9 °F (36.6 °C)] 97.9 °F (36.6 °C)  Pulse:  [60-67] 60  Resp:  [16-17] 17  SpO2:  [95 %-96 %] 96 %  BP: (115-136)/(62-70) 132/70    Lung: Normal respiratory effort   Abdomen: Soft, appropriately tender   Uterus: Firm, no fundal tenderness   Incision: N/A   : Deferred   Extremities: No edema     Lab Review    Recent Labs   Lab 22  0629   *   K 3.9      CO2 20*   BUN 9   CREATININE 0.8   *   PROT 6.0   BILITOT 0.5   ALKPHOS 100   ALT 26   AST 32       Recent Labs   Lab 22  1718 22  0153 22  0154 22  0539   WBC 10.65  --   --  14.11*   HGB 13.6  --   --  12.3   HCT 40.1 51 55* 36.6*   MCV 93  --   --  94     --   --  239         I/O  No intake or output data in the 24 hours ending 22 0447     Assessment and Plan:   1. Postpartum care:  - Patient doing well.  - Continue routine management and advances.    2. gHTN  - BP as above  - asymptomatic  - preE labs: AST/ALT 32/, Cr 0.8, Plt 239  - UOP: adequate  - Mag: not indicated  - Hypertensive agent: not indicated    3. Obesity  - PrePreg BMI < 40  - Encourage ambulation  - Lovenox: not indicated     Claudia Hills MD  PGY-2 OB/GYN

## 2022-07-01 NOTE — DISCHARGE SUMMARY
Delivery Discharge Summary  Obstetrics      Primary OB Clinician: Roosevelt Duggan MD      Admission date: 2022  Discharge date: 2022    Disposition: To home, self care    Discharge Diagnosis List:      Patient Active Problem List   Diagnosis    Suspected fetal anomaly not found     (spontaneous vaginal delivery)    Gestational hypertension, third trimester       Procedure:     Hospital Course:  Patsy Rosenthal is a 31 y.o. now , PPD #2 who was admitted on 2022 at 39w3d for normal labor. Patient was subsequently admitted to labor and delivery unit with signed consents.     Labor course was complicated by the development of pre-eclampsia without severe features and resulted in  complicated by a shoulder dystocia.     Please see delivery note for further details. Her postpartum course was uncomplicated. On discharge day, patient's pain is controlled with oral pain medications. Pt is tolerating ambulation without SOB or CP, and regular diet without N/V. Reports lochia is mild. Denies any HA, vision changes, F/C, LE swelling. Denies any breast pain/soreness.     Her BP was controlled and did not require initiation of any anti-hypertensive medications.    Pt in stable condition and ready for discharge. She has been instructed to start and/or continue medications and follow up with her obstetrics provider as listed below.    Pertinent studies:  CBC  Recent Labs   Lab 22  1718 22  0153 22  0154 22  0539   WBC 10.65  --   --  14.11*   HGB 13.6  --   --  12.3   HCT 40.1 51 55* 36.6*   MCV 93  --   --  94     --   --  239          Immunization History   Administered Date(s) Administered    COVID-19, MRNA, LN-S, PF (MODERNA FULL 0.5 ML DOSE) 2021, 2021    DTaP 1992, 1993, 1993, 1994, 1997    HIB 1992, 1993, 1993, 1993    HPV Quadrivalent 2006, 2007, 2007    Hepatitis B,  Pediatric/Adolescent 1992, 1992, 1993    IPV 1992, 1993, 1994, 1997    Influenza 11/10/2021    Influenza - Quadrivalent - PF *Preferred* (6 months and older) 2018    Influenza - Trivalent (ADULT) 10/14/2009    MMR 1993, 1997    Meningococcal Conjugate (MCV4P) 2006, 2018    Tdap 2004, 2018, 2022    Varicella 11/10/1995, 10/14/2009        Delivery:    Episiotomy: None   Lacerations: None   Repair suture:     Repair # of packets: 2   Blood loss (ml):       Birth information:  YOB: 2020   Time of birth: 1:35 AM   Sex: male   Delivery type: Vaginal, Spontaneous   Gestational Age: 39w4d    Delivery Clinician:      Other providers:       Additional  information:  Forceps:    Vacuum:    Breech:    Observed anomalies      Living?:           APGARS  One minute Five minutes Ten minutes   Skin color:         Heart rate:         Grimace:         Muscle tone:         Breathing:         Totals: 8  9        Placenta: Delivered:       appearance      Patient Instructions:   Current Discharge Medication List      START taking these medications    Details   docusate sodium (COLACE) 100 MG capsule Take 2 capsules (200 mg total) by mouth 2 (two) times daily as needed for Constipation.  Qty: 30 capsule, Refills: 1      ibuprofen (ADVIL,MOTRIN) 600 MG tablet Take 1 tablet (600 mg total) by mouth every 6 (six) hours as needed for Pain.  Qty: 30 tablet, Refills: 1         CONTINUE these medications which have NOT CHANGED    Details   prenatal 25/iron fum/folic/dha (PRENATAL-1 ORAL) Take by mouth.             Discharge Procedure Orders   Lifting restrictions   Order Comments: Please do not lift anything heavier than your baby for 6 weeks if you've had a      Pelvic Rest   Order Comments: Nothing in the vagina for 6 weeks until evaluation by your OBGYN at your 6 week postpartum visit     Notify your health care provider if  you experience any of the following:  temperature >100.4     Notify your health care provider if you experience any of the following:  persistent nausea and vomiting or diarrhea     Notify your health care provider if you experience any of the following:  severe uncontrolled pain     Notify your health care provider if you experience any of the following:  redness, tenderness, or signs of infection (pain, swelling, redness, odor or green/yellow discharge around incision site)     Notify your health care provider if you experience any of the following:  difficulty breathing or increased cough     Notify your health care provider if you experience any of the following:  severe persistent headache     Notify your health care provider if you experience any of the following:  worsening rash     Notify your health care provider if you experience any of the following:  persistent dizziness, light-headedness, or visual disturbances     Notify your health care provider if you experience any of the following:  increased confusion or weakness     Notify your health care provider if you experience any of the following:   Order Comments: Vaginal bleeding that saturates >1 pad an hour for >1 hour     Activity as tolerated        Follow-up Information     Roosevelt Duggan MD Follow up in 1 week(s).    Specialties: Obstetrics, Obstetrics and Gynecology  Why: BP check  6 week postpartum check  Contact information:  [address] [address]  [phone]                          Claudia Hills MD  PGY-2 OB/GYN

## 2022-07-01 NOTE — LACTATION NOTE
Discharge lactation education provided. Questions answered. Pt has lactation contact number and community resources. Lc number on whiteboard for pt to call for breastfeeding assistance/assessment prior to discharge.

## 2022-07-05 ENCOUNTER — PATIENT MESSAGE (OUTPATIENT)
Dept: OBSTETRICS AND GYNECOLOGY | Facility: CLINIC | Age: 32
End: 2022-07-05
Payer: COMMERCIAL

## 2022-07-06 ENCOUNTER — TELEPHONE (OUTPATIENT)
Dept: OBSTETRICS AND GYNECOLOGY | Facility: CLINIC | Age: 32
End: 2022-07-06

## 2022-07-06 ENCOUNTER — POSTPARTUM VISIT (OUTPATIENT)
Dept: OBSTETRICS AND GYNECOLOGY | Facility: CLINIC | Age: 32
End: 2022-07-06
Payer: COMMERCIAL

## 2022-07-06 VITALS
SYSTOLIC BLOOD PRESSURE: 124 MMHG | WEIGHT: 222.44 LBS | BODY MASS INDEX: 35.75 KG/M2 | DIASTOLIC BLOOD PRESSURE: 86 MMHG | HEIGHT: 66 IN

## 2022-07-06 PROCEDURE — 99999 PR PBB SHADOW E&M-EST. PATIENT-LVL III: CPT | Mod: PBBFAC,,, | Performed by: OBSTETRICS & GYNECOLOGY

## 2022-07-06 PROCEDURE — 0503F POSTPARTUM CARE VISIT: CPT | Mod: S$GLB,,, | Performed by: OBSTETRICS & GYNECOLOGY

## 2022-07-06 PROCEDURE — 1111F DSCHRG MED/CURRENT MED MERGE: CPT | Mod: CPTII,S$GLB,, | Performed by: OBSTETRICS & GYNECOLOGY

## 2022-07-06 PROCEDURE — 99999 PR PBB SHADOW E&M-EST. PATIENT-LVL III: ICD-10-PCS | Mod: PBBFAC,,, | Performed by: OBSTETRICS & GYNECOLOGY

## 2022-07-06 PROCEDURE — 0503F PR POSTPARTUM CARE VISIT: ICD-10-PCS | Mod: S$GLB,,, | Performed by: OBSTETRICS & GYNECOLOGY

## 2022-07-06 PROCEDURE — 1111F PR DISCHARGE MEDS RECONCILED W/ CURRENT OUTPATIENT MED LIST: ICD-10-PCS | Mod: CPTII,S$GLB,, | Performed by: OBSTETRICS & GYNECOLOGY

## 2022-07-06 RX ORDER — SERTRALINE HYDROCHLORIDE 50 MG/1
50 TABLET, FILM COATED ORAL DAILY
Qty: 30 TABLET | Refills: 3 | Status: SHIPPED | OUTPATIENT
Start: 2022-07-06 | End: 2022-09-09 | Stop reason: SDUPTHER

## 2022-07-06 NOTE — PROGRESS NOTES
"CC: MOOD CHECK    HPI:  Patsy Rosenthal is a 31 y.o. female  who presents for a mood check.  She is S/P a  on 22.  Reports noting only mild perineal discomfort.  No fever.   No bowel / bladder complaints.  She is breast feeding.  Over the past several days, she has noted increased feelings of anxiety, depression, and mood swings.  Denies h / s ideations.  She has a history of depression prior to her pregnancy.  She would like to begin medication to help with mood as well as to receive counseling.  We reviewed Zoloft: r / b / correct usage / studies, etc.  She understands the need to contact us for worsening symptoms.      Delivery Date: 22  Delivery MD: Dr. Sullivan      /86   Ht 5' 6" (1.676 m)   Wt 100.9 kg (222 lb 7.1 oz)   LMP 2021 (Exact Date)   Breastfeeding Yes   BMI 35.90 kg/m²       IMP:  One week S/P   PP anxiety / depression    Rx Zoloft 50 mg    PLAN:  Return: 2 weeks for PP check  We will contact her with list of resources for counseling    I spent a total of 15 minutes on the day of the visit.This includes face to face time and non-face to face time preparing to see the patient (eg, review of tests), Obtaining and/or reviewing separately obtained history, Documenting clinical information in the electronic or other health record, Independently interpreting resultsand communicating results to the patient/family/caregiver, or Care coordination.      "

## 2022-07-21 ENCOUNTER — POSTPARTUM VISIT (OUTPATIENT)
Dept: OBSTETRICS AND GYNECOLOGY | Facility: CLINIC | Age: 32
End: 2022-07-21
Attending: OBSTETRICS & GYNECOLOGY
Payer: COMMERCIAL

## 2022-07-21 VITALS
DIASTOLIC BLOOD PRESSURE: 64 MMHG | BODY MASS INDEX: 33.94 KG/M2 | WEIGHT: 211.19 LBS | SYSTOLIC BLOOD PRESSURE: 100 MMHG | HEIGHT: 66 IN

## 2022-07-21 PROCEDURE — 99999 PR PBB SHADOW E&M-EST. PATIENT-LVL III: CPT | Mod: PBBFAC,,, | Performed by: OBSTETRICS & GYNECOLOGY

## 2022-07-21 PROCEDURE — 99999 PR PBB SHADOW E&M-EST. PATIENT-LVL III: ICD-10-PCS | Mod: PBBFAC,,, | Performed by: OBSTETRICS & GYNECOLOGY

## 2022-07-21 PROCEDURE — 0503F POSTPARTUM CARE VISIT: CPT | Mod: CPTII,S$GLB,, | Performed by: OBSTETRICS & GYNECOLOGY

## 2022-07-21 PROCEDURE — 0503F PR POSTPARTUM CARE VISIT: ICD-10-PCS | Mod: CPTII,S$GLB,, | Performed by: OBSTETRICS & GYNECOLOGY

## 2022-07-21 NOTE — PROGRESS NOTES
"CC: PP CHECK    HPI:  Patsy Rosenthal is a 31 y.o. female  who presents for a PP mood check.  She is S/P a  on 22.  She had been seen in the office 22 at which time she was experiencing anxiety, mood swings, and depression for which she was started on Zoloft.  Now, she feels much better with significant improvement in her symptoms.  No bowel / bladder complaints.  Only minimal perineal discomfort with certain movement.  She is breast feeding.    Delivery Date: 22  Delivery MD: Dr. Sullivan      /64   Ht 5' 6" (1.676 m)   Wt 95.8 kg (211 lb 3.2 oz)   LMP 2021 (Exact Date)   Breastfeeding Yes   BMI 34.09 kg/m²       IMP:  Doing well 3 S/P   Mood significantly improved on Zoloft  Instructions / precautions reviewed    PLAN:  Return: 3 weeks for PP check        "

## 2022-08-11 ENCOUNTER — POSTPARTUM VISIT (OUTPATIENT)
Dept: OBSTETRICS AND GYNECOLOGY | Facility: CLINIC | Age: 32
End: 2022-08-11
Attending: OBSTETRICS & GYNECOLOGY
Payer: COMMERCIAL

## 2022-08-11 VITALS
BODY MASS INDEX: 34.08 KG/M2 | HEIGHT: 66 IN | WEIGHT: 212.06 LBS | DIASTOLIC BLOOD PRESSURE: 60 MMHG | SYSTOLIC BLOOD PRESSURE: 98 MMHG

## 2022-08-11 DIAGNOSIS — Z30.014 ENCOUNTER FOR INITIAL PRESCRIPTION OF INTRAUTERINE CONTRACEPTIVE DEVICE (IUD): ICD-10-CM

## 2022-08-11 PROCEDURE — 99999 PR PBB SHADOW E&M-EST. PATIENT-LVL III: CPT | Mod: PBBFAC,,, | Performed by: OBSTETRICS & GYNECOLOGY

## 2022-08-11 PROCEDURE — 0503F PR POSTPARTUM CARE VISIT: ICD-10-PCS | Mod: CPTII,S$GLB,, | Performed by: OBSTETRICS & GYNECOLOGY

## 2022-08-11 PROCEDURE — 99999 PR PBB SHADOW E&M-EST. PATIENT-LVL III: ICD-10-PCS | Mod: PBBFAC,,, | Performed by: OBSTETRICS & GYNECOLOGY

## 2022-08-11 PROCEDURE — 0503F POSTPARTUM CARE VISIT: CPT | Mod: CPTII,S$GLB,, | Performed by: OBSTETRICS & GYNECOLOGY

## 2022-08-11 NOTE — PROGRESS NOTES
"CC: Post-partum follow-up    Patsy Rosenthal is a 31 y.o. female  who presents for post-partum visit.  She is 6 weeks S/P a  on 22.  She and the baby are doing well.  No pain.  No fever.   No bowel / bladder complaints.  She is breast and bottle feeding.  We reviewed contraceptive options and she desires the Mirena IUD.  We discussed Mirena: r / b / correct usage / expected bleeding patterns, etc. She reports that her mood has significantly improved with Zoloft and she is now essentially at her baseline- denies anxiety / depression.    Delivery Date: 22  Delivery MD: Dr. Sullivan  Gender: male  Breast Feeding: YES  Depression: Previously, now doing well on Zoloft.  Contraception: IUD- Mirena    10/26/21 Pap: Negative    BP 98/60   Ht 5' 6" (1.676 m)   Wt 96.2 kg (212 lb 1.3 oz)   LMP 2021 (Exact Date)   Breastfeeding Yes   BMI 34.23 kg/m²     ROS:  GENERAL: No fever, chills, fatigability.  VULVAR: No pain, no lesions and no itching.  VAGINAL: No relaxation, no itching, no discharge, no abnormal bleeding and no lesions.  ABDOMEN: No abdominal pain. Denies nausea. Denies vomiting. No diarrhea. No constipation  BREAST: Denies pain. No lumps.  URINARY: No incontinence, no nocturia, no frequency and no dysuria.  CARDIOVASCULAR: No chest pain. No shortness of breath. No leg cramps.  NEUROLOGICAL: No headaches. No vision changes.    PHYSICAL EXAM:  Exam chaperoned by nurse  ABDOMEN:  Soft, non-tender, non-distended  VULVA:  Normal, no lesions  CERVIX:  Without lesions, polyps or tenderness.  UTERUS:  Normal size, shape, consistency, no mass or tenderness.  ADNEXA:  Normal in size without mass or tenderness    IMP:  Doing well 6 weeks S/P   Instructions / precautions reviewed  Contraceptive counseling    Rx Mirena    PLAN:  May resume normal activities  Return for Mirena IUD insertion        "

## 2022-08-17 ENCOUNTER — OFFICE VISIT (OUTPATIENT)
Dept: PSYCHIATRY | Facility: CLINIC | Age: 32
End: 2022-08-17
Payer: COMMERCIAL

## 2022-08-17 VITALS
TEMPERATURE: 97 F | SYSTOLIC BLOOD PRESSURE: 112 MMHG | HEART RATE: 68 BPM | WEIGHT: 212.94 LBS | BODY MASS INDEX: 34.37 KG/M2 | DIASTOLIC BLOOD PRESSURE: 66 MMHG

## 2022-08-17 DIAGNOSIS — Z86.59 HISTORY OF ATTENTION DEFICIT HYPERACTIVITY DISORDER (ADHD): ICD-10-CM

## 2022-08-17 DIAGNOSIS — F41.8 POSTPARTUM ANXIETY: Primary | ICD-10-CM

## 2022-08-17 PROCEDURE — 3008F PR BODY MASS INDEX (BMI) DOCUMENTED: ICD-10-PCS | Mod: CPTII,S$GLB,, | Performed by: PSYCHIATRY & NEUROLOGY

## 2022-08-17 PROCEDURE — 1159F MED LIST DOCD IN RCRD: CPT | Mod: CPTII,S$GLB,, | Performed by: PSYCHIATRY & NEUROLOGY

## 2022-08-17 PROCEDURE — 3078F DIAST BP <80 MM HG: CPT | Mod: CPTII,S$GLB,, | Performed by: PSYCHIATRY & NEUROLOGY

## 2022-08-17 PROCEDURE — 99999 PR PBB SHADOW E&M-EST. PATIENT-LVL III: ICD-10-PCS | Mod: PBBFAC,,, | Performed by: PSYCHIATRY & NEUROLOGY

## 2022-08-17 PROCEDURE — 90792 PSYCH DIAG EVAL W/MED SRVCS: CPT | Mod: S$GLB,,, | Performed by: PSYCHIATRY & NEUROLOGY

## 2022-08-17 PROCEDURE — 1159F PR MEDICATION LIST DOCUMENTED IN MEDICAL RECORD: ICD-10-PCS | Mod: CPTII,S$GLB,, | Performed by: PSYCHIATRY & NEUROLOGY

## 2022-08-17 PROCEDURE — 99999 PR PBB SHADOW E&M-EST. PATIENT-LVL III: CPT | Mod: PBBFAC,,, | Performed by: PSYCHIATRY & NEUROLOGY

## 2022-08-17 PROCEDURE — 90792 PR PSYCHIATRIC DIAGNOSTIC EVALUATION W/MEDICAL SERVICES: ICD-10-PCS | Mod: S$GLB,,, | Performed by: PSYCHIATRY & NEUROLOGY

## 2022-08-17 PROCEDURE — 3008F BODY MASS INDEX DOCD: CPT | Mod: CPTII,S$GLB,, | Performed by: PSYCHIATRY & NEUROLOGY

## 2022-08-17 PROCEDURE — 3078F PR MOST RECENT DIASTOLIC BLOOD PRESSURE < 80 MM HG: ICD-10-PCS | Mod: CPTII,S$GLB,, | Performed by: PSYCHIATRY & NEUROLOGY

## 2022-08-17 PROCEDURE — 1160F RVW MEDS BY RX/DR IN RCRD: CPT | Mod: CPTII,S$GLB,, | Performed by: PSYCHIATRY & NEUROLOGY

## 2022-08-17 PROCEDURE — 3074F SYST BP LT 130 MM HG: CPT | Mod: CPTII,S$GLB,, | Performed by: PSYCHIATRY & NEUROLOGY

## 2022-08-17 PROCEDURE — 3074F PR MOST RECENT SYSTOLIC BLOOD PRESSURE < 130 MM HG: ICD-10-PCS | Mod: CPTII,S$GLB,, | Performed by: PSYCHIATRY & NEUROLOGY

## 2022-08-17 PROCEDURE — 1160F PR REVIEW ALL MEDS BY PRESCRIBER/CLIN PHARMACIST DOCUMENTED: ICD-10-PCS | Mod: CPTII,S$GLB,, | Performed by: PSYCHIATRY & NEUROLOGY

## 2022-08-17 NOTE — PROGRESS NOTES
"PSYCHIATRIC EVALUATION    Name: Patsy Rosenthal  Age: 31 y.o. 1990  Date of Encounter: 8/17/2022    Sources of Information:  Patient    Chief Complaint:  Postpartum anxiety, ADHD    History of Present Illness   Ms. Rosenthal is a 31 y.o. year old woman with a psychiatric history of depression and ADHD who presents to the clinic for postpartum anxiety. Zoloft was started two weeks postpartum for anxiety. She was on adderall for ADHD prior to her pregnancy. She plans to breastfeed until the baby is 6-9 months.    In 2014, patient says, "I was having problems with every aspect of my life. My mental health journey really started" and she started therapy. She was in her early 20s, had just graduated college, and moved back home. She was having trouble with Koko and hated her job.     She had increased anxiety after giving birth 7 weeks ago. She has had some intrusive thoughts about stories she has read about children growing up in violent or neglectful homes. She is terrified of her  leaving for work. She denies social anxiety. She used to enjoy powerlifting. She has panic attacks about 1-2 times per year.    She tried adderall in college. She went from academic probation to making As and Bs. She diagnosed with ADHD by Dr. Alison Ryan. She was taking 20mg IR BID during computer classes at [employer]. She also received disability services and extended time. She wants to start an online program through [employer] for software engineering in Spring or Fall 2023, so that she can get a better paying job with more flexibility. She is interested in [email or web address]. She has some brain fog leftover from COVID. On Adderall, "the brain fog is lifted. The mental jake to do things is gone. My brain is more calm."    Patient denies SI, HI, AVH.    Measures  PHQ9 Score:  12/27  GAD7 Score:   3/21    Prescription Monitoring Program  Reviewed. Controlled prescriptions found. Last filled adderall 20mg in October 2021.    Psychiatric " "History  Diagnoses:     ADHD, PPD  Inpatient:        No  Outpatient:        Pyschiatrists: Dr. Suma Jansen from [id number], Dr. Alison Ryan [phone]. Therapy: Dr. Morris in   Medication Trials:      Tried wellbutrin in , stopped due to migraines.  Self-Harm:       No  Suicide Attempts:     No     Substance Use History  Tobacco:       No  Alcohol:       Currently drinks 1-2 drinks in one sitting. Estimates drinking 3 days in the past month.  Caffeine:    Yes  Recreational Drugs:      Marijuana and adderall  Previous CD Treatment:    No    Medical History  Past Medical History:   Diagnosis Date    Depression        PCP:     No  Head Injury    No  Seizure    No    Surgical History  Past Surgical History:   Procedure Laterality Date    wisdom tooth removal         Current Medications  prenatal 25/iron fum/folic/dha and sertraline    Allergies: Patient has no known allergies.    Family Psychiatric History  Suicide attempts:     Denies  Substance abuse:     Denies  Diagnoses:      Denies  Sudden cardiac death before 51yo: Denies    Social History  Born:      Born in [place].   Childhood:      Raised in [place] by parents. Describes childhood as "happy and also challenging."   Relationships:   to Koko  Children:    son Sawyer is 7 weeks old  Living situation:    house in [place] with  son, , sister Gena, Gena's boyfriend Gilson, 2 dogs  Education History:   Highest level of schooling is  currently in grad school . Special Ed:  yes . Repeated grades: No. Suspensions: No.  Work History:     currently employed at [employer], 5 years . Koko is an   Legal History:     At 17yo for shoplifting  Firearms Access:   Denies      Psychiatric Review of Systems  Amina: Denies periods with decreased need for sleep, elated mood, increased energy.  Psychosis: Denies auditory/visual hallucinations, paranoia, and delusions.    OCD: Denies obsessions and " "compulsions.  PTSD: + trauma Denies recurrent intrusive memories, flashbacks, or nightmares.    ADHD: had ADHD symptoms in childhood  Eating Disorders: Denies history of restricting, binging, purging behavior.    Medical Review of Systems  Pertinent items are noted in HPI.    PHYSICAL EXAM:  Vitals: Blood pressure 112/66, pulse 68, temperature 97.3 °F (36.3 °C), temperature source Temporal, weight 96.6 kg (212 lb 15.4 oz), currently breastfeeding.    Labs:   Lab Results   Component Value Date    TSH 2.030 03/22/2018    HGB 12.3 06/30/2022    HCT 36.6 (L) 06/30/2022    AST 32 06/29/2022    ALT 26 06/29/2022    [id number] 33 07/30/2019        RPR (no units)   Date Value   06/28/2022 Non-reactive     Hepatitis B Surface Ag (no units)   Date Value   11/19/2021 Negative     Hep B S Ab (no units)   Date Value   05/16/2012 Negative     HIV 1/2 Ag/Ab (no units)   Date Value   06/28/2022 Negative     No head imaging      MENTAL STATUS EXAM  General:  Alert and oriented x 4 Appearance is good grooming and hygiene, appears stated age, Body mass index is 34.37 kg/m². . Behavior: friendly and cooperative, eye contact normal.  Gait, Posture and Muscle Strength/Tone: Normal gait and posture observed while watching patient walk to exam room. No gross abnormal movements noted.  Psychomotor Agitation and Retardation: none evident  Speech: Normal rate, volume, articulation, and tone.  Mood: "good"  Affect: full  Thought Process: Linear and coherent. No flight of ideas.  Associations:  Intact. No loosening of associations.  Thought content: Denies suicidal and homicidal thoughts, plans and intentions.  Perceptions: Denies any auditory or visual hallucinations. Does not appear internally preoccupied.  Orientation: Alert and oriented to person, place, date and situation  Attention and Concentration: Intact.   Memory: Intact grossly   Language: No deficits noted   Fund of Knowledge: appropriate for age and level and education. "   Insight:   good  Judgment: good  Impulse control: good    SUMMARY  Ms. Rosenthal is a 31 y.o. year old woman with a psychiatric history of depression and ADHD who presents to the clinic for postpartum anxiety. Zoloft was started two weeks postpartum for anxiety. She was on adderall for ADHD prior to her pregnancy. She plans to breastfeed until the baby is 6-9 months. She is not in a rush to start stimulant medication as she only plans to restart school in the spring. While stimulant medication is not completely contraindicated in breastfeeding, it is recommended to avoid or at least to delay if functional impairment from ADHD is not severe. ASRS next visit    DIAGNOSTIC IMPRESSION   Problem List Items Addressed This Visit          Psychiatric    Postpartum anxiety - Primary    History of attention deficit hyperactivity disorder (ADHD)         PLAN  Patient Instructions   Continue Zoloft 50mg daily. Switch to mornings.    Follow up in 3 weeks.    Candi Bruner  Protestant - PSYCHIATRY    Today's encounter took a total time of 60 minutes, and that time included patient interview, documentation, .    Risks, Benefits, Side Effects and Alternatives were discussed with the patient today and consent was obtained for the current medication regimen. The patient was encouraged to ask questions and these questions were answered and the patient was engaged in psychoeducation regarding diagnosis, course of illness, accuracy of the diagnosis, and other general elements regarding overall diagnosis and treatment consideration.     Any medications being used off-label were discussed with the patient inclusive of the evidence base for the use of the medications and consent was obtained for the off-label use of the medication.     Brief suicide risk assessment was performed with the patient today and safety planning was performed if indicated.    Note completed by: Candi Bruner MD, 9/5/2022 10:37 AM

## 2022-08-25 ENCOUNTER — PATIENT MESSAGE (OUTPATIENT)
Dept: OBSTETRICS AND GYNECOLOGY | Facility: CLINIC | Age: 32
End: 2022-08-25
Payer: COMMERCIAL

## 2022-09-05 PROBLEM — F41.8 POSTPARTUM ANXIETY: Status: ACTIVE | Noted: 2022-09-05

## 2022-09-05 PROBLEM — Z86.59 HISTORY OF ATTENTION DEFICIT HYPERACTIVITY DISORDER (ADHD): Status: ACTIVE | Noted: 2022-09-05

## 2022-09-07 ENCOUNTER — PATIENT MESSAGE (OUTPATIENT)
Dept: PSYCHIATRY | Facility: CLINIC | Age: 32
End: 2022-09-07
Payer: COMMERCIAL

## 2022-09-09 ENCOUNTER — OFFICE VISIT (OUTPATIENT)
Dept: PSYCHIATRY | Facility: CLINIC | Age: 32
End: 2022-09-09
Payer: COMMERCIAL

## 2022-09-09 VITALS — WEIGHT: 209 LBS | BODY MASS INDEX: 33.73 KG/M2

## 2022-09-09 DIAGNOSIS — Z86.59 HISTORY OF ATTENTION DEFICIT HYPERACTIVITY DISORDER (ADHD): ICD-10-CM

## 2022-09-09 DIAGNOSIS — F41.8 POSTPARTUM ANXIETY: Primary | ICD-10-CM

## 2022-09-09 PROCEDURE — 1160F RVW MEDS BY RX/DR IN RCRD: CPT | Mod: CPTII,95,, | Performed by: PSYCHIATRY & NEUROLOGY

## 2022-09-09 PROCEDURE — 99214 OFFICE O/P EST MOD 30 MIN: CPT | Mod: 95,,, | Performed by: PSYCHIATRY & NEUROLOGY

## 2022-09-09 PROCEDURE — 90833 PR PSYCHOTHERAPY W/PATIENT W/E&M, 30 MIN (ADD ON): ICD-10-PCS | Mod: 95,,, | Performed by: PSYCHIATRY & NEUROLOGY

## 2022-09-09 PROCEDURE — 90833 PSYTX W PT W E/M 30 MIN: CPT | Mod: 95,,, | Performed by: PSYCHIATRY & NEUROLOGY

## 2022-09-09 PROCEDURE — 1159F PR MEDICATION LIST DOCUMENTED IN MEDICAL RECORD: ICD-10-PCS | Mod: CPTII,95,, | Performed by: PSYCHIATRY & NEUROLOGY

## 2022-09-09 PROCEDURE — 1159F MED LIST DOCD IN RCRD: CPT | Mod: CPTII,95,, | Performed by: PSYCHIATRY & NEUROLOGY

## 2022-09-09 PROCEDURE — 1160F PR REVIEW ALL MEDS BY PRESCRIBER/CLIN PHARMACIST DOCUMENTED: ICD-10-PCS | Mod: CPTII,95,, | Performed by: PSYCHIATRY & NEUROLOGY

## 2022-09-09 PROCEDURE — 99214 PR OFFICE/OUTPT VISIT, EST, LEVL IV, 30-39 MIN: ICD-10-PCS | Mod: 95,,, | Performed by: PSYCHIATRY & NEUROLOGY

## 2022-09-09 NOTE — PROGRESS NOTES
"Subsequent Psychiatric Session-VIRTUAL    32 y.o. female    Reason for Follow Up:   1. Postpartum anxiety    2. History of attention deficit hyperactivity disorder (ADHD)          Current Medications:    Current Outpatient Medications:     prenatal 25/iron fum/folic/dha (PRENATAL-1 ORAL), Take by mouth., Disp: , Rfl:     sertraline (ZOLOFT) 50 MG tablet, Take 1 tablet (50 mg total) by mouth once daily., Disp: 30 tablet, Rfl: 3     Date of Last Visit:   8/17/2022    In Clinic Since:   August 2022  Therapy:    None currently    Interval History  Her  has been feeling depressed. His father's cancer came back. This is the one year anniversary of him being fired from his job after Hurricane [place]. The baby has not been sleeping. Her parents have the baby today so that she could go to the gym, attend the appointment, have a girls' night. She is having trouble connecting with Koko.    She started Adderall with Dr. Jansen to help depressive symptoms. She says of adderall: "It helped me do things to help me alleviate depression and had minimal side effects." When she last competed, she had to be taken off of Adderall for the urine drug screen. Since the third grade, she has only been able to read things that interest her, even with Adderall. Her mind goes elsewhere and has to reread paragraphs. She will get sleepy sometimes while reading, particularly when she was taking grad school classes at night.    Denies medication side effects. Denies SI, HI, AVH.    Relationships:            to Koko  Children:                    infant son Sawyer    PSYCHOTHERAPY ADD-ON +[id number] 30 minutes (range 16-37 minutes)  Therapeutic intervention type: supportive psychotherapy  Why chosen therapy is appropriate versus another modality: patient responds to this modality  Target symptoms addressed:  postpartum stress , relationship problems  Topics and themes discussed: postpartum stress, relationship problems  Primary focus: " postpartum stress, relationship problems  Psychotherapeutic techniques employed: active listening and empathic responses  Outcome monitoring methods: self-report  The patient's response to the intervention is: accepting.   The patient's progress toward treatment goals is: fair.  Duration of intervention: 16 minutes     Manic/Hypomanic Symptom Screening:  Since the last session, have you had any periods lasting at least a few days where your energy level was higher than normal and you did not need as much sleep at night to function the following day?: No    Substance Use Screening:  Not asked this session    Review of Measures  PHQ-9: 8  BILL-7: 1    Scores from last session:   PHQ9 Score:              12/27  GAD7 Score:              3/21    Review of PDMP  Reviewed this session. Adderall ER 20mg x 60 filled from October 2020 to October 2021. Prescribed By Dr. Dunn-Lombard    Constitutional     VITAL SIGNS  Temp     BP      HR      RR      SpO2             MENTAL STATUS EXAM  General:  Alert and oriented x 4 Appearance is good grooming and hygiene, appears stated age, Body mass index is 33.41kg/m². Behavior: friendly and cooperative, eye contact normal.  Gait, Posture and Muscle Strength/Tone: Normal posture observed while watching patient walk to exam room. No gross abnormal movements noted.  Psychomotor Agitation and Retardation: none evident  Speech: Normal rate, volume, articulation, and tone.  Mood: denies depression  Affect: full  Thought Process: Linear and coherent. No flight of ideas.  Associations:  Intact. No loosening of associations.  Thought content: Denies suicidal and homicidal thoughts, plans and intentions.  Perceptions: Denies any auditory or visual hallucinations. Does not appear internally preoccupied.  Orientation: Alert and oriented to person, place, date and situation  Attention and Concentration: Intact.   Memory: Intact grossly   Language: No deficits noted   Fund of Knowledge: appropriate  for age and level and education.   Insight:   good  Judgment: good  Impulse control: good      ASSESSMENT  Problem List Items Addressed This Visit          Psychiatric    Postpartum anxiety - Primary    Relevant Medications    sertraline (ZOLOFT) 50 MG tablet    History of attention deficit hyperactivity disorder (ADHD)          PLAN  Patient Instructions   Continue Zoloft 50mg daily      -------------------------------------------------------------------------------    Candi Bruner  Catholic - PSYCHIATRY    Today's encounter took a total time of 60 minutes, and that time included 30 minutes of psychotherapy.    Due to the pandemic, today's session was performed over [facility]. Patient is confirmed to be in the [facility]. The participants are patient and myself.     Risks, Benefits, Side Effects and Alternatives were discussed with the patient today and consent was obtained for the current medication regimen. The patient was encouraged to ask questions and these questions were answered and the patient was engaged in psychoeducation regarding diagnosis, course of illness, accuracy of the diagnosis, and other general elements regarding overall diagnosis and treatment consideration.     Any medications being used off-label were discussed with the patient inclusive of the evidence base for the use of the medications and consent was obtained for the off-label use of the medication.     Brief suicide risk assessment was performed with the patient today and safety planning was performed if indicated.    Note completed by: Candi Bruner MD, 10/16/2022 1:56 PM

## 2022-10-03 PROBLEM — O13.3 GESTATIONAL HYPERTENSION, THIRD TRIMESTER: Status: RESOLVED | Noted: 2022-06-29 | Resolved: 2022-10-03

## 2022-10-06 ENCOUNTER — PATIENT MESSAGE (OUTPATIENT)
Dept: PSYCHIATRY | Facility: CLINIC | Age: 32
End: 2022-10-06
Payer: COMMERCIAL

## 2022-10-10 ENCOUNTER — OFFICE VISIT (OUTPATIENT)
Dept: PSYCHIATRY | Facility: CLINIC | Age: 32
End: 2022-10-10
Payer: COMMERCIAL

## 2022-10-10 VITALS
BODY MASS INDEX: 33.41 KG/M2 | DIASTOLIC BLOOD PRESSURE: 65 MMHG | SYSTOLIC BLOOD PRESSURE: 102 MMHG | WEIGHT: 207 LBS | HEART RATE: 79 BPM

## 2022-10-10 DIAGNOSIS — F41.8 POSTPARTUM ANXIETY: Primary | ICD-10-CM

## 2022-10-10 PROCEDURE — 3074F PR MOST RECENT SYSTOLIC BLOOD PRESSURE < 130 MM HG: ICD-10-PCS | Mod: CPTII,95,S$GLB, | Performed by: PSYCHIATRY & NEUROLOGY

## 2022-10-10 PROCEDURE — 3078F DIAST BP <80 MM HG: CPT | Mod: CPTII,95,S$GLB, | Performed by: PSYCHIATRY & NEUROLOGY

## 2022-10-10 PROCEDURE — 99214 OFFICE O/P EST MOD 30 MIN: CPT | Mod: 95,S$GLB,, | Performed by: PSYCHIATRY & NEUROLOGY

## 2022-10-10 PROCEDURE — 99999 PR PBB SHADOW E&M-EST. PATIENT-LVL III: ICD-10-PCS | Mod: PBBFAC,,, | Performed by: PSYCHIATRY & NEUROLOGY

## 2022-10-10 PROCEDURE — 3074F SYST BP LT 130 MM HG: CPT | Mod: CPTII,95,S$GLB, | Performed by: PSYCHIATRY & NEUROLOGY

## 2022-10-10 PROCEDURE — 3078F PR MOST RECENT DIASTOLIC BLOOD PRESSURE < 80 MM HG: ICD-10-PCS | Mod: CPTII,95,S$GLB, | Performed by: PSYCHIATRY & NEUROLOGY

## 2022-10-10 PROCEDURE — 99214 PR OFFICE/OUTPT VISIT, EST, LEVL IV, 30-39 MIN: ICD-10-PCS | Mod: 95,S$GLB,, | Performed by: PSYCHIATRY & NEUROLOGY

## 2022-10-10 PROCEDURE — 99999 PR PBB SHADOW E&M-EST. PATIENT-LVL III: CPT | Mod: PBBFAC,,, | Performed by: PSYCHIATRY & NEUROLOGY

## 2022-10-10 RX ORDER — SERTRALINE HYDROCHLORIDE 50 MG/1
50 TABLET, FILM COATED ORAL DAILY
Qty: 90 TABLET | Refills: 0 | Status: CANCELLED | OUTPATIENT
Start: 2022-10-10

## 2022-10-10 NOTE — PROGRESS NOTES
"Subsequent Psychiatric Session-VIRTUAL    32 y.o. female    Reason for Follow Up:   1. Postpartum anxiety          Current Medications:    Current Outpatient Medications:     prenatal 25/iron fum/folic/dha (PRENATAL-1 ORAL), Take by mouth., Disp: , Rfl:     sertraline (ZOLOFT) 50 MG tablet, Take 1 tablet (50 mg total) by mouth once daily., Disp: 30 tablet, Rfl: 3     Date of Last Visit:   8/17/2022    In Clinic Since:   August 2022  Therapy:    None currently    Interval History  She is "trying to be gracious" with herself. Yesterday, she went on a long walk with the baby. She had reoccurring thoughts of "I'm exhausted. This is a lot." She is thinking a lot about how she is not enjoying things. There's a lot of negative self talk. She is worried "it could spiral into everything is pointless, hopeless." Her application deadline is in December. She is thinking about starting school in the spring. Her goal for breastfeeding is nine months. She had migraines with Wellbutrin in the past. Her  is doing "relatively" better. He injured his back. Their relationship is improved. She is turning 32 next week, and they have a wedding anniversary.    She denies SI, HI, AVH, twin. She has an occasional beer.    Sawyer is 14 weeks old.    Denies medication side effects. Denies SI, HI, AVH.    Relationships:            to Koko  Children:                    infant son Sawyer    Substance Use Screening:  Not asked this session    Review of Measures  PHQ-9:  7  BILL-7: 0    Scores from last session:   PHQ-9: 8  BILL-7: 1    PHQ9 Score:              12/27  GAD7 Score:              3/21    Review of PDMP  Reviewed this session. Adderall ER 20mg x 60 filled from October 2020 to October 2021. Prescribed By Dr. Dunn-Lombard    Constitutional     VITAL SIGNS  Temp     /65    HR 79    RR      SpO2             MENTAL STATUS EXAM  General:  Alert and oriented x 4 Appearance is good grooming and hygiene, appears stated age, " Body mass index is 33.41kg/m². Behavior: friendly and cooperative, eye contact normal.  Gait, Posture and Muscle Strength/Tone: Normal posture observed while watching patient walk to exam room. No gross abnormal movements noted.  Psychomotor Agitation and Retardation: none evident  Speech: Normal rate, volume, articulation, and tone.  Mood: denies depression  Affect: full  Thought Process: Linear and coherent. No flight of ideas.  Associations:  Intact. No loosening of associations.  Thought content: Denies suicidal and homicidal thoughts, plans and intentions.  Perceptions: Denies any auditory or visual hallucinations. Does not appear internally preoccupied.  Orientation: Alert and oriented to person, place, date and situation  Attention and Concentration: Intact.   Memory: Intact grossly   Language: No deficits noted   Fund of Knowledge: appropriate for age and level and education.   Insight:   good  Judgment: good  Impulse control: good      ASSESSMENT  Problem List Items Addressed This Visit          Psychiatric    Postpartum anxiety - Primary          PLAN  Patient Instructions   Continue Zoloft 50mg daily    Follow up in 4 weeks.      -------------------------------------------------------------------------------    Candi LOMBARDO - PSYCHIATRY    Today's encounter took a total time of 60 minutes, and that time included 30 minutes of psychotherapy.    Due to the pandemic, today's session was performed over [facility]. Patient is confirmed to be in the [facility]. The participants are patient and myself.     Risks, Benefits, Side Effects and Alternatives were discussed with the patient today and consent was obtained for the current medication regimen. The patient was encouraged to ask questions and these questions were answered and the patient was engaged in psychoeducation regarding diagnosis, course of illness, accuracy of the diagnosis, and other general elements regarding overall diagnosis and  treatment consideration.     Any medications being used off-label were discussed with the patient inclusive of the evidence base for the use of the medications and consent was obtained for the off-label use of the medication.     Brief suicide risk assessment was performed with the patient today and safety planning was performed if indicated.    Note completed by: Candi Bruner MD, 10/16/2022 1:56 PM

## 2022-10-16 RX ORDER — SERTRALINE HYDROCHLORIDE 50 MG/1
50 TABLET, FILM COATED ORAL DAILY
Qty: 30 TABLET | Refills: 3 | Status: SHIPPED | OUTPATIENT
Start: 2022-10-16 | End: 2022-12-29

## 2022-12-02 ENCOUNTER — PATIENT MESSAGE (OUTPATIENT)
Dept: OBSTETRICS AND GYNECOLOGY | Facility: CLINIC | Age: 32
End: 2022-12-02
Payer: COMMERCIAL

## 2022-12-27 NOTE — PROGRESS NOTES
"Subsequent Psychiatric Session    32 y.o. female    Reason for Follow Up:   1. ADHD (attention deficit hyperactivity disorder), combined type    2. Postpartum anxiety in remission        Current Medications:    Current Outpatient Medications:     methylphenidate HCl 36 MG CR tablet, Take 1 tablet (36 mg total) by mouth every morning., Disp: 30 tablet, Rfl: 0    prenatal 25/iron fum/folic/dha (PRENATAL-1 ORAL), Take by mouth., Disp: , Rfl:      Date of Last Visit:   10/10/2022    In Clinic Since:   August 2022  Therapy:    None currently    Interval History  She is readmitted into school. Her work has been suffering due to inattention. She wants to restart adderall or try Concerta. Adderall ER was "too much" and "worked a little too well." She would study for 10-12 hours during grad school. It was expensive, too.  And now switched all lot of people over for this reason I will get like try the arms no this pharmacy for each individual patient code to be at a desk top to do and can not She is still breastfeeding. Since last session, she has weaned off of Zoloft 50mg under the guidance of her Ob. Her  still has back pain, and his father's health is poor.     She denies SI, HI, AVH, twin.    Denies medication side effects. Denies SI, HI, AVH.    Relationships:            to Koko  Children:                    6 month old son Sawyer    Substance Use Screening:  Not asked this session    Review of Measures  PHQ-9:  5  BILL-7: 2  ASRS:   See media    Scores from last session:   PHQ-9:  7  BILL-7: 0    PHQ-9: 8  BILL-7: 1    PHQ9 Score:              12/27  GAD7 Score:              3/21    Review of PDMP  Reviewed this session. None since last session. Adderall 20mg x 60 filled from October 2020 to October 2021. Prescribed By Dr. Dunn-Lombard    Constitutional     VITAL SIGNS  Temp     /60    HR 61    RR      SpO2             MENTAL STATUS EXAM  General:  Alert and oriented x 4 Appearance is good grooming " and hygiene, appears stated age, Body mass index is 34.62kg/m². Behavior: friendly and cooperative, eye contact normal.  Gait, Posture and Muscle Strength/Tone: Normal posture and gait observed while watching patient walk to exam room. No gross abnormal movements noted.  Psychomotor Agitation and Retardation: none evident  Speech: Normal rate, volume, articulation, and tone.  Mood: denies depression  Affect: full  Thought Process: Linear and coherent. No flight of ideas.  Associations:  Intact. No loosening of associations.  Thought content: Denies suicidal and homicidal thoughts, plans and intentions.  Perceptions: Denies any auditory or visual hallucinations. Does not appear internally preoccupied.  Orientation: Alert and oriented to person, place, date and situation  Attention and Concentration: Intact.   Memory: Intact grossly   Language: No deficits noted   Fund of Knowledge: appropriate for age and level and education.   Insight:   good  Judgment: good  Impulse control: good      ASSESSMENT  Problem List Items Addressed This Visit          Psychiatric    ADHD (attention deficit hyperactivity disorder), combined type - Primary    Relevant Orders    Toxicology Screen, Urine (Completed)            PLAN  Patient Instructions   ADHD records for next visit  Start Concerta 36mg in the morning  Labs today  Habit tracker    Follow up in February. Message around 3 weeks about Concerta.       -------------------------------------------------------------------------------    Candi PIÑATIST - PSYCHIATRY    Today's encounter took a total time of 45 minutes.      Risks, Benefits, Side Effects and Alternatives were discussed with the patient today and consent was obtained for the current medication regimen. The patient was encouraged to ask questions and these questions were answered and the patient was engaged in psychoeducation regarding diagnosis, course of illness, accuracy of the diagnosis, and other general  elements regarding overall diagnosis and treatment consideration.     Any medications being used off-label were discussed with the patient inclusive of the evidence base for the use of the medications and consent was obtained for the off-label use of the medication.     Brief suicide risk assessment was performed with the patient today and safety planning was performed if indicated.    Note completed by: Candi Bruner MD, 12/29/2022 1:56 PM

## 2022-12-28 ENCOUNTER — OFFICE VISIT (OUTPATIENT)
Dept: PSYCHIATRY | Facility: CLINIC | Age: 32
End: 2022-12-28
Payer: COMMERCIAL

## 2022-12-28 ENCOUNTER — LAB VISIT (OUTPATIENT)
Dept: LAB | Facility: OTHER | Age: 32
End: 2022-12-28
Attending: PSYCHIATRY & NEUROLOGY
Payer: COMMERCIAL

## 2022-12-28 ENCOUNTER — PATIENT MESSAGE (OUTPATIENT)
Dept: PSYCHIATRY | Facility: CLINIC | Age: 32
End: 2022-12-28
Payer: COMMERCIAL

## 2022-12-28 VITALS
WEIGHT: 214.5 LBS | HEART RATE: 61 BPM | DIASTOLIC BLOOD PRESSURE: 60 MMHG | BODY MASS INDEX: 34.62 KG/M2 | SYSTOLIC BLOOD PRESSURE: 106 MMHG

## 2022-12-28 DIAGNOSIS — F90.2 ADHD (ATTENTION DEFICIT HYPERACTIVITY DISORDER), COMBINED TYPE: Primary | ICD-10-CM

## 2022-12-28 DIAGNOSIS — F90.2 ADHD (ATTENTION DEFICIT HYPERACTIVITY DISORDER), COMBINED TYPE: ICD-10-CM

## 2022-12-28 LAB
AMPHET+METHAMPHET UR QL: NEGATIVE
BARBITURATES UR QL SCN>200 NG/ML: NEGATIVE
BENZODIAZ UR QL SCN>200 NG/ML: NEGATIVE
BZE UR QL SCN: NEGATIVE
CANNABINOIDS UR QL SCN: NEGATIVE
CREAT UR-MCNC: 128.7 MG/DL (ref 15–325)
ETHANOL UR-MCNC: <10 MG/DL
METHADONE UR QL SCN>300 NG/ML: NEGATIVE
OPIATES UR QL SCN: NEGATIVE
PCP UR QL SCN>25 NG/ML: NEGATIVE
TOXICOLOGY INFORMATION: NORMAL

## 2022-12-28 PROCEDURE — 99999 PR PBB SHADOW E&M-EST. PATIENT-LVL III: CPT | Mod: PBBFAC,,, | Performed by: PSYCHIATRY & NEUROLOGY

## 2022-12-28 PROCEDURE — 3078F PR MOST RECENT DIASTOLIC BLOOD PRESSURE < 80 MM HG: ICD-10-PCS | Mod: CPTII,S$GLB,, | Performed by: PSYCHIATRY & NEUROLOGY

## 2022-12-28 PROCEDURE — 99214 OFFICE O/P EST MOD 30 MIN: CPT | Mod: S$GLB,,, | Performed by: PSYCHIATRY & NEUROLOGY

## 2022-12-28 PROCEDURE — 1160F RVW MEDS BY RX/DR IN RCRD: CPT | Mod: CPTII,S$GLB,, | Performed by: PSYCHIATRY & NEUROLOGY

## 2022-12-28 PROCEDURE — 1160F PR REVIEW ALL MEDS BY PRESCRIBER/CLIN PHARMACIST DOCUMENTED: ICD-10-PCS | Mod: CPTII,S$GLB,, | Performed by: PSYCHIATRY & NEUROLOGY

## 2022-12-28 PROCEDURE — 1159F MED LIST DOCD IN RCRD: CPT | Mod: CPTII,S$GLB,, | Performed by: PSYCHIATRY & NEUROLOGY

## 2022-12-28 PROCEDURE — 99214 PR OFFICE/OUTPT VISIT, EST, LEVL IV, 30-39 MIN: ICD-10-PCS | Mod: S$GLB,,, | Performed by: PSYCHIATRY & NEUROLOGY

## 2022-12-28 PROCEDURE — 3008F BODY MASS INDEX DOCD: CPT | Mod: CPTII,S$GLB,, | Performed by: PSYCHIATRY & NEUROLOGY

## 2022-12-28 PROCEDURE — 1159F PR MEDICATION LIST DOCUMENTED IN MEDICAL RECORD: ICD-10-PCS | Mod: CPTII,S$GLB,, | Performed by: PSYCHIATRY & NEUROLOGY

## 2022-12-28 PROCEDURE — 3074F PR MOST RECENT SYSTOLIC BLOOD PRESSURE < 130 MM HG: ICD-10-PCS | Mod: CPTII,S$GLB,, | Performed by: PSYCHIATRY & NEUROLOGY

## 2022-12-28 PROCEDURE — 3008F PR BODY MASS INDEX (BMI) DOCUMENTED: ICD-10-PCS | Mod: CPTII,S$GLB,, | Performed by: PSYCHIATRY & NEUROLOGY

## 2022-12-28 PROCEDURE — 3074F SYST BP LT 130 MM HG: CPT | Mod: CPTII,S$GLB,, | Performed by: PSYCHIATRY & NEUROLOGY

## 2022-12-28 PROCEDURE — 80307 DRUG TEST PRSMV CHEM ANLYZR: CPT | Performed by: PSYCHIATRY & NEUROLOGY

## 2022-12-28 PROCEDURE — 99999 PR PBB SHADOW E&M-EST. PATIENT-LVL III: ICD-10-PCS | Mod: PBBFAC,,, | Performed by: PSYCHIATRY & NEUROLOGY

## 2022-12-28 PROCEDURE — 3078F DIAST BP <80 MM HG: CPT | Mod: CPTII,S$GLB,, | Performed by: PSYCHIATRY & NEUROLOGY

## 2022-12-28 RX ORDER — METHYLPHENIDATE HYDROCHLORIDE 36 MG/1
36 TABLET ORAL EVERY MORNING
Qty: 30 TABLET | Refills: 0 | Status: SHIPPED | OUTPATIENT
Start: 2022-12-28 | End: 2022-12-28 | Stop reason: SDUPTHER

## 2022-12-28 NOTE — PATIENT INSTRUCTIONS
ADHD records for next visit  Start Concerta 36mg in the morning  Labs today  Habit tracker    Follow up in February. Message around 3 weeks about Concerta.

## 2022-12-29 PROBLEM — F90.2 ADHD (ATTENTION DEFICIT HYPERACTIVITY DISORDER), COMBINED TYPE: Status: ACTIVE | Noted: 2022-12-29

## 2022-12-29 PROBLEM — F41.8 POSTPARTUM ANXIETY: Status: RESOLVED | Noted: 2022-09-05 | Resolved: 2022-12-29

## 2022-12-29 PROBLEM — Z86.59 HISTORY OF ATTENTION DEFICIT HYPERACTIVITY DISORDER (ADHD): Status: RESOLVED | Noted: 2022-09-05 | Resolved: 2022-12-29

## 2022-12-31 ENCOUNTER — PATIENT MESSAGE (OUTPATIENT)
Dept: PSYCHIATRY | Facility: CLINIC | Age: 32
End: 2022-12-31
Payer: COMMERCIAL

## 2023-01-09 ENCOUNTER — PATIENT MESSAGE (OUTPATIENT)
Dept: OBSTETRICS AND GYNECOLOGY | Facility: CLINIC | Age: 33
End: 2023-01-09
Payer: COMMERCIAL

## 2023-01-10 ENCOUNTER — TELEPHONE (OUTPATIENT)
Dept: OBSTETRICS AND GYNECOLOGY | Facility: CLINIC | Age: 33
End: 2023-01-10
Payer: COMMERCIAL

## 2023-01-10 DIAGNOSIS — Z30.014 ENCOUNTER FOR INITIAL PRESCRIPTION OF INTRAUTERINE CONTRACEPTIVE DEVICE (IUD): Primary | ICD-10-CM

## 2023-01-10 NOTE — TELEPHONE ENCOUNTER
"The Mirena is inserted with menses. I will submit the PA for the Mirena for 2023 today. Please let me know if the spotting increases. Wendie  ===View-only below this line===      ----- Message -----       From:Patsy Chavez Galo       Sent:1/9/2023  8:06 PM CST         To:Roosevelt Duggan MD    Subject:Spotting 6.5 months pp/IUD insertion?    Hi Dr. Duggan,    I am spotting a tiny bit this evening (very light brown), and I was not sure if a light spotting is enough to start the IUD insertion process, or if I need to wait until I know for certain that I am having a "real" period. I am about 6.5 months post partum and I am only breastfeeding for baby's comfort at this point and not for his main meals. Please advise on whether it is still too soon or not for an IUD. Thank you!  "

## 2023-01-16 ENCOUNTER — PATIENT MESSAGE (OUTPATIENT)
Dept: PSYCHIATRY | Facility: CLINIC | Age: 33
End: 2023-01-16
Payer: COMMERCIAL

## 2023-01-20 ENCOUNTER — PATIENT MESSAGE (OUTPATIENT)
Dept: PSYCHIATRY | Facility: CLINIC | Age: 33
End: 2023-01-20
Payer: COMMERCIAL

## 2023-01-22 NOTE — PROGRESS NOTES
"Subsequent Psychiatric Session-VIRTUAL    32 y.o. female    Reason for Follow Up:   1. ADHD (attention deficit hyperactivity disorder), combined type    2. Postpartum anxiety in remission        Current Medications:    Current Outpatient Medications:     prenatal 25/iron fum/folic/dha (PRENATAL-1 ORAL), Take by mouth., Disp: , Rfl:     methylphenidate HCl 27 MG CR tablet, Take 1 tablet (27 mg total) by mouth every morning., Disp: 30 tablet, Rfl: 0     Date of Last Visit:   12/28/2022    In Clinic Since:   August 2022  Therapy:    None currently    Interval History  Last week was stressful at work, because it was a culmination of a number of projects. She has been "zoning out more" when her boss is talking. She was also sick with a stomach virus. She decided not to decrease the dose to 27mg, because she was not sure if anxiety was due to medication or work. Yesterday, she was able to focus on schoolwork and now she thinks maybe 36mg is the right dose. They have a lot of family coming down for [place]. Her brother- and sister-in-law and their two children are staying in the house. They are thinking this will be Koko's father's last [place]. She denies SI, HI, AVH, twin.    Relationships:            to Koko  Children:                    7 month old son Sawyer    Substance Use Screening:  Not asked this session    Review of Measures  PHQ-9:  9  BILL-7: 5    Scores from last session:   PHQ-9:  5  IBLL-7: 2  ASRS:   See media    Scores from initial session:  PHQ9 Score:              12/27  GAD7 Score:              3/21    Review of PDMP  Reviewed this session. Filled Concerta 36mg x 30 on 12/28    Constitutional     VITAL SIGNS  Temp     BP      HR 73    RR      SpO2             MENTAL STATUS EXAM  General:  Alert and oriented x 4 Appearance is good grooming and hygiene, appears stated age, Body mass index is 34.62kg/m². Behavior: friendly and cooperative, eye contact normal.  Gait, Posture and Muscle " "Strength/Tone: Normal posture observed. No gross abnormal movements noted.  Psychomotor Agitation and Retardation: none evident  Speech: Normal rate, volume, articulation, and tone.  Mood: "stressful"  Affect: full  Thought Process: Linear and coherent. No flight of ideas.  Associations:  Intact. No loosening of associations.  Thought content: Denies suicidal and homicidal thoughts, plans and intentions.  Perceptions: Denies any auditory or visual hallucinations. Does not appear internally preoccupied.  Orientation: Alert and oriented to person, place, date and situation  Attention and Concentration: Intact.   Memory: Intact grossly   Language: No deficits noted   Fund of Knowledge: appropriate for age and level and education.   Insight:   good  Judgment: good  Impulse control: good      ASSESSMENT  Problem List Items Addressed This Visit          Psychiatric    ADHD (attention deficit hyperactivity disorder), combined type    Relevant Medications    methylphenidate HCl 27 MG CR tablet              PLAN  Patient Instructions   Decrease Concerta from 36mg to 27mg daily. Message Dr. Bruner about anxiety.    Follow up in March.        -------------------------------------------------------------------------------    Candi Bruner  Islam - PSYCHIATRY    Today's encounter took a total time of 30 minutes, including patient interview and documentation    Due to the pandemic, today's session was performed over video. Patient is confirmed to be in the [facility]. The participants are patient and myself.     Risks, Benefits, Side Effects and Alternatives were discussed with the patient today and consent was obtained for the current medication regimen. The patient was encouraged to ask questions and these questions were answered and the patient was engaged in psychoeducation regarding diagnosis, course of illness, accuracy of the diagnosis, and other general elements regarding overall diagnosis and treatment " consideration.     Any medications being used off-label were discussed with the patient inclusive of the evidence base for the use of the medications and consent was obtained for the off-label use of the medication.     Brief suicide risk assessment was performed with the patient today and safety planning was performed if indicated.    Note completed by: Candi Bruner MD, 1/23/2023 1:56 PM

## 2023-01-23 ENCOUNTER — OFFICE VISIT (OUTPATIENT)
Dept: PSYCHIATRY | Facility: CLINIC | Age: 33
End: 2023-01-23
Payer: COMMERCIAL

## 2023-01-23 VITALS — BODY MASS INDEX: 33.89 KG/M2 | HEART RATE: 73 BPM | WEIGHT: 210 LBS

## 2023-01-23 DIAGNOSIS — F90.2 ADHD (ATTENTION DEFICIT HYPERACTIVITY DISORDER), COMBINED TYPE: ICD-10-CM

## 2023-01-23 PROCEDURE — 1159F PR MEDICATION LIST DOCUMENTED IN MEDICAL RECORD: ICD-10-PCS | Mod: CPTII,95,, | Performed by: PSYCHIATRY & NEUROLOGY

## 2023-01-23 PROCEDURE — 1159F MED LIST DOCD IN RCRD: CPT | Mod: CPTII,95,, | Performed by: PSYCHIATRY & NEUROLOGY

## 2023-01-23 PROCEDURE — 3008F BODY MASS INDEX DOCD: CPT | Mod: CPTII,95,, | Performed by: PSYCHIATRY & NEUROLOGY

## 2023-01-23 PROCEDURE — 3008F PR BODY MASS INDEX (BMI) DOCUMENTED: ICD-10-PCS | Mod: CPTII,95,, | Performed by: PSYCHIATRY & NEUROLOGY

## 2023-01-23 PROCEDURE — 99214 PR OFFICE/OUTPT VISIT, EST, LEVL IV, 30-39 MIN: ICD-10-PCS | Mod: 95,,, | Performed by: PSYCHIATRY & NEUROLOGY

## 2023-01-23 PROCEDURE — 99214 OFFICE O/P EST MOD 30 MIN: CPT | Mod: 95,,, | Performed by: PSYCHIATRY & NEUROLOGY

## 2023-01-23 RX ORDER — METHYLPHENIDATE HYDROCHLORIDE 27 MG/1
27 TABLET ORAL EVERY MORNING
Qty: 30 TABLET | Refills: 0 | Status: SHIPPED | OUTPATIENT
Start: 2023-01-23 | End: 2023-01-31 | Stop reason: SDUPTHER

## 2023-01-23 NOTE — PATIENT INSTRUCTIONS
Decrease Concerta from 36mg to 27mg daily. Message Dr. Bruner about anxiety.    Follow up in mid-February.

## 2023-01-31 ENCOUNTER — PATIENT MESSAGE (OUTPATIENT)
Dept: PSYCHIATRY | Facility: CLINIC | Age: 33
End: 2023-01-31
Payer: COMMERCIAL

## 2023-01-31 DIAGNOSIS — F90.2 ADHD (ATTENTION DEFICIT HYPERACTIVITY DISORDER), COMBINED TYPE: ICD-10-CM

## 2023-01-31 RX ORDER — METHYLPHENIDATE HYDROCHLORIDE 36 MG/1
36 TABLET ORAL EVERY MORNING
Qty: 30 TABLET | Refills: 0 | Status: SHIPPED | OUTPATIENT
Start: 2023-01-31 | End: 2023-02-02 | Stop reason: SDUPTHER

## 2023-02-02 ENCOUNTER — PATIENT MESSAGE (OUTPATIENT)
Dept: PSYCHIATRY | Facility: CLINIC | Age: 33
End: 2023-02-02
Payer: COMMERCIAL

## 2023-02-06 ENCOUNTER — PATIENT MESSAGE (OUTPATIENT)
Dept: OBSTETRICS AND GYNECOLOGY | Facility: CLINIC | Age: 33
End: 2023-02-06
Payer: COMMERCIAL

## 2023-03-01 DIAGNOSIS — F90.2 ADHD (ATTENTION DEFICIT HYPERACTIVITY DISORDER), COMBINED TYPE: ICD-10-CM

## 2023-03-01 RX ORDER — METHYLPHENIDATE HYDROCHLORIDE 36 MG/1
36 TABLET ORAL EVERY MORNING
Qty: 30 TABLET | Refills: 0 | Status: SHIPPED | OUTPATIENT
Start: 2023-03-08 | End: 2023-03-20

## 2023-03-20 ENCOUNTER — OFFICE VISIT (OUTPATIENT)
Dept: PSYCHIATRY | Facility: CLINIC | Age: 33
End: 2023-03-20
Payer: COMMERCIAL

## 2023-03-20 VITALS — BODY MASS INDEX: 33.89 KG/M2 | HEART RATE: 79 BPM | WEIGHT: 210 LBS

## 2023-03-20 DIAGNOSIS — F90.2 ADHD (ATTENTION DEFICIT HYPERACTIVITY DISORDER), COMBINED TYPE: Primary | ICD-10-CM

## 2023-03-20 PROCEDURE — 99214 PR OFFICE/OUTPT VISIT, EST, LEVL IV, 30-39 MIN: ICD-10-PCS | Mod: 95,,, | Performed by: PSYCHIATRY & NEUROLOGY

## 2023-03-20 PROCEDURE — 99214 OFFICE O/P EST MOD 30 MIN: CPT | Mod: 95,,, | Performed by: PSYCHIATRY & NEUROLOGY

## 2023-03-20 PROCEDURE — 3008F PR BODY MASS INDEX (BMI) DOCUMENTED: ICD-10-PCS | Mod: CPTII,95,, | Performed by: PSYCHIATRY & NEUROLOGY

## 2023-03-20 PROCEDURE — 1159F MED LIST DOCD IN RCRD: CPT | Mod: CPTII,95,, | Performed by: PSYCHIATRY & NEUROLOGY

## 2023-03-20 PROCEDURE — 3008F BODY MASS INDEX DOCD: CPT | Mod: CPTII,95,, | Performed by: PSYCHIATRY & NEUROLOGY

## 2023-03-20 PROCEDURE — 1160F RVW MEDS BY RX/DR IN RCRD: CPT | Mod: CPTII,95,, | Performed by: PSYCHIATRY & NEUROLOGY

## 2023-03-20 PROCEDURE — 90833 PSYTX W PT W E/M 30 MIN: CPT | Mod: 95,,, | Performed by: PSYCHIATRY & NEUROLOGY

## 2023-03-20 PROCEDURE — 1160F PR REVIEW ALL MEDS BY PRESCRIBER/CLIN PHARMACIST DOCUMENTED: ICD-10-PCS | Mod: CPTII,95,, | Performed by: PSYCHIATRY & NEUROLOGY

## 2023-03-20 PROCEDURE — 1159F PR MEDICATION LIST DOCUMENTED IN MEDICAL RECORD: ICD-10-PCS | Mod: CPTII,95,, | Performed by: PSYCHIATRY & NEUROLOGY

## 2023-03-20 PROCEDURE — 90833 PR PSYCHOTHERAPY W/PATIENT W/E&M, 30 MIN (ADD ON): ICD-10-PCS | Mod: 95,,, | Performed by: PSYCHIATRY & NEUROLOGY

## 2023-03-20 RX ORDER — LISDEXAMFETAMINE DIMESYLATE 30 MG/1
30 CAPSULE ORAL EVERY MORNING
Qty: 30 CAPSULE | Refills: 0 | Status: SHIPPED | OUTPATIENT
Start: 2023-03-20 | End: 2023-04-13 | Stop reason: SDUPTHER

## 2023-03-20 NOTE — PROGRESS NOTES
"  Subsequent Psychiatric Session-VIRTUAL    32 y.o. female    Reason for Follow Up:   1. ADHD (attention deficit hyperactivity disorder), combined type    2. Postpartum anxiety in remission    Current Medications:    Current Outpatient Medications:     prenatal 25/iron fum/folic/dha (PRENATAL-1 ORAL), Take by mouth., Disp: , Rfl:     lisdexamfetamine (VYVANSE) 30 MG capsule, Take 1 capsule (30 mg total) by mouth every morning., Disp: 30 capsule, Rfl: 0     Date of Last Visit:   01/23/23    In Clinic Since:   August 2022  Therapy:    None currently    Interval History  Patient felt that she was "in a bit of a rut Friday" and feeling "burnt out from the week." She feels better today now that she has rested. She has continued to have trouble concentrating. She feels that there is a big crash in the afternoon with Concerta, and she has had to pay out of pocket. Her friend recommended Vyvanse. She is back to working out once per week. She feels better after lifting. Her CALLUM's health is improved. Her  is doing more maritime law, which is what he enjoys. She is still breast-feeding for comfort, but she thinks she may stop soon. She denies SI, HI, AVH, twin.    Relationships:            to Koko  Children:                    9 month old son Sawyer    PSYCHOTHERAPY ADD-ON +[phone] 30 minutes (range 16-37 minutes)  Therapeutic intervention type: supportive psychotherapy  Why chosen therapy is appropriate versus another modality: relevant to diagnosis  Target symptoms addressed: motherhood, stress  Topics and themes discussed: See above  Primary focus: see above  Psychotherapeutic techniques employed: active listening and empathic responses  Outcome monitoring methods: self-report, observation  The patient's response to the intervention is: accepting  The patient's progress toward treatment goals is: good  Duration of intervention: 20 minutes      Substance Use Screening:  Not asked this session    Review of " "Measures  PHQ-9:  9  BILL-7: 5    Scores from last session:   PHQ-9:  9  BILL-7: 5    PHQ-9:  5  BILL-7: 2  ASRS:   See media    Scores from initial session:  PHQ9 Score:              12/27  GAD7 Score:              3/21    Review of PDMP  Reviewed this session. Filled Concerta 36mg x 30 on 12/28    Constitutional     VITAL SIGNS  Temp     BP      HR 79    RR      SpO2             MENTAL STATUS EXAM  General:  Alert and oriented x 4 Appearance is good grooming and hygiene, appears stated age, Body mass index is 33.89kg/m². Behavior: friendly and cooperative, eye contact normal.  Gait, Posture and Muscle Strength/Tone: Normal posture observed. No gross abnormal movements noted.  Psychomotor Agitation and Retardation: none evident  Speech: Normal rate, volume, articulation, and tone.  Mood: "better"  Affect: full  Thought Process: Linear and coherent. No flight of ideas.  Associations:  Intact. No loosening of associations.  Thought content: Denies suicidal and homicidal thoughts, plans and intentions.  Perceptions: Denies any auditory or visual hallucinations. Does not appear internally preoccupied.  Orientation: Alert and oriented to person, place, date and situation  Attention and Concentration: Intact.   Memory: Intact grossly   Language: No deficits noted   Fund of Knowledge: appropriate for age and level and education.   Insight:   good  Judgment: good  Impulse control: good      ASSESSMENT  Problem List Items Addressed This Visit          Psychiatric    ADHD (attention deficit hyperactivity disorder), combined type - Primary    Relevant Medications    lisdexamfetamine (VYVANSE) 30 MG capsule                PLAN  There are no Patient Instructions on file for this visit.        -------------------------------------------------------------------------------    Candi LOMBARDO - PSYCHIATRY    Today's encounter took a total time of 30 minutes, including patient interview and documentation    Due to the " pandemic, today's session was performed over video. Patient is confirmed to be in the State [facility]. The participants are patient and myself.     Risks, Benefits, Side Effects and Alternatives were discussed with the patient today and consent was obtained for the current medication regimen. The patient was encouraged to ask questions and these questions were answered and the patient was engaged in psychoeducation regarding diagnosis, course of illness, accuracy of the diagnosis, and other general elements regarding overall diagnosis and treatment consideration.     Any medications being used off-label were discussed with the patient inclusive of the evidence base for the use of the medications and consent was obtained for the off-label use of the medication.     Brief suicide risk assessment was performed with the patient today and safety planning was performed if indicated.    Note completed by: Candi Bruner MD, 3/21/2023 1:56 PM

## 2023-03-26 ENCOUNTER — PATIENT MESSAGE (OUTPATIENT)
Dept: OBSTETRICS AND GYNECOLOGY | Facility: CLINIC | Age: 33
End: 2023-03-26
Payer: COMMERCIAL

## 2023-03-27 ENCOUNTER — TELEPHONE (OUTPATIENT)
Dept: OBSTETRICS AND GYNECOLOGY | Facility: CLINIC | Age: 33
End: 2023-03-27
Payer: COMMERCIAL

## 2023-03-27 DIAGNOSIS — Z30.014 ENCOUNTER FOR INITIAL PRESCRIPTION OF INTRAUTERINE CONTRACEPTIVE DEVICE (IUD): Primary | ICD-10-CM

## 2023-03-27 NOTE — TELEPHONE ENCOUNTER
Patsy HERNANDEZ Staff (supporting Roosevelt Duggan MD) 32 minutes ago (10:59 AM)       Attached is a picture of my insurance card    Attachments   [id number]       Ptasy HERNANDEZ Staff (supporting Roosevelt Duggan MD) 34 minutes ago (10:57 AM)       Thank you, Wendie. It's [employer]. I can send you a picture of my insurance card if it helps        You  Patsy Rosenthal 39 minutes ago (10:52 AM)     DI  We will have to send a PA request for 2023. Which insurance do you have?

## 2023-04-13 ENCOUNTER — PATIENT MESSAGE (OUTPATIENT)
Dept: PSYCHIATRY | Facility: CLINIC | Age: 33
End: 2023-04-13
Payer: COMMERCIAL

## 2023-04-13 DIAGNOSIS — F90.2 ADHD (ATTENTION DEFICIT HYPERACTIVITY DISORDER), COMBINED TYPE: ICD-10-CM

## 2023-04-13 RX ORDER — LISDEXAMFETAMINE DIMESYLATE 30 MG/1
30 CAPSULE ORAL EVERY MORNING
Qty: 30 CAPSULE | Refills: 0 | Status: SHIPPED | OUTPATIENT
Start: 2023-04-20 | End: 2023-05-01

## 2023-04-19 ENCOUNTER — PATIENT MESSAGE (OUTPATIENT)
Dept: PSYCHIATRY | Facility: CLINIC | Age: 33
End: 2023-04-19
Payer: COMMERCIAL

## 2023-04-26 NOTE — PROGRESS NOTES
"Subsequent Psychiatric Session-VIRTUAL    32 y.o. female    Reason for Follow Up:   1. ADHD (attention deficit hyperactivity disorder), combined type    2. Postpartum anxiety in remission    Current Medications:    Current Outpatient Medications:     prenatal 25/iron fum/folic/dha (PRENATAL-1 ORAL), Take by mouth., Disp: , Rfl:     [START ON 5/18/2023] dextroamphetamine-amphetamine (ADDERALL XR) 10 MG 24 hr capsule, Take 1 capsule (10 mg total) by mouth every morning., Disp: 30 capsule, Rfl: 0     Date of Last Visit:   03/20/23    In Clinic Since:   August 2022  Therapy:    None currently    Interval History  Patient says she feels "pretty good right now." She still has "some brain fog," but she sees a big improvement since switching from Concerta to Vyvanse. She finds herself less daunted by tasks. Vyvanse was still $300 even though it was covered by insurance. When she sent her last message, she was at a business conference and felt it hard to read more than a sentence. She is worried about getting back into school. She is taking a class on [employer] and [employer]. Discussed techniques for reading comprehension. She has learned about some ADHD-friendly resources. She is still breastfeeding for comfort through the night. She denies SI, HI, AVH, twin.    Relationships:            to Koko  Children:                    10 month old son Sawyer    Substance Use Screening:  Marijuana:  None  Alcohol: 4 days/month, 2 drinks per sitting  Other drugs:  None    Review of Measures  PHQ-9:  2  BILL-7: 3    Scores from last session:   PHQ-9:  9  BILL-7: 5    PHQ-9:  5  BILL-7: 2  ASRS:   See media    Scores from initial session:  PHQ9 Score:              12/27  GAD7 Score:              3/21    Review of PDMP  Reviewed this session. Filled Vyvanse on 04/20    Constitutional     VITAL SIGNS  Temp     BP      HR 86    RR      SpO2             MENTAL STATUS EXAM  General:  Alert and oriented x 4 Appearance is good grooming and " hygiene, appears stated age, Body mass index is 33.89kg/m². Behavior: friendly and cooperative, eye contact normal.  Gait, Posture and Muscle Strength/Tone: Normal posture observed. No gross abnormal movements noted.  Psychomotor Agitation and Retardation: none evident  Speech: Normal rate, volume, articulation, and tone.  Mood: denies depression  Affect: full  Thought Process: Linear and coherent. No flight of ideas.  Associations:  Intact. No loosening of associations.  Thought content: Denies suicidal and homicidal thoughts, plans and intentions.  Perceptions: Denies any auditory or visual hallucinations. Does not appear internally preoccupied.  Orientation: Alert and oriented to person, place, date and situation  Attention and Concentration: Intact.   Memory: Intact grossly   Language: No deficits noted   Fund of Knowledge: appropriate for age and level and education.   Insight:   good  Judgment: good  Impulse control: good      ASSESSMENT  Problem List Items Addressed This Visit          Psychiatric    ADHD (attention deficit hyperactivity disorder), combined type - Primary    Relevant Medications    dextroamphetamine-amphetamine (ADDERALL XR) 10 MG 24 hr capsule (Start on 5/18/2023)         PLAN  Patient Instructions   Dr. Claudia Pan. Psychotherapy: [phone].   Switch from Vyvanse to Adderall XR 10mg for insurance reasons.    Follow up on June 5th.          -------------------------------------------------------------------------------    Candi PIÑATIST - PSYCHIATRY    Today's encounter took a total time of 30 minutes, including patient interview and documentation    Due to the pandemic, today's session was performed over video. Patient is confirmed to be in the [facility]. The participants are patient and myself.     Risks, Benefits, Side Effects and Alternatives were discussed with the patient today and consent was obtained for the current medication regimen. The patient was  encouraged to ask questions and these questions were answered and the patient was engaged in psychoeducation regarding diagnosis, course of illness, accuracy of the diagnosis, and other general elements regarding overall diagnosis and treatment consideration.     Any medications being used off-label were discussed with the patient inclusive of the evidence base for the use of the medications and consent was obtained for the off-label use of the medication.     Brief suicide risk assessment was performed with the patient today and safety planning was performed if indicated.    Note completed by: Candi Bruner MD, 5/1/2023 1:56 PM

## 2023-05-01 ENCOUNTER — OFFICE VISIT (OUTPATIENT)
Dept: PSYCHIATRY | Facility: CLINIC | Age: 33
End: 2023-05-01
Payer: COMMERCIAL

## 2023-05-01 VITALS — BODY MASS INDEX: 33.91 KG/M2 | HEART RATE: 86 BPM | WEIGHT: 210.13 LBS

## 2023-05-01 DIAGNOSIS — F90.2 ADHD (ATTENTION DEFICIT HYPERACTIVITY DISORDER), COMBINED TYPE: Primary | ICD-10-CM

## 2023-05-01 PROCEDURE — 3008F BODY MASS INDEX DOCD: CPT | Mod: CPTII,95,, | Performed by: PSYCHIATRY & NEUROLOGY

## 2023-05-01 PROCEDURE — 1160F PR REVIEW ALL MEDS BY PRESCRIBER/CLIN PHARMACIST DOCUMENTED: ICD-10-PCS | Mod: CPTII,95,, | Performed by: PSYCHIATRY & NEUROLOGY

## 2023-05-01 PROCEDURE — 1159F PR MEDICATION LIST DOCUMENTED IN MEDICAL RECORD: ICD-10-PCS | Mod: CPTII,95,, | Performed by: PSYCHIATRY & NEUROLOGY

## 2023-05-01 PROCEDURE — 3008F PR BODY MASS INDEX (BMI) DOCUMENTED: ICD-10-PCS | Mod: CPTII,95,, | Performed by: PSYCHIATRY & NEUROLOGY

## 2023-05-01 PROCEDURE — 1160F RVW MEDS BY RX/DR IN RCRD: CPT | Mod: CPTII,95,, | Performed by: PSYCHIATRY & NEUROLOGY

## 2023-05-01 PROCEDURE — 99214 PR OFFICE/OUTPT VISIT, EST, LEVL IV, 30-39 MIN: ICD-10-PCS | Mod: 95,,, | Performed by: PSYCHIATRY & NEUROLOGY

## 2023-05-01 PROCEDURE — 1159F MED LIST DOCD IN RCRD: CPT | Mod: CPTII,95,, | Performed by: PSYCHIATRY & NEUROLOGY

## 2023-05-01 PROCEDURE — 99214 OFFICE O/P EST MOD 30 MIN: CPT | Mod: 95,,, | Performed by: PSYCHIATRY & NEUROLOGY

## 2023-05-01 RX ORDER — DEXTROAMPHETAMINE SACCHARATE, AMPHETAMINE ASPARTATE MONOHYDRATE, DEXTROAMPHETAMINE SULFATE AND AMPHETAMINE SULFATE 2.5; 2.5; 2.5; 2.5 MG/1; MG/1; MG/1; MG/1
10 CAPSULE, EXTENDED RELEASE ORAL EVERY MORNING
Qty: 30 CAPSULE | Refills: 0 | Status: SHIPPED | OUTPATIENT
Start: 2023-05-18 | End: 2023-06-05 | Stop reason: SDUPTHER

## 2023-05-01 NOTE — PATIENT INSTRUCTIONS
Dr. Claudia Pan. Psychotherapy: [phone].   Switch from Vyvanse to Adderall XR 10mg for insurance reasons.    Follow up on June 5th.

## 2023-05-07 ENCOUNTER — PATIENT MESSAGE (OUTPATIENT)
Dept: OBSTETRICS AND GYNECOLOGY | Facility: CLINIC | Age: 33
End: 2023-05-07
Payer: COMMERCIAL

## 2023-05-08 ENCOUNTER — TELEPHONE (OUTPATIENT)
Dept: OBSTETRICS AND GYNECOLOGY | Facility: CLINIC | Age: 33
End: 2023-05-08
Payer: COMMERCIAL

## 2023-05-08 ENCOUNTER — PATIENT MESSAGE (OUTPATIENT)
Dept: OBSTETRICS AND GYNECOLOGY | Facility: CLINIC | Age: 33
End: 2023-05-08
Payer: COMMERCIAL

## 2023-05-08 DIAGNOSIS — Z30.430 ENCOUNTER FOR IUD INSERTION: Primary | ICD-10-CM

## 2023-05-08 RX ORDER — MISOPROSTOL 200 UG/1
TABLET ORAL
Qty: 2 TABLET | Refills: 0 | Status: SHIPPED | OUTPATIENT
Start: 2023-05-08 | End: 2023-05-12

## 2023-05-08 NOTE — TELEPHONE ENCOUNTER
Please let her know that local anesthesia is generally not used or needed or an IUD insertion.    I have sent a Rx for Cytotec to her pharmacy to help with the dilation of her cervix.

## 2023-05-08 NOTE — TELEPHONE ENCOUNTER
Dr Duggan may order a medication to help soften the cervix and usually recommends you take Advil or Motrin one hour prior. I will need a urine when you come in. Wendie  ===View-only below this line===      ----- Message -----       From:Patsy Rosenthal       Sent:5/8/2023 11:05 AM CDT         To:Patient Medical Advice Request Message List    Subject:IUD Insertion     Or if Dr. Duggan believes it will be relatively painless I will trust what he says. I'm just scared lol my friends told me way too many horror stories about IUD insertion       ----- Message -----       From:Patsy Rosenthal       Sent:5/8/2023 10:54 AM CDT         To:Patient Medical Advice Request Message List    Subject:IUD Insertion     Can I please have the option to have a local anesthetic for the procedure?      ----- Message -----       From:Patsy Rosenthal       Sent:5/8/2023 10:52 AM CDT         To:Patient Medical Advice Request Message List    Subject:IUD Insertion     Yes that will work      ----- Message -----       From:Med Assistant Wendie       Sent:5/8/2023 10:50 AM CDT         To:Patsy Rosenthal    Subject:IUD Insertion     Will 9:00 Friday at [employer] work?      ----- Message -----       From:Patsy Rosenthal       Sent:5/7/2023 10:35 AM CDT         To:Roosevelt Duggan MD    Subject:IUD Insertion     Hello, my period started today. I believe Nurse Pressley has an approved request for me for a Mirena insertion. Please let me know when I can come in this week for the insertion, and please

## 2023-05-11 ENCOUNTER — TELEPHONE (OUTPATIENT)
Dept: OBSTETRICS AND GYNECOLOGY | Facility: CLINIC | Age: 33
End: 2023-05-11
Payer: COMMERCIAL

## 2023-05-11 NOTE — TELEPHONE ENCOUNTER
Patient was advised how to take the Cytotec and the Motrin before her procedure. Patient verbalized understanding and will comply

## 2023-05-11 NOTE — TELEPHONE ENCOUNTER
----- Message from Jules Zamudio MA sent at 5/11/2023  1:35 PM CDT -----  Regarding: Mirena insert  Pt would like to speak with someone  regarding the medication she should take before Mirena Insertion. She can be reached via [employer] as well as by phone at [phone].

## 2023-05-12 ENCOUNTER — PROCEDURE VISIT (OUTPATIENT)
Dept: OBSTETRICS AND GYNECOLOGY | Facility: CLINIC | Age: 33
End: 2023-05-12
Attending: OBSTETRICS & GYNECOLOGY
Payer: COMMERCIAL

## 2023-05-12 VITALS
SYSTOLIC BLOOD PRESSURE: 112 MMHG | BODY MASS INDEX: 33.55 KG/M2 | HEIGHT: 66 IN | DIASTOLIC BLOOD PRESSURE: 68 MMHG | WEIGHT: 208.75 LBS

## 2023-05-12 DIAGNOSIS — Z30.430 ENCOUNTER FOR INSERTION OF INTRAUTERINE CONTRACEPTIVE DEVICE (IUD): Primary | ICD-10-CM

## 2023-05-12 PROCEDURE — 81025 PR  URINE PREGNANCY TEST: ICD-10-PCS | Mod: S$GLB,,, | Performed by: OBSTETRICS & GYNECOLOGY

## 2023-05-12 PROCEDURE — 81025 URINE PREGNANCY TEST: CPT | Mod: S$GLB,,, | Performed by: OBSTETRICS & GYNECOLOGY

## 2023-05-12 PROCEDURE — 58300 INSERT INTRAUTERINE DEVICE: CPT | Mod: S$GLB,,, | Performed by: OBSTETRICS & GYNECOLOGY

## 2023-05-12 PROCEDURE — 58300 PR INSERT INTRAUTERINE DEVICE: ICD-10-PCS | Mod: S$GLB,,, | Performed by: OBSTETRICS & GYNECOLOGY

## 2023-05-12 PROCEDURE — 87591 N.GONORRHOEAE DNA AMP PROB: CPT | Performed by: OBSTETRICS & GYNECOLOGY

## 2023-05-12 RX ORDER — LISDEXAMFETAMINE DIMESYLATE 30 MG/1
30 CAPSULE ORAL EVERY MORNING
COMMUNITY
End: 2023-05-24

## 2023-05-12 NOTE — PROCEDURES
"CC: IUD PLACEMENT    Patsy Rosenthal is a 32 y.o. female  presents for an IUD placement.  She is S/P  2022 and is breast and bottle feeding.  Her last period began several days ago and she is currently bleeding.  She has considered her contraceptive options and desires the Mirena IUD.    Blood pressure 112/68, height 5' 6" (1.676 m), weight 94.7 kg (208 lb 12.4 oz), currently breastfeeding.    UPT today is negative.      PRE-IUD PLACEMENT COUNSELING:  All contraceptive options were reviewed and the patient chooses an IUD.  The patient's history was reviewed and there are no contraindications to an IUD. The procedure and minimal risks of pain, bleeding, perforation and infection at the insertion and spontaneous expulsion within the first two weeks was discussed. The benefits of amenorrhea and no systemic side effects were explained. All questions were answered and the patient agrees to proceed. Consent was signed (scanned into computer).    EXAM:  Uterine Position: anteflexed    PROCEDURE:  TIME OUT PERFORMED.  The cervix visualized with a speculum   GC/CT performed.  Cervix prepped with betadine.  A single tooth tenaculum placed on the anterior lip.  The uterus sounded to  7.5  cm using sterile technique.  A Mirena IUD was loaded and easily placed high in uterine fundus without difficulty using sterile technique.  The string was then cut.  The tenaculum and speculum were removed.   The patient tolerated the procedure well.    ASSESSMENT:  1. Contraception management / IUD insertion.    POST IUD PLACEMENT COUNSELING:  Manage post IUD placement pain with NSAIDs or Tylenol.  IUD danger signs and how to check the strings.  Removal in 8 years for Mirena IUD    FOLLOW-UP: 4 weeks.    "

## 2023-05-13 ENCOUNTER — PATIENT MESSAGE (OUTPATIENT)
Dept: OBSTETRICS AND GYNECOLOGY | Facility: CLINIC | Age: 33
End: 2023-05-13
Payer: COMMERCIAL

## 2023-05-13 LAB
C TRACH DNA SPEC QL NAA+PROBE: NOT DETECTED
N GONORRHOEA DNA SPEC QL NAA+PROBE: NOT DETECTED

## 2023-05-19 ENCOUNTER — OFFICE VISIT (OUTPATIENT)
Dept: PRIMARY CARE CLINIC | Facility: CLINIC | Age: 33
End: 2023-05-19
Payer: COMMERCIAL

## 2023-05-19 DIAGNOSIS — F90.9 ATTENTION DEFICIT HYPERACTIVITY DISORDER (ADHD), UNSPECIFIED ADHD TYPE: ICD-10-CM

## 2023-05-19 DIAGNOSIS — F41.8 POSTPARTUM ANXIETY: Primary | ICD-10-CM

## 2023-05-19 PROCEDURE — 99202 PR OFFICE/OUTPT VISIT, NEW, LEVL II, 15-29 MIN: ICD-10-PCS | Mod: 95,,, | Performed by: FAMILY MEDICINE

## 2023-05-19 PROCEDURE — 1159F PR MEDICATION LIST DOCUMENTED IN MEDICAL RECORD: ICD-10-PCS | Mod: CPTII,95,, | Performed by: FAMILY MEDICINE

## 2023-05-19 PROCEDURE — 1160F PR REVIEW ALL MEDS BY PRESCRIBER/CLIN PHARMACIST DOCUMENTED: ICD-10-PCS | Mod: CPTII,95,, | Performed by: FAMILY MEDICINE

## 2023-05-19 PROCEDURE — 99202 OFFICE O/P NEW SF 15 MIN: CPT | Mod: 95,,, | Performed by: FAMILY MEDICINE

## 2023-05-19 PROCEDURE — 1159F MED LIST DOCD IN RCRD: CPT | Mod: CPTII,95,, | Performed by: FAMILY MEDICINE

## 2023-05-19 PROCEDURE — 1160F RVW MEDS BY RX/DR IN RCRD: CPT | Mod: CPTII,95,, | Performed by: FAMILY MEDICINE

## 2023-05-19 NOTE — PROGRESS NOTES
The patient location is: [place]  The chief complaint leading to consultation is: ADHD    Visit type: audiovisual    Face to Face time with patient:   18 minutes of total time spent on the encounter, which includes face to face time and non-face to face time preparing to see the patient (eg, review of tests), Obtaining and/or reviewing separately obtained history, Documenting clinical information in the electronic or other health record, Independently interpreting results (not separately reported) and communicating results to the patient/family/caregiver, or Care coordination (not separately reported).         Each patient to whom he or she provides medical services by telemedicine is:  (1) informed of the relationship between the physician and patient and the respective role of any other health care provider with respect to management of the patient; and (2) notified that he or she may decline to receive medical services by telemedicine and may withdraw from such care at any time.    Notes:    HPI:  The patient is a 32-year-old female with a history of ADHD, depression seeking a new provider to continue management. Patient reports onset of depression and ADHD since age 27.  She has received counseling in the past.  Reports over past 10 months postpartum anxiety.  She was prescribed Zoloft..    Physical exam:  General: Alert, no acute distress   HEENT:  NC/AT, no facial swelling of deformity  Neck:  supple  Resp:  Normal effort, no respiratory distress   Psych:  Normal affect   Of problem 1  Diagnoses and all orders for this visit:    Postpartum anxiety  Recommend resuming SSRI for anxiety    Attention deficit hyperactivity disorder (ADHD), unspecified ADHD type  Patient has an appointment with Psychiatry next month

## 2023-05-24 ENCOUNTER — PATIENT MESSAGE (OUTPATIENT)
Dept: PSYCHIATRY | Facility: CLINIC | Age: 33
End: 2023-05-24
Payer: COMMERCIAL

## 2023-05-24 DIAGNOSIS — F90.2 ADHD (ATTENTION DEFICIT HYPERACTIVITY DISORDER), COMBINED TYPE: Primary | ICD-10-CM

## 2023-05-24 RX ORDER — DEXTROAMPHETAMINE SACCHARATE, AMPHETAMINE ASPARTATE, DEXTROAMPHETAMINE SULFATE AND AMPHETAMINE SULFATE 1.25; 1.25; 1.25; 1.25 MG/1; MG/1; MG/1; MG/1
5 TABLET ORAL DAILY
Qty: 30 TABLET | Refills: 0 | Status: SHIPPED | OUTPATIENT
Start: 2023-05-24 | End: 2023-06-05

## 2023-05-31 NOTE — PROGRESS NOTES
Subsequent Psychiatric Session-VIRTUAL    32 y.o. female    Reason for Follow Up:   1. ADHD (attention deficit hyperactivity disorder), combined type    2. Postpartum anxiety in remission    Current Medications:    Current Outpatient Medications:     prenatal 25/iron fum/folic/dha (PRENATAL-1 ORAL), Take by mouth., Disp: , Rfl:     dextroamphetamine-amphetamine (ADDERALL XR) 15 MG 24 hr capsule, Take 1 capsule (15 mg total) by mouth every morning., Disp: 30 capsule, Rfl: 0    [START ON 8/8/2023] dextroamphetamine-amphetamine (ADDERALL XR) 15 MG 24 hr capsule, Take 1 capsule (15 mg total) by mouth every morning., Disp: 30 capsule, Rfl: 0    [START ON 7/6/2023] dextroamphetamine-amphetamine (ADDERALL XR) 15 MG 24 hr capsule, Take 1 capsule (15 mg total) by mouth every morning., Disp: 30 capsule, Rfl: 0     Date of Last Visit:   05/01/23    In Clinic Since:   August 2022  Therapy:    None currently    Interval History  Sawyer had a seizure yesterday. It occurred via breastfeeding. He was warm to the touch and cranky earlier that day. The doctors don't believe it was the adderall through breastfeeding. Her brother-in-law and mother-in-law had febrile seizures. The hospital will be running tests. The IR dosage has been doing well for her. She completed her work a week early. She denies SI, HI, AVH, twin.    Relationships:            to Koko  Children:                    10 month old son Sawyer    Substance Use Screening:  Marijuana:  None  Alcohol: 1-2 days/month, 2 drinks per sitting  Other drugs:  None    Review of Measures  PHQ-9:  2  BILL-7: 2    Scores from last session:   PHQ-9:  2  BILL-7: 3    PHQ-9:  9  BILL-7: 5    PHQ-9:  5  BILL-7: 2  ASRS:   See media    Scores from initial session:  PHQ9 Score:              12/27  GAD7 Score:              3/21    Review of PDMP  Reviewed this session. Filled Vyvanse on 04/20    Constitutional     VITAL SIGNS  Temp     BP      HR 78    RR      SpO2             MENTAL  STATUS EXAM  General:  Alert and oriented x 4 Appearance is good grooming and hygiene, appears stated age, Body mass index is 33.25kg/m². Behavior: friendly and cooperative, eye contact normal.  Gait, Posture and Muscle Strength/Tone: Normal posture observed. No gross abnormal movements noted.  Psychomotor Agitation and Retardation: none evident  Speech: Normal rate, volume, articulation, and tone.  Mood: denies depression  Affect: full  Thought Process: Linear and coherent. No flight of ideas.  Associations:  Intact. No loosening of associations.  Thought content: Denies suicidal and homicidal thoughts, plans and intentions.  Perceptions: Denies any auditory or visual hallucinations. Does not appear internally preoccupied.  Orientation: Alert and oriented to person, place, date and situation  Attention and Concentration: Intact.   Memory: Intact grossly   Language: No deficits noted   Fund of Knowledge: appropriate for age and level and education.   Insight:   good  Judgment: good  Impulse control: good      ASSESSMENT  Problem List Items Addressed This Visit          Psychiatric    ADHD (attention deficit hyperactivity disorder), combined type    Relevant Medications    dextroamphetamine-amphetamine (ADDERALL XR) 15 MG 24 hr capsule    dextroamphetamine-amphetamine (ADDERALL XR) 15 MG 24 hr capsule (Start on 8/8/2023)    dextroamphetamine-amphetamine (ADDERALL XR) 15 MG 24 hr capsule (Start on 7/6/2023)       PLAN  Patient Instructions   Call [phone] for therapy and psychiatry.   Increase Adderall XR from 10mg to 15mg daily. Stop Adderall 5mg in the afternoon.      Follow up with new [facility] psychiatrist        -------------------------------------------------------------------------------    Candi Bruner  [facility] - PSYCHIATRY    Today's encounter took a total time of 30 minutes, including patient interview and documentation    Due to the pandemic, today's session was performed over video. Patient is  confirmed to be in the [facility]. The participants are patient and myself.     Risks, Benefits, Side Effects and Alternatives were discussed with the patient today and consent was obtained for the current medication regimen. The patient was encouraged to ask questions and these questions were answered and the patient was engaged in psychoeducation regarding diagnosis, course of illness, accuracy of the diagnosis, and other general elements regarding overall diagnosis and treatment consideration.     Any medications being used off-label were discussed with the patient inclusive of the evidence base for the use of the medications and consent was obtained for the off-label use of the medication.     Brief suicide risk assessment was performed with the patient today and safety planning was performed if indicated.    Note completed by: Candi Bruner MD, 6/5/2023 1:56 PM

## 2023-06-05 ENCOUNTER — OFFICE VISIT (OUTPATIENT)
Dept: PSYCHIATRY | Facility: CLINIC | Age: 33
End: 2023-06-05
Payer: COMMERCIAL

## 2023-06-05 VITALS — HEART RATE: 78 BPM | BODY MASS INDEX: 33.25 KG/M2 | WEIGHT: 206 LBS

## 2023-06-05 DIAGNOSIS — F90.2 ADHD (ATTENTION DEFICIT HYPERACTIVITY DISORDER), COMBINED TYPE: ICD-10-CM

## 2023-06-05 PROCEDURE — 1160F PR REVIEW ALL MEDS BY PRESCRIBER/CLIN PHARMACIST DOCUMENTED: ICD-10-PCS | Mod: CPTII,95,, | Performed by: PSYCHIATRY & NEUROLOGY

## 2023-06-05 PROCEDURE — 1159F PR MEDICATION LIST DOCUMENTED IN MEDICAL RECORD: ICD-10-PCS | Mod: CPTII,95,, | Performed by: PSYCHIATRY & NEUROLOGY

## 2023-06-05 PROCEDURE — 1159F MED LIST DOCD IN RCRD: CPT | Mod: CPTII,95,, | Performed by: PSYCHIATRY & NEUROLOGY

## 2023-06-05 PROCEDURE — 99214 OFFICE O/P EST MOD 30 MIN: CPT | Mod: 95,,, | Performed by: PSYCHIATRY & NEUROLOGY

## 2023-06-05 PROCEDURE — 3008F BODY MASS INDEX DOCD: CPT | Mod: CPTII,95,, | Performed by: PSYCHIATRY & NEUROLOGY

## 2023-06-05 PROCEDURE — 99214 PR OFFICE/OUTPT VISIT, EST, LEVL IV, 30-39 MIN: ICD-10-PCS | Mod: 95,,, | Performed by: PSYCHIATRY & NEUROLOGY

## 2023-06-05 PROCEDURE — 3008F PR BODY MASS INDEX (BMI) DOCUMENTED: ICD-10-PCS | Mod: CPTII,95,, | Performed by: PSYCHIATRY & NEUROLOGY

## 2023-06-05 PROCEDURE — 1160F RVW MEDS BY RX/DR IN RCRD: CPT | Mod: CPTII,95,, | Performed by: PSYCHIATRY & NEUROLOGY

## 2023-06-05 RX ORDER — DEXTROAMPHETAMINE SACCHARATE, AMPHETAMINE ASPARTATE MONOHYDRATE, DEXTROAMPHETAMINE SULFATE AND AMPHETAMINE SULFATE 3.75; 3.75; 3.75; 3.75 MG/1; MG/1; MG/1; MG/1
15 CAPSULE, EXTENDED RELEASE ORAL EVERY MORNING
Qty: 30 CAPSULE | Refills: 0 | Status: SHIPPED | OUTPATIENT
Start: 2023-07-06 | End: 2023-10-31

## 2023-06-05 RX ORDER — DEXTROAMPHETAMINE SACCHARATE, AMPHETAMINE ASPARTATE MONOHYDRATE, DEXTROAMPHETAMINE SULFATE AND AMPHETAMINE SULFATE 3.75; 3.75; 3.75; 3.75 MG/1; MG/1; MG/1; MG/1
15 CAPSULE, EXTENDED RELEASE ORAL EVERY MORNING
Qty: 30 CAPSULE | Refills: 0 | Status: SHIPPED | OUTPATIENT
Start: 2023-08-08 | End: 2023-10-31

## 2023-06-05 RX ORDER — DEXTROAMPHETAMINE SACCHARATE, AMPHETAMINE ASPARTATE MONOHYDRATE, DEXTROAMPHETAMINE SULFATE AND AMPHETAMINE SULFATE 3.75; 3.75; 3.75; 3.75 MG/1; MG/1; MG/1; MG/1
15 CAPSULE, EXTENDED RELEASE ORAL EVERY MORNING
Qty: 30 CAPSULE | Refills: 0 | Status: SHIPPED | OUTPATIENT
Start: 2023-06-05 | End: 2023-10-31

## 2023-06-05 NOTE — PATIENT INSTRUCTIONS
Call [phone] for therapy and psychiatry.   Increase Adderall XR from 10mg to 15mg daily. Stop Adderall 5mg in the afternoon.      Follow up with new [facility] psychiatrist

## 2023-06-09 ENCOUNTER — OFFICE VISIT (OUTPATIENT)
Dept: OBSTETRICS AND GYNECOLOGY | Facility: CLINIC | Age: 33
End: 2023-06-09
Attending: OBSTETRICS & GYNECOLOGY
Payer: COMMERCIAL

## 2023-06-09 VITALS
BODY MASS INDEX: 33.06 KG/M2 | WEIGHT: 205.69 LBS | SYSTOLIC BLOOD PRESSURE: 100 MMHG | HEIGHT: 66 IN | DIASTOLIC BLOOD PRESSURE: 62 MMHG

## 2023-06-09 DIAGNOSIS — Z30.431 ENCOUNTER FOR ROUTINE CHECKING OF INTRAUTERINE CONTRACEPTIVE DEVICE (IUD): Primary | ICD-10-CM

## 2023-06-09 PROCEDURE — 3074F SYST BP LT 130 MM HG: CPT | Mod: CPTII,S$GLB,, | Performed by: OBSTETRICS & GYNECOLOGY

## 2023-06-09 PROCEDURE — 1159F PR MEDICATION LIST DOCUMENTED IN MEDICAL RECORD: ICD-10-PCS | Mod: CPTII,S$GLB,, | Performed by: OBSTETRICS & GYNECOLOGY

## 2023-06-09 PROCEDURE — 1160F RVW MEDS BY RX/DR IN RCRD: CPT | Mod: CPTII,S$GLB,, | Performed by: OBSTETRICS & GYNECOLOGY

## 2023-06-09 PROCEDURE — 3078F PR MOST RECENT DIASTOLIC BLOOD PRESSURE < 80 MM HG: ICD-10-PCS | Mod: CPTII,S$GLB,, | Performed by: OBSTETRICS & GYNECOLOGY

## 2023-06-09 PROCEDURE — 3008F BODY MASS INDEX DOCD: CPT | Mod: CPTII,S$GLB,, | Performed by: OBSTETRICS & GYNECOLOGY

## 2023-06-09 PROCEDURE — 1159F MED LIST DOCD IN RCRD: CPT | Mod: CPTII,S$GLB,, | Performed by: OBSTETRICS & GYNECOLOGY

## 2023-06-09 PROCEDURE — 3008F PR BODY MASS INDEX (BMI) DOCUMENTED: ICD-10-PCS | Mod: CPTII,S$GLB,, | Performed by: OBSTETRICS & GYNECOLOGY

## 2023-06-09 PROCEDURE — 99212 OFFICE O/P EST SF 10 MIN: CPT | Mod: S$GLB,,, | Performed by: OBSTETRICS & GYNECOLOGY

## 2023-06-09 PROCEDURE — 3078F DIAST BP <80 MM HG: CPT | Mod: CPTII,S$GLB,, | Performed by: OBSTETRICS & GYNECOLOGY

## 2023-06-09 PROCEDURE — 3074F PR MOST RECENT SYSTOLIC BLOOD PRESSURE < 130 MM HG: ICD-10-PCS | Mod: CPTII,S$GLB,, | Performed by: OBSTETRICS & GYNECOLOGY

## 2023-06-09 PROCEDURE — 99999 PR PBB SHADOW E&M-EST. PATIENT-LVL III: CPT | Mod: PBBFAC,,, | Performed by: OBSTETRICS & GYNECOLOGY

## 2023-06-09 PROCEDURE — 1160F PR REVIEW ALL MEDS BY PRESCRIBER/CLIN PHARMACIST DOCUMENTED: ICD-10-PCS | Mod: CPTII,S$GLB,, | Performed by: OBSTETRICS & GYNECOLOGY

## 2023-06-09 PROCEDURE — 99212 PR OFFICE/OUTPT VISIT, EST, LEVL II, 10-19 MIN: ICD-10-PCS | Mod: S$GLB,,, | Performed by: OBSTETRICS & GYNECOLOGY

## 2023-06-09 PROCEDURE — 99999 PR PBB SHADOW E&M-EST. PATIENT-LVL III: ICD-10-PCS | Mod: PBBFAC,,, | Performed by: OBSTETRICS & GYNECOLOGY

## 2023-06-09 NOTE — PROGRESS NOTES
"CC: IUD check    HPI:  Patsy Rosenthal is a 32 y.o. female  presents for IUD check.  Patient had a Mirena placed on 23.  After placement of the IUD she had light bleeding / spotting for several weeks.  Now, she is not having problems with bleeding, cramping, fever or discharge.  She reports that her baby, who is now one, recently had several seizures with a negative work-up.    Blood pressure 100/62, height 5' 6" (1.676 m), weight 93.3 kg (205 lb 11 oz), currently breastfeeding.    PHYSICAL EXAM:   Abdomen:  Soft, non-tender, non-distended  Vulva:  No lesions  Vaginal:  No lesions, no abnormal discharge  Cervix: No discharge, no CMT, IUD strings visible at os.  Uterus:  Normal size, non-tender  Adnexa:  No masses, non-tender    ASSESSMENT AND PLAN:  1. Encounter for routine checking of intrauterine contraceptive device (IUD)            Patient counseled on IUD precautions, warnings, danger signs.    Return for annual GYN exam    I spent a total of 16 minutes on the day of the visit.This includes face to face time and non-face to face time preparing to see the patient (eg, review of tests), Obtaining and/or reviewing separately obtained history, Documenting clinical information in the electronic or other health record, Independently interpreting resultsand communicating results to the patient/family/caregiver, or Care coordination.    This note was created with voice recognition software.  Grammatical, syntax and spelling errors may be inevitable.                    "

## 2023-07-06 DIAGNOSIS — F90.2 ADHD (ATTENTION DEFICIT HYPERACTIVITY DISORDER), COMBINED TYPE: ICD-10-CM

## 2023-07-10 ENCOUNTER — PATIENT MESSAGE (OUTPATIENT)
Dept: PSYCHIATRY | Facility: CLINIC | Age: 33
End: 2023-07-10
Payer: COMMERCIAL

## 2023-08-01 RX ORDER — DEXTROAMPHETAMINE SACCHARATE, AMPHETAMINE ASPARTATE MONOHYDRATE, DEXTROAMPHETAMINE SULFATE AND AMPHETAMINE SULFATE 3.75; 3.75; 3.75; 3.75 MG/1; MG/1; MG/1; MG/1
15 CAPSULE, EXTENDED RELEASE ORAL EVERY MORNING
Qty: 30 CAPSULE | Refills: 0 | OUTPATIENT
Start: 2023-08-01

## 2023-08-08 ENCOUNTER — OFFICE VISIT (OUTPATIENT)
Dept: PSYCHIATRY | Facility: CLINIC | Age: 33
End: 2023-08-08
Payer: COMMERCIAL

## 2023-08-08 VITALS
SYSTOLIC BLOOD PRESSURE: 111 MMHG | WEIGHT: 203.5 LBS | HEART RATE: 62 BPM | DIASTOLIC BLOOD PRESSURE: 57 MMHG | BODY MASS INDEX: 32.84 KG/M2

## 2023-08-08 DIAGNOSIS — F90.2 ADHD (ATTENTION DEFICIT HYPERACTIVITY DISORDER), COMBINED TYPE: Primary | ICD-10-CM

## 2023-08-08 PROCEDURE — 3078F DIAST BP <80 MM HG: CPT | Mod: CPTII,S$GLB,, | Performed by: STUDENT IN AN ORGANIZED HEALTH CARE EDUCATION/TRAINING PROGRAM

## 2023-08-08 PROCEDURE — 3074F PR MOST RECENT SYSTOLIC BLOOD PRESSURE < 130 MM HG: ICD-10-PCS | Mod: CPTII,S$GLB,, | Performed by: STUDENT IN AN ORGANIZED HEALTH CARE EDUCATION/TRAINING PROGRAM

## 2023-08-08 PROCEDURE — 99215 PR OFFICE/OUTPT VISIT, EST, LEVL V, 40-54 MIN: ICD-10-PCS | Mod: S$GLB,,, | Performed by: STUDENT IN AN ORGANIZED HEALTH CARE EDUCATION/TRAINING PROGRAM

## 2023-08-08 PROCEDURE — 3078F PR MOST RECENT DIASTOLIC BLOOD PRESSURE < 80 MM HG: ICD-10-PCS | Mod: CPTII,S$GLB,, | Performed by: STUDENT IN AN ORGANIZED HEALTH CARE EDUCATION/TRAINING PROGRAM

## 2023-08-08 PROCEDURE — 99999 PR PBB SHADOW E&M-EST. PATIENT-LVL I: CPT | Mod: PBBFAC,,, | Performed by: STUDENT IN AN ORGANIZED HEALTH CARE EDUCATION/TRAINING PROGRAM

## 2023-08-08 PROCEDURE — 3008F BODY MASS INDEX DOCD: CPT | Mod: CPTII,S$GLB,, | Performed by: STUDENT IN AN ORGANIZED HEALTH CARE EDUCATION/TRAINING PROGRAM

## 2023-08-08 PROCEDURE — 99999 PR PBB SHADOW E&M-EST. PATIENT-LVL I: ICD-10-PCS | Mod: PBBFAC,,, | Performed by: STUDENT IN AN ORGANIZED HEALTH CARE EDUCATION/TRAINING PROGRAM

## 2023-08-08 PROCEDURE — 99215 OFFICE O/P EST HI 40 MIN: CPT | Mod: S$GLB,,, | Performed by: STUDENT IN AN ORGANIZED HEALTH CARE EDUCATION/TRAINING PROGRAM

## 2023-08-08 PROCEDURE — 3008F PR BODY MASS INDEX (BMI) DOCUMENTED: ICD-10-PCS | Mod: CPTII,S$GLB,, | Performed by: STUDENT IN AN ORGANIZED HEALTH CARE EDUCATION/TRAINING PROGRAM

## 2023-08-08 PROCEDURE — 3074F SYST BP LT 130 MM HG: CPT | Mod: CPTII,S$GLB,, | Performed by: STUDENT IN AN ORGANIZED HEALTH CARE EDUCATION/TRAINING PROGRAM

## 2023-08-08 RX ORDER — AMPHETAMINE 10 MG/1
10 TABLET, EXTENDED RELEASE ORAL DAILY
Qty: 30 TABLET | Refills: 0 | Status: SHIPPED | OUTPATIENT
Start: 2023-08-08 | End: 2023-11-22

## 2023-08-08 RX ORDER — DEXTROAMPHETAMINE SACCHARATE, AMPHETAMINE ASPARTATE, DEXTROAMPHETAMINE SULFATE AND AMPHETAMINE SULFATE 1.25; 1.25; 1.25; 1.25 MG/1; MG/1; MG/1; MG/1
5 TABLET ORAL DAILY PRN
Qty: 30 TABLET | Refills: 0 | Status: SHIPPED | OUTPATIENT
Start: 2023-08-08 | End: 2023-09-13 | Stop reason: SDUPTHER

## 2023-08-08 NOTE — PROGRESS NOTES
Outpatient Psychiatry Initial Visit (MD/NP)    8/8/2023    Patsy Rosenthal, a 32 y.o. female, presenting for initial evaluation visit. Met with patient.    Reason for Encounter:  transition from Dr. Bruner*. Patient complains of attention deficit.    History of Present Illness:   Spoke to patient this morning, states that previous psychiatrist left [facility]. Main thing has been continuing medications for ADHD, has noticed that it has been very important for school, job, and being functional with the stress.    Thinking whether she needs increased dosage of adderall. Thinks she is not having enough coverage throughout the day given schoolwork. At [employer] program. Takes around 7-8AM, finds herself completely unable to focus around 2-4PM. Typically needs to work until around 9-5PM for work. Has been putting off all work into Saturdays due to inability to focus when she gets home.     No issues falling asleep so far, typically around 10-11PM; lately around midnight. No chest pain, tightness, heart racing. Doing okay with mood and anxiety while on medications normally, though some anxiety d/t son having seizures.     History:     Allergies:  Patient has no known allergies.    Past Medical/Surgical History:  Past Medical History:   Diagnosis Date    Depression     Postpartum anxiety 9/5/2022     Past Surgical History:   Procedure Laterality Date    wisdom tooth removal       History obtained from chart review and updated where appropriate.    Past Psychiatric History:  Previous Medication Trials:  wellbutrin 2015 - stopped d/t migraine, concerta, vyvanse    Previous Psychiatric Hospitalizations: no   Previous Suicide Attempts: no   History of Violence: no  Outpatient Psychiatrist:  Dr. Suma Jansen from [phone], Dr. Alison Ryan [phone], Dr. Bruner [phone]  Outpatient Therapist:  none currently, previously for 3y  Family History: sister with ?PMDD, ?ADHD; mother with likely ADHD    Social  History:  Marital Status:   Children: 1   Employment Status/Info:  works at [email or web address]  Education: post college graduate work or degree  Special Ed: yes  Housing Status: son, , sister Gena & her boyfriend, 2 dogs  History of phys/sexual abuse: no  Access to gun: no    Substance Abuse History:  Recreational Drugs:  denies  Use of Alcohol:  minimal currently  Rehab History: no   Tobacco Use: no  Use of OTC: no    Legal History:  Past Charges/Incarcerations:  previously for shoplifting at 18    Pending charges: no     Psychosocial Stressors: family    Review Of Systems:     Pertinent items are noted in HPI.    Current Evaluation:     Nutritional Screening: Considering the patient's height and weight, medications, medical history and preferences, should a referral be made to the dietitian? no    Constitutional  Vitals:  Most recent vital signs, dated less than 90 days prior to this appointment, were reviewed.    There were no vitals filed for this visit.     General:  unremarkable, age appropriate     Musculoskeletal  Muscle Strength/Tone:  not examined   Gait & Station:  non-ataxic     Psychiatric  Speech:  no latency; no press   Mood & Affect:  euthymic  congruent and appropriate   Thought Process:  normal and logical   Associations:  intact   Thought Content:  no suicidality, no homicidality, delusions, or paranoia   Insight:  intact, has awareness of illness   Judgement: behavior is adequate to circumstances   Orientation:  grossly intact   Memory: intact for content of interview   Language: grossly intact   Attention Span & Concentration:  able to focus   Fund of Knowledge:  intact and appropriate to age and level of education       Functioning in Relationships:  Spouse/partner: good  Employers: good    Laboratory Data  No visits with results within 1 Month(s) from this visit.   Latest known visit with results is:   Procedure visit on 05/12/2023   Component Date Value Ref Range  Status    Chlamydia, Amplified DNA 05/12/2023 Not Detected  Not Detected Final    N gonorrhoeae, amplified DNA 05/12/2023 Not Detected  Not Detected Final         Medications  Outpatient Encounter Medications as of 8/8/2023   Medication Sig Dispense Refill    amphetamine (DYANAVEL XR) 10 mg TBph Take 10 mg by mouth once daily. 30 tablet 0    dextroamphetamine-amphetamine (ADDERALL XR) 15 MG 24 hr capsule Take 1 capsule (15 mg total) by mouth every morning. 30 capsule 0    dextroamphetamine-amphetamine (ADDERALL XR) 15 MG 24 hr capsule Take 1 capsule (15 mg total) by mouth every morning. 30 capsule 0    dextroamphetamine-amphetamine (ADDERALL XR) 15 MG 24 hr capsule Take 1 capsule (15 mg total) by mouth every morning. 30 capsule 0    dextroamphetamine-amphetamine (ADDERALL) 5 mg Tab Take 1 tablet (5 mg) by mouth daily as needed. 30 tablet 0    prenatal 25/iron fum/folic/dha (PRENATAL-1 ORAL) Take by mouth.       No facility-administered encounter medications on file as of 8/8/2023.         Assessment - Diagnosis - Goals:     Patsy Rosenthal, a 32 y.o. female, presenting for initial evaluation visit.     Impression:      ICD-10-CM ICD-9-CM   1. ADHD (attention deficit hyperactivity disorder), combined type  F90.2 314.01   Postpartum anxiety, in remission    Strengths and Liabilities: Strength: Patient accepts guidance/feedback, Strength: Patient is expressive/articulate., Strength: Patient is intelligent.    Treatment Goals:  Specify outcomes written in observable, behavioral terms:   Attention deficit: adequate coverage for attention throughout workday and during study periods    Treatment Plan/Recommendations:   Medication Management: The risks and benefits of medication were discussed with the patient. Discontinue adderall 15mg XR, switch to dyanavel 10mg daily, adderall IR 5mg PRN daily  The treatment plan and follow up plan were reviewed with the patient.   reviewed: no discrepancies noted    Discussed with  patient informed consent including diagnosis, risks and benefits of proposed treatment above vs. alternative treatments vs. no treatment, as well as serious and common side effects of these treatments, and the inherent unpredictability of individual responses to these treatments. The patient expresses understanding of the above and displays the capacity to agree with this current plan. Patient also agrees that, currently, the benefits outweigh the risks and would like to pursue treatment at this time, and had no other questions.    Instructions:  Take all medications as prescribed.    Abstain from recreational drugs and alcohol.  Present to ED or call 911 for SI/HI plan or intent, psychosis, or medical emergency.    Return to Clinic: 1 month    Total time: 47m  Consulting clinician was informed of the encounter and consult note.    Case to be discussed with supervising staff, Dr. Angie MD.    Thomas Vogel MD  [facility] Psychiatry PGY-III

## 2023-08-09 NOTE — PROGRESS NOTES
I have reviewed the notes, assessments, and/or procedures performed by Dr. Vogel, I concur with her/his documentation of Patsy Rosenthal.

## 2023-08-22 ENCOUNTER — PATIENT MESSAGE (OUTPATIENT)
Dept: PSYCHIATRY | Facility: CLINIC | Age: 33
End: 2023-08-22
Payer: COMMERCIAL

## 2023-09-01 DIAGNOSIS — Z84.81 FAMILY HISTORY OF GENETIC DISEASE CARRIER: Primary | ICD-10-CM

## 2023-09-11 RX ORDER — DEXTROAMPHETAMINE SACCHARATE, AMPHETAMINE ASPARTATE, DEXTROAMPHETAMINE SULFATE AND AMPHETAMINE SULFATE 1.25; 1.25; 1.25; 1.25 MG/1; MG/1; MG/1; MG/1
5 TABLET ORAL DAILY PRN
Qty: 30 TABLET | Refills: 0 | Status: CANCELLED | OUTPATIENT
Start: 2023-09-11

## 2023-09-11 RX ORDER — AMPHETAMINE 10 MG/1
10 TABLET, EXTENDED RELEASE ORAL DAILY
Qty: 30 TABLET | Refills: 0 | Status: CANCELLED | OUTPATIENT
Start: 2023-09-11

## 2023-09-13 ENCOUNTER — OFFICE VISIT (OUTPATIENT)
Dept: PSYCHIATRY | Facility: CLINIC | Age: 33
End: 2023-09-13
Payer: COMMERCIAL

## 2023-09-13 DIAGNOSIS — F90.2 ADHD (ATTENTION DEFICIT HYPERACTIVITY DISORDER), COMBINED TYPE: Primary | ICD-10-CM

## 2023-09-13 DIAGNOSIS — F32.81 PMDD (PREMENSTRUAL DYSPHORIC DISORDER): ICD-10-CM

## 2023-09-13 PROCEDURE — 99213 PR OFFICE/OUTPT VISIT, EST, LEVL III, 20-29 MIN: ICD-10-PCS | Mod: 95,,, | Performed by: STUDENT IN AN ORGANIZED HEALTH CARE EDUCATION/TRAINING PROGRAM

## 2023-09-13 PROCEDURE — 99213 OFFICE O/P EST LOW 20 MIN: CPT | Mod: 95,,, | Performed by: STUDENT IN AN ORGANIZED HEALTH CARE EDUCATION/TRAINING PROGRAM

## 2023-09-13 RX ORDER — DEXTROAMPHETAMINE SACCHARATE, AMPHETAMINE ASPARTATE, DEXTROAMPHETAMINE SULFATE AND AMPHETAMINE SULFATE 1.25; 1.25; 1.25; 1.25 MG/1; MG/1; MG/1; MG/1
5 TABLET ORAL DAILY PRN
Qty: 30 TABLET | Refills: 0 | Status: SHIPPED | OUTPATIENT
Start: 2023-10-11 | End: 2023-10-31

## 2023-09-13 RX ORDER — AMPHETAMINE 15 MG/1
15 TABLET, EXTENDED RELEASE ORAL DAILY
Qty: 30 TABLET | Refills: 0 | Status: SHIPPED | OUTPATIENT
Start: 2023-09-13 | End: 2023-10-31

## 2023-09-13 RX ORDER — DEXTROAMPHETAMINE SACCHARATE, AMPHETAMINE ASPARTATE, DEXTROAMPHETAMINE SULFATE AND AMPHETAMINE SULFATE 1.25; 1.25; 1.25; 1.25 MG/1; MG/1; MG/1; MG/1
5 TABLET ORAL DAILY PRN
Qty: 30 TABLET | Refills: 0 | Status: SHIPPED | OUTPATIENT
Start: 2023-09-13 | End: 2023-10-31

## 2023-09-13 RX ORDER — AMPHETAMINE 15 MG/1
15 TABLET, EXTENDED RELEASE ORAL DAILY
Qty: 30 TABLET | Refills: 0 | Status: SHIPPED | OUTPATIENT
Start: 2023-10-11 | End: 2023-10-31

## 2023-09-13 RX ORDER — FLUOXETINE 10 MG/1
CAPSULE ORAL
Qty: 30 CAPSULE | Refills: 2 | Status: SHIPPED | OUTPATIENT
Start: 2023-09-13 | End: 2023-12-21 | Stop reason: SDUPTHER

## 2023-09-13 NOTE — PROGRESS NOTES
Outpatient Psychiatry Follow-Up Visit (MD/NP)    9/13/2023    Clinical Status of Patient:  Outpatient (Ambulatory)    The patient location is: outside in private vehicle in parking lot outside job  The chief complaint leading to consultation is:     Visit type: audiovisual    Face to Face time with patient: 20 minutes  28 minutes of total time spent on the encounter, which includes face to face time and non-face to face time preparing to see the patient (eg, review of tests), Obtaining and/or reviewing separately obtained history, Documenting clinical information in the electronic or other health record, Independently interpreting results (not separately reported) and communicating results to the patient/family/caregiver, or Care coordination (not separately reported).     Each patient to whom he or she provides medical services by telemedicine is:  (1) informed of the relationship between the physician and patient and the respective role of any other health care provider with respect to management of the patient; and (2) notified that he or she may decline to receive medical services by telemedicine and may withdraw from such care at any time.    Notes:       Chief Complaint:  Patsy Rosenthal is a 32 y.o. female who presents today for follow-up of attention problems. Met with patient.      Interval History and Content of Current Session:  Interim Events/Subjective Report/Content of Current Session:   Dyanavel did not seem to last as long as expected, had a week where it didn't seem to work well. Had some spotting despite having IUD. Noticed that it didn't work as well during that time. Takes 5mg IR around 3:30-4:00PM. Does feel dyanavel was more effective than adderall XR overall. Aware of certain days where she is mentally exhausted where it doesn't seem as helpful. Does still have some challenge in terms of execution around the home, I.e. cleaning her room. Endorses added stress with  and son, though got  some good news (health issues - son's epilepsy, 's back injury doesn't need surgery). Doing well with schoolwork on the dyanavel.    Discussed that periods while having IUD unlikely to be related to medication. Got it in May, cycles have been inconsistent as a result. Feels mental exhaustion with some depression/feeling down, low energy during this time.    No chest pain, tightness, heart racing. Appetite and sleep okay. Gets about 7-7.5 hours of sleep.  Mood and anxiety stable.     Psychiatric Review of Systems-is patient experiencing or having changes in  sleep: no  appetite: no  weight: no  energy/anergy: no  anhedonia: no  libido: did not assess  anxiety: no  guilty/hopelessness: no  concentration: yes  Irritability: no  Paranoia/delusions: no  S.I.B.s/risky behavior: no    Review of Systems   PSYCHIATRIC: Pertinant items are noted in the narrative.  CONSTITUTIONAL: No weight gain or loss.  MUSCULOSKELETAL: No pain or stiffness of the joints.  NEUROLOGIC: No weakness, sensory changes, seizures, confusion, memory loss, tremor or other abnormal movements.  RESPIRATORY: No shortness of breath.  CARDIOVASCULAR: No tachycardia or chest pain.  GASTROINTESTINAL: No nausea, vomiting, pain, constipation or diarrhea.    Past Medical, Family and Social History: The patient's past medical, family and social history have been reviewed and updated as appropriate within the electronic medical record - see encounter notes.    Compliance: yes    Side effects: None    Risk Parameters:  Patient reports no suicidal ideation  Patient reports no homicidal ideation  Patient reports no self-injurious behavior  Patient reports no violent behavior    Exam (detailed: at least 9 elements; comprehensive: all 15 elements)   Constitutional  Vitals:  Most recent vital signs, dated less than 90 days prior to this appointment, were reviewed.   There were no vitals filed for this visit.     General:  unremarkable, age appropriate      Musculoskeletal  Muscle Strength/Tone:  not examined   Gait & Station:  non-ataxic     Psychiatric  Speech:  no latency; no press   Mood & Affect:  euthymic  congruent and appropriate   Thought Process:  normal and logical   Associations:  intact   Thought Content:  no suicidality, no homicidality, delusions, or paranoia   Insight:  intact, has awareness of illness   Judgement: behavior is adequate to circumstances   Orientation:  grossly intact   Memory: intact for content of interview   Language: grossly intact   Attention Span & Concentration:  able to focus   Fund of Knowledge:  intact and appropriate to age and level of education     Assessment and Diagnosis   Status/Progress: Based on the examination today, the patient's problem(s) is/are improved.  New problems have been presented today.   Lack of compliance are not complicating management of the primary condition.  There are no active rule-out diagnoses for this patient at this time.     General Impression:    ICD-10-CM ICD-9-CM   1. ADHD (attention deficit hyperactivity disorder), combined type  F90.2 314.01   2. PMDD (premenstrual dysphoric disorder)  F32.81 625.4       Intervention/Counseling/Treatment Plan   Medication Management: The risks and benefits of medication were discussed with the patient.    Increase dyanavel to 15mg daily  Continue adderall IR 5mg daily PRN  Start prozac 10mg for PMDD - may take as luteal phase regimen or symptom-onset regimen    Discussed with patient informed consent including diagnosis, risks and benefits of proposed treatment above vs. alternative treatments vs. no treatment, as well as serious and common side effects of these treatments, and the inherent unpredictability of individual responses to these treatments. The patient expresses understanding of the above and displays the capacity to agree with this current plan. Patient also agrees that, currently, the benefits outweigh the risks and would like to pursue  treatment at this time, and had no other questions.    Instructions:  Take all medications as prescribed.    Abstain from recreational drugs and alcohol.  Present to ED or call 911 for SI/HI plan or intent, psychosis, or medical emergency.    Return to Clinic: 2 months    Case to be discussed with supervising staff, Dr. Angie MD.    Thomas Vogel MD  [facility] Psychiatry PGY-III

## 2023-10-17 ENCOUNTER — PATIENT MESSAGE (OUTPATIENT)
Dept: PSYCHIATRY | Facility: CLINIC | Age: 33
End: 2023-10-17
Payer: COMMERCIAL

## 2023-10-25 RX ORDER — LISDEXAMFETAMINE DIMESYLATE 30 MG/1
30 CAPSULE ORAL EVERY MORNING
Qty: 30 CAPSULE | Refills: 0 | Status: SHIPPED | OUTPATIENT
Start: 2023-10-25 | End: 2023-11-13 | Stop reason: SDUPTHER

## 2023-10-31 ENCOUNTER — OFFICE VISIT (OUTPATIENT)
Dept: PSYCHIATRY | Facility: CLINIC | Age: 33
End: 2023-10-31
Payer: COMMERCIAL

## 2023-10-31 DIAGNOSIS — F90.2 ADHD (ATTENTION DEFICIT HYPERACTIVITY DISORDER), COMBINED TYPE: ICD-10-CM

## 2023-10-31 DIAGNOSIS — F32.81 PMDD (PREMENSTRUAL DYSPHORIC DISORDER): Primary | ICD-10-CM

## 2023-10-31 PROCEDURE — 99213 OFFICE O/P EST LOW 20 MIN: CPT | Mod: 95,,, | Performed by: STUDENT IN AN ORGANIZED HEALTH CARE EDUCATION/TRAINING PROGRAM

## 2023-10-31 PROCEDURE — 99213 PR OFFICE/OUTPT VISIT, EST, LEVL III, 20-29 MIN: ICD-10-PCS | Mod: 95,,, | Performed by: STUDENT IN AN ORGANIZED HEALTH CARE EDUCATION/TRAINING PROGRAM

## 2023-10-31 RX ORDER — DEXTROAMPHETAMINE SACCHARATE, AMPHETAMINE ASPARTATE, DEXTROAMPHETAMINE SULFATE AND AMPHETAMINE SULFATE 1.25; 1.25; 1.25; 1.25 MG/1; MG/1; MG/1; MG/1
5 TABLET ORAL DAILY PRN
Qty: 30 TABLET | Refills: 0 | Status: SHIPPED | OUTPATIENT
Start: 2023-12-08 | End: 2024-01-16

## 2023-10-31 RX ORDER — DEXTROAMPHETAMINE SACCHARATE, AMPHETAMINE ASPARTATE, DEXTROAMPHETAMINE SULFATE AND AMPHETAMINE SULFATE 1.25; 1.25; 1.25; 1.25 MG/1; MG/1; MG/1; MG/1
5 TABLET ORAL DAILY PRN
Qty: 30 TABLET | Refills: 0 | Status: SHIPPED | OUTPATIENT
Start: 2023-11-10 | End: 2023-12-21 | Stop reason: SDUPTHER

## 2023-10-31 RX ORDER — AMPHETAMINE 15 MG/1
15 TABLET, EXTENDED RELEASE ORAL DAILY
Qty: 30 TABLET | Refills: 0 | Status: SHIPPED | OUTPATIENT
Start: 2023-12-12 | End: 2023-11-22

## 2023-10-31 RX ORDER — AMPHETAMINE 15 MG/1
15 TABLET, EXTENDED RELEASE ORAL DAILY
Qty: 30 TABLET | Refills: 0 | Status: SHIPPED | OUTPATIENT
Start: 2023-11-14 | End: 2023-11-22

## 2023-10-31 RX ORDER — AMPHETAMINE 15 MG/1
15 TABLET, EXTENDED RELEASE ORAL DAILY
Qty: 30 TABLET | Refills: 0 | Status: SHIPPED | OUTPATIENT
Start: 2024-01-09 | End: 2023-11-22

## 2023-10-31 RX ORDER — DEXTROAMPHETAMINE SACCHARATE, AMPHETAMINE ASPARTATE, DEXTROAMPHETAMINE SULFATE AND AMPHETAMINE SULFATE 1.25; 1.25; 1.25; 1.25 MG/1; MG/1; MG/1; MG/1
5 TABLET ORAL DAILY PRN
Qty: 30 TABLET | Refills: 0 | Status: SHIPPED | OUTPATIENT
Start: 2024-01-05 | End: 2024-01-16

## 2023-10-31 NOTE — PROGRESS NOTES
STAFF NOTE    The chart was reviewed and the case was discussed, including the assessment and management plan.  I agree with the overall findings, with corrections or clarifications, if any, noted herein.    **    Kian Adams MD  Board Certification: Psychiatry and Addiction Medicine

## 2023-10-31 NOTE — PROGRESS NOTES
Outpatient Psychiatry Follow-Up Visit (MD/NP)    10/31/2023    Clinical Status of Patient:  Outpatient (Ambulatory)    The patient location is: outside in private vehicle in parking lot outside job  The chief complaint leading to consultation is:     Visit type: audiovisual    Face to Face time with patient: 9 minutes  23 minutes of total time spent on the encounter, which includes face to face time and non-face to face time preparing to see the patient (eg, review of tests), Obtaining and/or reviewing separately obtained history, Documenting clinical information in the electronic or other health record, Independently interpreting results (not separately reported) and communicating results to the patient/family/caregiver, or Care coordination (not separately reported).     Each patient to whom he or she provides medical services by telemedicine is:  (1) informed of the relationship between the physician and patient and the respective role of any other health care provider with respect to management of the patient; and (2) notified that he or she may decline to receive medical services by telemedicine and may withdraw from such care at any time.        Chief Complaint:  Patsy Rosenthal is a 33 y.o. female who presents today for follow-up of attention problems. Met with patient.      Interval History and Content of Current Session:  Interim Events/Subjective Report/Content of Current Session:   Patient has been doing well during interval. Hasn't filled vyvanse yet as she was able to get dyanavel. Since taking prozac daily with dyanavel, has been doing well on the medication. Has seemed to help a lot with the days where it seemed like the medications weren't working and coincided with cycle. Doing better with dealing with challenges with son and  since getting on prozac daily. Considering switch back to vyvanse due to out of pocket expense, but will stay on dyanavel for now given access seems better with it. No  chest pain, tightness, heart racing. Appetite and sleep okay.    Psychiatric Review of Systems-is patient experiencing or having changes in  sleep: no  appetite: no  weight: no  energy/anergy: no  anhedonia: no  libido: did not assess  anxiety: no  guilty/hopelessness: no  concentration: yes  Irritability: no  Paranoia/delusions: no  S.I.B.s/risky behavior: no    Review of Systems   PSYCHIATRIC: Pertinant items are noted in the narrative.  CONSTITUTIONAL: No weight gain or loss.  MUSCULOSKELETAL: No pain or stiffness of the joints.  NEUROLOGIC: No weakness, sensory changes, seizures, confusion, memory loss, tremor or other abnormal movements.  RESPIRATORY: No shortness of breath.  CARDIOVASCULAR: No tachycardia or chest pain.  GASTROINTESTINAL: No nausea, vomiting, pain, constipation or diarrhea.    Past Medical, Family and Social History: The patient's past medical, family and social history have been reviewed and updated as appropriate within the electronic medical record - see encounter notes.    Compliance: yes    Side effects: None    Risk Parameters:  Patient reports no suicidal ideation  Patient reports no homicidal ideation  Patient reports no self-injurious behavior  Patient reports no violent behavior    Exam (detailed: at least 9 elements; comprehensive: all 15 elements)   Constitutional  Vitals:  Most recent vital signs, dated less than 90 days prior to this appointment, were reviewed.   There were no vitals filed for this visit.     General:  unremarkable, age appropriate     Musculoskeletal  Muscle Strength/Tone:  not examined   Gait & Station:  non-ataxic     Psychiatric  Speech:  no latency; no press   Mood & Affect:  euthymic  congruent and appropriate   Thought Process:  normal and logical   Associations:  intact   Thought Content:  no suicidality, no homicidality, delusions, or paranoia   Insight:  intact, has awareness of illness   Judgement: behavior is adequate to circumstances   Orientation:   grossly intact   Memory: intact for content of interview   Language: grossly intact   Attention Span & Concentration:  able to focus   Fund of Knowledge:  intact and appropriate to age and level of education     Assessment and Diagnosis   Status/Progress: Based on the examination today, the patient's problem(s) is/are improved.  New problems have been presented today.   Lack of compliance are not complicating management of the primary condition.  There are no active rule-out diagnoses for this patient at this time.     General Impression:    ICD-10-CM ICD-9-CM   1. PMDD (premenstrual dysphoric disorder)  F32.81 625.4   2. ADHD (attention deficit hyperactivity disorder), combined type  F90.2 314.01         Intervention/Counseling/Treatment Plan   Medication Management: The risks and benefits of medication were discussed with the patient.    Continue dyanavel to 15mg daily  Continue adderall IR 5mg daily PRN  Continue prozac 10mg daily for PMDD    Discussed with patient informed consent including diagnosis, risks and benefits of proposed treatment above vs. alternative treatments vs. no treatment, as well as serious and common side effects of these treatments, and the inherent unpredictability of individual responses to these treatments. The patient expresses understanding of the above and displays the capacity to agree with this current plan. Patient also agrees that, currently, the benefits outweigh the risks and would like to pursue treatment at this time, and had no other questions.    Instructions:  Take all medications as prescribed.    Abstain from recreational drugs and alcohol.  Present to ED or call 911 for SI/HI plan or intent, psychosis, or medical emergency.    Return to Clinic: 3 months    Case to be discussed with supervising staff, Dr. Angie MD.    Thomas Vogel MD  [facility] Psychiatry PGY-III

## 2023-11-14 RX ORDER — LISDEXAMFETAMINE DIMESYLATE 30 MG/1
30 CAPSULE ORAL EVERY MORNING
Qty: 30 CAPSULE | Refills: 0 | Status: SHIPPED | OUTPATIENT
Start: 2023-11-14 | End: 2023-11-22 | Stop reason: SDUPTHER

## 2023-11-21 ENCOUNTER — PATIENT MESSAGE (OUTPATIENT)
Dept: PSYCHIATRY | Facility: CLINIC | Age: 33
End: 2023-11-21
Payer: COMMERCIAL

## 2023-11-22 DIAGNOSIS — F90.2 ADHD (ATTENTION DEFICIT HYPERACTIVITY DISORDER), COMBINED TYPE: Primary | ICD-10-CM

## 2023-11-22 RX ORDER — LISDEXAMFETAMINE DIMESYLATE 40 MG/1
40 CAPSULE ORAL DAILY
Qty: 30 CAPSULE | Refills: 0 | Status: SHIPPED | OUTPATIENT
Start: 2023-11-22 | End: 2023-12-21 | Stop reason: SDUPTHER

## 2023-11-22 RX ORDER — LISDEXAMFETAMINE DIMESYLATE 30 MG/1
30 CAPSULE ORAL EVERY MORNING
Qty: 30 CAPSULE | Refills: 0 | Status: SHIPPED | OUTPATIENT
Start: 2024-01-17 | End: 2023-11-22

## 2023-11-22 RX ORDER — LISDEXAMFETAMINE DIMESYLATE 30 MG/1
30 CAPSULE ORAL EVERY MORNING
Qty: 30 CAPSULE | Refills: 0 | Status: SHIPPED | OUTPATIENT
Start: 2023-11-22 | End: 2023-11-22

## 2023-11-22 RX ORDER — LISDEXAMFETAMINE DIMESYLATE 30 MG/1
30 CAPSULE ORAL EVERY MORNING
Qty: 30 CAPSULE | Refills: 0 | Status: SHIPPED | OUTPATIENT
Start: 2023-12-20 | End: 2023-11-22

## 2023-11-22 RX ORDER — LISDEXAMFETAMINE DIMESYLATE 40 MG/1
40 CAPSULE ORAL DAILY
Qty: 30 CAPSULE | Refills: 0 | Status: SHIPPED | OUTPATIENT
Start: 2024-01-17 | End: 2024-01-16

## 2023-11-22 RX ORDER — LISDEXAMFETAMINE DIMESYLATE 40 MG/1
40 CAPSULE ORAL DAILY
Qty: 30 CAPSULE | Refills: 0 | Status: SHIPPED | OUTPATIENT
Start: 2023-12-20 | End: 2024-01-16

## 2023-12-21 DIAGNOSIS — F32.81 PMDD (PREMENSTRUAL DYSPHORIC DISORDER): ICD-10-CM

## 2023-12-21 DIAGNOSIS — F90.2 ADHD (ATTENTION DEFICIT HYPERACTIVITY DISORDER), COMBINED TYPE: ICD-10-CM

## 2023-12-22 RX ORDER — DEXTROAMPHETAMINE SACCHARATE, AMPHETAMINE ASPARTATE, DEXTROAMPHETAMINE SULFATE AND AMPHETAMINE SULFATE 1.25; 1.25; 1.25; 1.25 MG/1; MG/1; MG/1; MG/1
5 TABLET ORAL DAILY PRN
Qty: 30 TABLET | Refills: 0 | Status: SHIPPED | OUTPATIENT
Start: 2023-12-22 | End: 2024-01-16

## 2023-12-22 RX ORDER — FLUOXETINE 10 MG/1
CAPSULE ORAL
Qty: 30 CAPSULE | Refills: 2 | Status: SHIPPED | OUTPATIENT
Start: 2023-12-22 | End: 2024-02-14 | Stop reason: SDUPTHER

## 2023-12-22 RX ORDER — LISDEXAMFETAMINE DIMESYLATE 40 MG/1
40 CAPSULE ORAL DAILY
Qty: 30 CAPSULE | Refills: 0 | Status: SHIPPED | OUTPATIENT
Start: 2023-12-22 | End: 2024-01-16

## 2024-01-16 ENCOUNTER — OFFICE VISIT (OUTPATIENT)
Dept: PSYCHIATRY | Facility: CLINIC | Age: 34
End: 2024-01-16
Payer: COMMERCIAL

## 2024-01-16 DIAGNOSIS — F32.81 PMDD (PREMENSTRUAL DYSPHORIC DISORDER): ICD-10-CM

## 2024-01-16 DIAGNOSIS — F90.2 ADHD (ATTENTION DEFICIT HYPERACTIVITY DISORDER), COMBINED TYPE: Primary | ICD-10-CM

## 2024-01-16 PROCEDURE — 99213 OFFICE O/P EST LOW 20 MIN: CPT | Mod: 95,,, | Performed by: STUDENT IN AN ORGANIZED HEALTH CARE EDUCATION/TRAINING PROGRAM

## 2024-01-16 RX ORDER — LISDEXAMFETAMINE DIMESYLATE 40 MG/1
40 CAPSULE ORAL DAILY
Qty: 30 CAPSULE | Refills: 0 | Status: SHIPPED | OUTPATIENT
Start: 2024-03-22 | End: 2024-05-15

## 2024-01-16 RX ORDER — DEXTROAMPHETAMINE SACCHARATE, AMPHETAMINE ASPARTATE, DEXTROAMPHETAMINE SULFATE AND AMPHETAMINE SULFATE 1.25; 1.25; 1.25; 1.25 MG/1; MG/1; MG/1; MG/1
5 TABLET ORAL DAILY PRN
Qty: 30 TABLET | Refills: 0 | Status: SHIPPED | OUTPATIENT
Start: 2024-02-16 | End: 2024-02-14 | Stop reason: SDUPTHER

## 2024-01-16 RX ORDER — LISDEXAMFETAMINE DIMESYLATE 40 MG/1
40 CAPSULE ORAL DAILY
Qty: 30 CAPSULE | Refills: 0 | Status: SHIPPED | OUTPATIENT
Start: 2024-01-26 | End: 2024-05-15

## 2024-01-16 RX ORDER — LISDEXAMFETAMINE DIMESYLATE 40 MG/1
40 CAPSULE ORAL DAILY
Qty: 30 CAPSULE | Refills: 0 | Status: SHIPPED | OUTPATIENT
Start: 2024-02-23 | End: 2024-05-15

## 2024-01-16 RX ORDER — DEXTROAMPHETAMINE SACCHARATE, AMPHETAMINE ASPARTATE, DEXTROAMPHETAMINE SULFATE AND AMPHETAMINE SULFATE 1.25; 1.25; 1.25; 1.25 MG/1; MG/1; MG/1; MG/1
5 TABLET ORAL DAILY PRN
Qty: 30 TABLET | Refills: 0 | Status: SHIPPED | OUTPATIENT
Start: 2024-03-15 | End: 2024-05-15

## 2024-01-16 RX ORDER — DEXTROAMPHETAMINE SACCHARATE, AMPHETAMINE ASPARTATE, DEXTROAMPHETAMINE SULFATE AND AMPHETAMINE SULFATE 1.25; 1.25; 1.25; 1.25 MG/1; MG/1; MG/1; MG/1
5 TABLET ORAL DAILY PRN
Qty: 30 TABLET | Refills: 0 | Status: SHIPPED | OUTPATIENT
Start: 2024-01-19 | End: 2024-05-15

## 2024-01-16 NOTE — PROGRESS NOTES
Outpatient Psychiatry Follow-Up Visit (MD/NP)    1/16/2024    Clinical Status of Patient:  Outpatient (Ambulatory)    The patient location is: at home  The chief complaint leading to consultation is: attention problems    Visit type: audiovisual    Face to Face time with patient: 9 minutes  19 minutes of total time spent on the encounter, which includes face to face time and non-face to face time preparing to see the patient (eg, review of tests), Obtaining and/or reviewing separately obtained history, Documenting clinical information in the electronic or other health record, Independently interpreting results (not separately reported) and communicating results to the patient/family/caregiver, or Care coordination (not separately reported).     Each patient to whom he or she provides medical services by telemedicine is:  (1) informed of the relationship between the physician and patient and the respective role of any other health care provider with respect to management of the patient; and (2) notified that he or she may decline to receive medical services by telemedicine and may withdraw from such care at any time.        Chief Complaint:  Patsy Rosenthal is a 33 y.o. female who presents today for follow-up of attention problems. Met with patient.      Interval History and Content of Current Session:  Interim Events/Subjective Report/Content of Current Session:     Doing well, has had a lot of improvement during interval.  almost done healing from back surgery and doing better. Son started on topamax and keppra for seizures, hoping he goes 3-6 months without seizure. Has been doing well. Has been taking holidays and time in between semesters for herself, not putting pressure on herself. Has been finding motivation to do things again. Medication has been working okay, generic has been a little more abrupt comedown. Has a cup of coffee midday, uses PRN adderall before 5PM. Takes prozac most days - every other  day on average. Has improved diet lately. No chest pain, tightness, heart racing. Appetite and sleep have been good.    Started school last month, main concern would be declining.     Psychiatric Review of Systems-is patient experiencing or having changes in  sleep: no  appetite: no  weight: no  energy/anergy: no  anhedonia: no  libido: did not assess  anxiety: no  guilty/hopelessness: no  concentration: yes  Irritability: no  Paranoia/delusions: no  S.I.B.s/risky behavior: no    Review of Systems   PSYCHIATRIC: Pertinant items are noted in the narrative.  CONSTITUTIONAL: No weight gain or loss.  MUSCULOSKELETAL: No pain or stiffness of the joints.  NEUROLOGIC: No weakness, sensory changes, seizures, confusion, memory loss, tremor or other abnormal movements.  RESPIRATORY: No shortness of breath.  CARDIOVASCULAR: No tachycardia or chest pain.  GASTROINTESTINAL: No nausea, vomiting, pain, constipation or diarrhea.    Past Medical, Family and Social History: The patient's past medical, family and social history have been reviewed and updated as appropriate within the electronic medical record - see encounter notes.    Compliance: yes    Side effects: None    Risk Parameters:  Patient reports no suicidal ideation  Patient reports no homicidal ideation  Patient reports no self-injurious behavior  Patient reports no violent behavior    Exam (detailed: at least 9 elements; comprehensive: all 15 elements)   Constitutional  Vitals:  Most recent vital signs, dated less than 90 days prior to this appointment, were reviewed.   There were no vitals filed for this visit.     General:  unremarkable, age appropriate     Musculoskeletal  Muscle Strength/Tone:  not examined   Gait & Station:  non-ataxic     Psychiatric  Speech:  no latency; no press   Mood & Affect:  euthymic  congruent and appropriate   Thought Process:  normal and logical   Associations:  intact   Thought Content:  no suicidality, no homicidality, delusions, or  paranoia   Insight:  intact, has awareness of illness   Judgement: behavior is adequate to circumstances   Orientation:  grossly intact   Memory: intact for content of interview   Language: grossly intact   Attention Span & Concentration:  able to focus   Fund of Knowledge:  intact and appropriate to age and level of education     Assessment and Diagnosis   Status/Progress: Based on the examination today, the patient's problem(s) is/are improved.  New problems have been presented today.   Lack of compliance are not complicating management of the primary condition.  There are no active rule-out diagnoses for this patient at this time.     General Impression:    ICD-10-CM ICD-9-CM   1. ADHD (attention deficit hyperactivity disorder), combined type  F90.2 314.01   2. PMDD (premenstrual dysphoric disorder)  F32.81 625.4       Intervention/Counseling/Treatment Plan   Medication Management: The risks and benefits of medication were discussed with the patient.    Continue vyvanse 40mg daily  Continue adderall IR 5mg daily PRN  Continue prozac 10mg daily for PMDD    Discussed with patient informed consent including diagnosis, risks and benefits of proposed treatment above vs. alternative treatments vs. no treatment, as well as serious and common side effects of these treatments, and the inherent unpredictability of individual responses to these treatments. The patient expresses understanding of the above and displays the capacity to agree with this current plan. Patient also agrees that, currently, the benefits outweigh the risks and would like to pursue treatment at this time, and had no other questions.    Instructions:  Take all medications as prescribed.    Abstain from recreational drugs and alcohol.  Present to ED or call 911 for SI/HI plan or intent, psychosis, or medical emergency.    Return to Clinic:  1-2 months    Case to be discussed with supervising staff, Dr. Angie MD.    Thomas Vogel MD  [facility]  Psychiatry PGY-III

## 2024-01-29 ENCOUNTER — PATIENT MESSAGE (OUTPATIENT)
Dept: PSYCHIATRY | Facility: CLINIC | Age: 34
End: 2024-01-29
Payer: COMMERCIAL

## 2024-02-14 ENCOUNTER — OFFICE VISIT (OUTPATIENT)
Dept: PSYCHIATRY | Facility: CLINIC | Age: 34
End: 2024-02-14
Payer: COMMERCIAL

## 2024-02-14 DIAGNOSIS — F90.2 ADHD (ATTENTION DEFICIT HYPERACTIVITY DISORDER), COMBINED TYPE: Primary | ICD-10-CM

## 2024-02-14 DIAGNOSIS — F32.81 PMDD (PREMENSTRUAL DYSPHORIC DISORDER): ICD-10-CM

## 2024-02-14 PROCEDURE — 99214 OFFICE O/P EST MOD 30 MIN: CPT | Mod: 95,,, | Performed by: STUDENT IN AN ORGANIZED HEALTH CARE EDUCATION/TRAINING PROGRAM

## 2024-02-14 RX ORDER — DEXTROAMPHETAMINE SACCHARATE, AMPHETAMINE ASPARTATE, DEXTROAMPHETAMINE SULFATE AND AMPHETAMINE SULFATE 1.25; 1.25; 1.25; 1.25 MG/1; MG/1; MG/1; MG/1
5 TABLET ORAL DAILY PRN
Qty: 30 TABLET | Refills: 0 | Status: SHIPPED | OUTPATIENT
Start: 2024-04-17 | End: 2024-05-15

## 2024-02-14 RX ORDER — FLUOXETINE 10 MG/1
CAPSULE ORAL
Qty: 30 CAPSULE | Refills: 2 | Status: SHIPPED | OUTPATIENT
Start: 2024-02-14 | End: 2024-05-15 | Stop reason: SDUPTHER

## 2024-02-14 RX ORDER — DEXTROAMPHETAMINE SACCHARATE, AMPHETAMINE ASPARTATE, DEXTROAMPHETAMINE SULFATE AND AMPHETAMINE SULFATE 1.25; 1.25; 1.25; 1.25 MG/1; MG/1; MG/1; MG/1
5 TABLET ORAL DAILY PRN
Qty: 30 TABLET | Refills: 0 | Status: SHIPPED | OUTPATIENT
Start: 2024-02-21 | End: 2024-05-15

## 2024-02-14 RX ORDER — LISDEXAMFETAMINE DIMESYLATE 40 MG/1
40 CAPSULE ORAL DAILY
Qty: 30 CAPSULE | Refills: 0 | Status: SHIPPED | OUTPATIENT
Start: 2024-02-23 | End: 2024-05-15

## 2024-02-14 RX ORDER — LISDEXAMFETAMINE DIMESYLATE 40 MG/1
40 CAPSULE ORAL DAILY
Qty: 30 CAPSULE | Refills: 0 | Status: SHIPPED | OUTPATIENT
Start: 2024-04-19 | End: 2024-05-15

## 2024-02-14 RX ORDER — LISDEXAMFETAMINE DIMESYLATE 40 MG/1
40 CAPSULE ORAL DAILY
Qty: 30 CAPSULE | Refills: 0 | Status: SHIPPED | OUTPATIENT
Start: 2024-03-22 | End: 2024-05-15

## 2024-02-14 RX ORDER — DEXTROAMPHETAMINE SACCHARATE, AMPHETAMINE ASPARTATE, DEXTROAMPHETAMINE SULFATE AND AMPHETAMINE SULFATE 1.25; 1.25; 1.25; 1.25 MG/1; MG/1; MG/1; MG/1
5 TABLET ORAL DAILY PRN
Qty: 30 TABLET | Refills: 0 | Status: SHIPPED | OUTPATIENT
Start: 2024-03-20 | End: 2024-05-15

## 2024-02-14 NOTE — PROGRESS NOTES
Outpatient Psychiatry Follow-Up Visit (MD/NP)    2/14/2024    Clinical Status of Patient:  Outpatient (Ambulatory)    The patient location is: at work  The chief complaint leading to consultation is: attention problems    Visit type: audiovisual    Face to Face time with patient: 13 minutes  23 minutes of total time spent on the encounter, which includes face to face time and non-face to face time preparing to see the patient (eg, review of tests), Obtaining and/or reviewing separately obtained history, Documenting clinical information in the electronic or other health record, Independently interpreting results (not separately reported) and communicating results to the patient/family/caregiver, or Care coordination (not separately reported).     Each patient to whom he or she provides medical services by telemedicine is:  (1) informed of the relationship between the physician and patient and the respective role of any other health care provider with respect to management of the patient; and (2) notified that he or she may decline to receive medical services by telemedicine and may withdraw from such care at any time.    Chief Complaint:  Patsy Rosenthal is a 33 y.o. female who presents today for follow-up of attention problems. Met with patient.      Interval History and Content of Current Session:  Interim Events/Subjective Report/Content of Current Session:     Doing good lately. Past few weeks have been more difficult due to difficulty with son sleeping so she has been up a lot. Son got sick, molars coming in. Around 5h of sleep nightly over the past few weeks. Started exercising, cleaning up diet - has been very helpful. Has a couple spa appointments with sister to celebrate her birthday this weekend.  will care for son all day then.  just got a great job offer that should help a lot with finances. Would like to keep medications the same lately. Went about 2w without prozac, seemed to do well  without it for a while. Did restart yesterday due to some changes with her cycle as a precautionary move.  got cleared by surgeon last week - fully recovered. No seizures for over 2 months for son, if he makes it to 3 months - would be the longest he's gone. Father-in-law has been in hospital for a while, has stage 4 cancer for years now. Better job would help support him better. Memory has been poor lately, though not sleeping well lately. Losing keys and things again lately. Still having some moments on medication where she seems to be disassociating, not focusing when somebody talking to her. Started taking adderall 5mg in the AM and starting some cardio at that time, then taking vyvanse a little later in the day. No coffee needed.    Academically doing really well - got a perfect score on machine learning algorithm for school which is very difficult.    Psychiatric Review of Systems-is patient experiencing or having changes in  sleep: no  appetite: no  weight: no  energy/anergy: no  anhedonia: no  libido: did not assess  anxiety: no  guilty/hopelessness: no  concentration: yes  Irritability: no  Paranoia/delusions: no  S.I.B.s/risky behavior: no    Review of Systems   PSYCHIATRIC: Pertinant items are noted in the narrative.  CONSTITUTIONAL: No weight gain or loss.  MUSCULOSKELETAL: No pain or stiffness of the joints.  NEUROLOGIC: No weakness, sensory changes, seizures, confusion, memory loss, tremor or other abnormal movements.  RESPIRATORY: No shortness of breath.  CARDIOVASCULAR: No tachycardia or chest pain.  GASTROINTESTINAL: No nausea, vomiting, pain, constipation or diarrhea.    Past Medical, Family and Social History: The patient's past medical, family and social history have been reviewed and updated as appropriate within the electronic medical record - see encounter notes.    Compliance: yes    Side effects: None    Risk Parameters:  Patient reports no suicidal ideation  Patient reports no  homicidal ideation  Patient reports no self-injurious behavior  Patient reports no violent behavior    Exam (detailed: at least 9 elements; comprehensive: all 15 elements)   Constitutional  Vitals:  Most recent vital signs, dated less than 90 days prior to this appointment, were reviewed.   There were no vitals filed for this visit.     General:  unremarkable, age appropriate     Musculoskeletal  Muscle Strength/Tone:  not examined   Gait & Station:  non-ataxic     Psychiatric  Speech:  no latency; no press   Mood & Affect:  euthymic  congruent and appropriate   Thought Process:  normal and logical   Associations:  intact   Thought Content:  no suicidality, no homicidality, delusions, or paranoia   Insight:  intact, has awareness of illness   Judgement: behavior is adequate to circumstances   Orientation:  grossly intact   Memory: intact for content of interview   Language: grossly intact   Attention Span & Concentration:  able to focus   Fund of Knowledge:  intact and appropriate to age and level of education     Assessment and Diagnosis   Status/Progress: Based on the examination today, the patient's problem(s) is/are improved.  New problems have been presented today.   Lack of compliance are not complicating management of the primary condition.  There are no active rule-out diagnoses for this patient at this time.     General Impression:    ICD-10-CM ICD-9-CM   1. ADHD (attention deficit hyperactivity disorder), combined type  F90.2 314.01   2. PMDD (premenstrual dysphoric disorder)  F32.81 625.4         Intervention/Counseling/Treatment Plan   Medication Management: The risks and benefits of medication were discussed with the patient.    Continue vyvanse 40mg daily  Continue adderall IR 5mg daily PRN  Continue prozac 10mg daily PRN for PMDD - luteal phase dosing  Continue exercise and dieting    Discussed with patient informed consent including diagnosis, risks and benefits of proposed treatment above vs.  alternative treatments vs. no treatment, as well as serious and common side effects of these treatments, and the inherent unpredictability of individual responses to these treatments. The patient expresses understanding of the above and displays the capacity to agree with this current plan. Patient also agrees that, currently, the benefits outweigh the risks and would like to pursue treatment at this time, and had no other questions.    Instructions:  Take all medications as prescribed.    Abstain from recreational drugs and alcohol.  Present to ED or call 911 for SI/HI plan or intent, psychosis, or medical emergency.    Return to Clinic:  3 months or sooner if needed    Case to be discussed with supervising staff, Dr. Angie MD.    Thomas Vogel MD  [facility] Psychiatry PGY-III

## 2024-02-26 ENCOUNTER — PATIENT MESSAGE (OUTPATIENT)
Dept: PSYCHIATRY | Facility: CLINIC | Age: 34
End: 2024-02-26
Payer: COMMERCIAL

## 2024-05-15 ENCOUNTER — OFFICE VISIT (OUTPATIENT)
Dept: PSYCHIATRY | Facility: CLINIC | Age: 34
End: 2024-05-15
Payer: COMMERCIAL

## 2024-05-15 DIAGNOSIS — F90.2 ADHD (ATTENTION DEFICIT HYPERACTIVITY DISORDER), COMBINED TYPE: Primary | ICD-10-CM

## 2024-05-15 DIAGNOSIS — F32.81 PMDD (PREMENSTRUAL DYSPHORIC DISORDER): ICD-10-CM

## 2024-05-15 PROCEDURE — 99213 OFFICE O/P EST LOW 20 MIN: CPT | Mod: 95,,, | Performed by: STUDENT IN AN ORGANIZED HEALTH CARE EDUCATION/TRAINING PROGRAM

## 2024-05-15 RX ORDER — LISDEXAMFETAMINE DIMESYLATE 40 MG/1
TABLET, CHEWABLE ORAL
Qty: 45 TABLET | Refills: 0 | Status: SHIPPED | OUTPATIENT
Start: 2024-06-12 | End: 2024-05-17

## 2024-05-15 RX ORDER — FLUOXETINE 10 MG/1
CAPSULE ORAL
Qty: 30 CAPSULE | Refills: 2 | Status: SHIPPED | OUTPATIENT
Start: 2024-05-15

## 2024-05-15 RX ORDER — LISDEXAMFETAMINE DIMESYLATE 40 MG/1
TABLET, CHEWABLE ORAL
Qty: 45 TABLET | Refills: 0 | Status: SHIPPED | OUTPATIENT
Start: 2024-07-10 | End: 2024-05-17

## 2024-05-15 RX ORDER — DEXTROAMPHETAMINE SACCHARATE, AMPHETAMINE ASPARTATE, DEXTROAMPHETAMINE SULFATE AND AMPHETAMINE SULFATE 1.25; 1.25; 1.25; 1.25 MG/1; MG/1; MG/1; MG/1
5 TABLET ORAL DAILY PRN
Qty: 30 TABLET | Refills: 0 | Status: SHIPPED | OUTPATIENT
Start: 2024-08-02

## 2024-05-15 RX ORDER — DEXTROAMPHETAMINE SACCHARATE, AMPHETAMINE ASPARTATE, DEXTROAMPHETAMINE SULFATE AND AMPHETAMINE SULFATE 1.25; 1.25; 1.25; 1.25 MG/1; MG/1; MG/1; MG/1
5 TABLET ORAL DAILY PRN
Qty: 30 TABLET | Refills: 0 | Status: SHIPPED | OUTPATIENT
Start: 2024-05-15

## 2024-05-15 RX ORDER — LISDEXAMFETAMINE DIMESYLATE 40 MG/1
TABLET, CHEWABLE ORAL
Qty: 45 TABLET | Refills: 0 | Status: SHIPPED | OUTPATIENT
Start: 2024-05-15 | End: 2024-05-17

## 2024-05-15 RX ORDER — DEXTROAMPHETAMINE SACCHARATE, AMPHETAMINE ASPARTATE, DEXTROAMPHETAMINE SULFATE AND AMPHETAMINE SULFATE 1.25; 1.25; 1.25; 1.25 MG/1; MG/1; MG/1; MG/1
5 TABLET ORAL DAILY PRN
Qty: 30 TABLET | Refills: 0 | Status: SHIPPED | OUTPATIENT
Start: 2024-07-05

## 2024-05-15 NOTE — PROGRESS NOTES
Outpatient Psychiatry Follow-Up Visit (MD/NP)    5/15/2024    Clinical Status of Patient:  Outpatient (Ambulatory)    The patient location is: at work in private area  The chief complaint leading to consultation is: attention problems    Visit type: audiovisual    Face to Face time with patient: 15 minutes  22 minutes of total time spent on the encounter, which includes face to face time and non-face to face time preparing to see the patient (eg, review of tests), Obtaining and/or reviewing separately obtained history, Documenting clinical information in the electronic or other health record, Independently interpreting results (not separately reported) and communicating results to the patient/family/caregiver, or Care coordination (not separately reported).     Each patient to whom he or she provides medical services by telemedicine is:  (1) informed of the relationship between the physician and patient and the respective role of any other health care provider with respect to management of the patient; and (2) notified that he or she may decline to receive medical services by telemedicine and may withdraw from such care at any time.    Chief Complaint:  Patsy Rosenthal is a 33 y.o. female who presents today for follow-up of attention problems. Met with patient.      Interval History and Content of Current Session:  Interim Events/Subjective Report/Content of Current Session:   Doing good today. Things have been alright, a little stressful lately, some improvement over past week. Construction going on in office. Hasn't sleep well past two days. Before that had been doing well, semester ended at beginning of this month. Felt better taking some time off during then. Feels some days where the medications don't seem as effective. Took sewing class she wanted to take, worked out earlier this week. Doing some house projects she wanted to do.  got a better paying job. Has been able to care for father-in-law  financially for his care as a result. Luteal phase dosing helpful for PMDD. Finds current regimen working well for her. Doesn't take prozac often currently.    Psychiatric Review of Systems-is patient experiencing or having changes in  sleep: no  appetite: no  weight: no  energy/anergy: no  anhedonia: no  libido: did not assess  anxiety: no  guilty/hopelessness: no  concentration: yes  Irritability: no  Paranoia/delusions: no  S.I.B.s/risky behavior: no    Review of Systems   PSYCHIATRIC: Pertinant items are noted in the narrative.  CONSTITUTIONAL: No weight gain or loss.  MUSCULOSKELETAL: No pain or stiffness of the joints.  NEUROLOGIC: No weakness, sensory changes, seizures, confusion, memory loss, tremor or other abnormal movements.  RESPIRATORY: No shortness of breath.  CARDIOVASCULAR: No tachycardia or chest pain.  GASTROINTESTINAL: No nausea, vomiting, pain, constipation or diarrhea.    Past Medical, Family and Social History: The patient's past medical, family and social history have been reviewed and updated as appropriate within the electronic medical record - see encounter notes.    Compliance: yes    Side effects: None    Risk Parameters:  Patient reports no suicidal ideation  Patient reports no homicidal ideation  Patient reports no self-injurious behavior  Patient reports no violent behavior    Exam (detailed: at least 9 elements; comprehensive: all 15 elements)   Constitutional  Vitals:  Most recent vital signs, dated less than 90 days prior to this appointment, were reviewed.   There were no vitals filed for this visit.     General:  unremarkable, age appropriate     Musculoskeletal  Muscle Strength/Tone:  not examined   Gait & Station:  non-ataxic     Psychiatric  Speech:  no latency; no press   Mood & Affect:  euthymic  congruent and appropriate   Thought Process:  normal and logical   Associations:  intact   Thought Content:  no suicidality, no homicidality, delusions, or paranoia   Insight:   intact, has awareness of illness   Judgement: behavior is adequate to circumstances   Orientation:  grossly intact   Memory: intact for content of interview   Language: grossly intact   Attention Span & Concentration:  able to focus   Fund of Knowledge:  intact and appropriate to age and level of education     Assessment and Diagnosis   Status/Progress: Based on the examination today, the patient's problem(s) is/are improved.  New problems have been presented today.   Lack of compliance are not complicating management of the primary condition.  There are no active rule-out diagnoses for this patient at this time.     General Impression:    ICD-10-CM ICD-9-CM   1. ADHD (attention deficit hyperactivity disorder), combined type  F90.2 314.01   2. PMDD (premenstrual dysphoric disorder)  F32.81 625.4           Intervention/Counseling/Treatment Plan   Medication Management: The risks and benefits of medication were discussed with the patient.    Trial vyvanse 40mg daily, can take 60mg daily for 2 weeks before luteal phase  Continue adderall IR 5mg daily PRN  Continue prozac 10mg daily PRN for PMDD - luteal phase dosing or PRN  Continue exercise and dieting  Discussed resident transition    Discussed with patient informed consent including diagnosis, risks and benefits of proposed treatment above vs. alternative treatments vs. no treatment, as well as serious and common side effects of these treatments, and the inherent unpredictability of individual responses to these treatments. The patient expresses understanding of the above and displays the capacity to agree with this current plan. Patient also agrees that, currently, the benefits outweigh the risks and would like to pursue treatment at this time, and had no other questions.    Instructions:  Take all medications as prescribed.    Abstain from recreational drugs and alcohol.  Present to ED or call 911 for SI/HI plan or intent, psychosis, or medical  emergency.    Return to Clinic:  3 months or sooner if needed    Case to be discussed with supervising staff, Dr. Angie MD.    Thomas Vogel MD  [facility] Psychiatry PGY-III

## 2024-05-16 ENCOUNTER — PATIENT MESSAGE (OUTPATIENT)
Dept: PSYCHIATRY | Facility: CLINIC | Age: 34
End: 2024-05-16
Payer: COMMERCIAL

## 2024-05-17 RX ORDER — LISDEXAMFETAMINE DIMESYLATE 40 MG/1
TABLET, CHEWABLE ORAL
Qty: 30 TABLET | Refills: 0 | Status: SHIPPED | OUTPATIENT
Start: 2024-07-12

## 2024-05-17 RX ORDER — LISDEXAMFETAMINE DIMESYLATE 40 MG/1
TABLET, CHEWABLE ORAL
Qty: 30 TABLET | Refills: 0 | Status: SHIPPED | OUTPATIENT
Start: 2024-06-14 | End: 2024-05-29 | Stop reason: SDUPTHER

## 2024-05-17 RX ORDER — LISDEXAMFETAMINE DIMESYLATE 20 MG/1
TABLET, CHEWABLE ORAL
Qty: 15 TABLET | Refills: 0 | Status: SHIPPED | OUTPATIENT
Start: 2024-07-12 | End: 2024-05-24

## 2024-05-17 RX ORDER — LISDEXAMFETAMINE DIMESYLATE 20 MG/1
TABLET, CHEWABLE ORAL
Qty: 15 TABLET | Refills: 0 | Status: SHIPPED | OUTPATIENT
Start: 2024-05-17

## 2024-05-17 RX ORDER — LISDEXAMFETAMINE DIMESYLATE 40 MG/1
TABLET, CHEWABLE ORAL
Qty: 30 TABLET | Refills: 0 | Status: SHIPPED | OUTPATIENT
Start: 2024-05-17

## 2024-05-17 RX ORDER — LISDEXAMFETAMINE DIMESYLATE 20 MG/1
TABLET, CHEWABLE ORAL
Qty: 15 TABLET | Refills: 0 | Status: SHIPPED | OUTPATIENT
Start: 2024-06-14 | End: 2024-05-24

## 2024-05-24 RX ORDER — LISDEXAMFETAMINE DIMESYLATE 20 MG/1
TABLET, CHEWABLE ORAL
Qty: 15 TABLET | Refills: 0 | Status: SHIPPED | OUTPATIENT
Start: 2024-06-14

## 2024-05-24 RX ORDER — LISDEXAMFETAMINE DIMESYLATE 20 MG/1
TABLET, CHEWABLE ORAL
Qty: 15 TABLET | Refills: 0 | Status: SHIPPED | OUTPATIENT
Start: 2024-08-09

## 2024-05-24 RX ORDER — LISDEXAMFETAMINE DIMESYLATE 20 MG/1
TABLET, CHEWABLE ORAL
Qty: 15 TABLET | Refills: 0 | Status: SHIPPED | OUTPATIENT
Start: 2024-07-12

## 2024-05-28 ENCOUNTER — PATIENT MESSAGE (OUTPATIENT)
Dept: PSYCHIATRY | Facility: CLINIC | Age: 34
End: 2024-05-28
Payer: COMMERCIAL

## 2024-05-29 RX ORDER — LISDEXAMFETAMINE DIMESYLATE 40 MG/1
TABLET, CHEWABLE ORAL
Qty: 30 TABLET | Refills: 0 | Status: SHIPPED | OUTPATIENT
Start: 2024-06-14

## 2024-06-03 RX ORDER — LISDEXAMFETAMINE DIMESYLATE 40 MG/1
40 TABLET, CHEWABLE ORAL DAILY
Qty: 30 TABLET | Refills: 0 | Status: SHIPPED | OUTPATIENT
Start: 2024-06-03

## 2024-07-30 ENCOUNTER — OFFICE VISIT (OUTPATIENT)
Dept: PSYCHIATRY | Facility: CLINIC | Age: 34
End: 2024-07-30
Payer: COMMERCIAL

## 2024-07-30 VITALS
SYSTOLIC BLOOD PRESSURE: 114 MMHG | WEIGHT: 194.13 LBS | HEART RATE: 83 BPM | DIASTOLIC BLOOD PRESSURE: 63 MMHG | BODY MASS INDEX: 31.33 KG/M2

## 2024-07-30 DIAGNOSIS — F32.81 PMDD (PREMENSTRUAL DYSPHORIC DISORDER): Primary | ICD-10-CM

## 2024-07-30 DIAGNOSIS — F90.2 ADHD (ATTENTION DEFICIT HYPERACTIVITY DISORDER), COMBINED TYPE: ICD-10-CM

## 2024-07-30 PROCEDURE — 3008F BODY MASS INDEX DOCD: CPT | Mod: CPTII,S$GLB,, | Performed by: NURSE PRACTITIONER

## 2024-07-30 PROCEDURE — 3074F SYST BP LT 130 MM HG: CPT | Mod: CPTII,S$GLB,, | Performed by: NURSE PRACTITIONER

## 2024-07-30 PROCEDURE — 99214 OFFICE O/P EST MOD 30 MIN: CPT | Mod: S$GLB,,, | Performed by: NURSE PRACTITIONER

## 2024-07-30 PROCEDURE — 3078F DIAST BP <80 MM HG: CPT | Mod: CPTII,S$GLB,, | Performed by: NURSE PRACTITIONER

## 2024-07-30 PROCEDURE — 99999 PR PBB SHADOW E&M-EST. PATIENT-LVL III: CPT | Mod: PBBFAC,,, | Performed by: NURSE PRACTITIONER

## 2024-07-30 RX ORDER — DEXTROAMPHETAMINE SACCHARATE, AMPHETAMINE ASPARTATE, DEXTROAMPHETAMINE SULFATE AND AMPHETAMINE SULFATE 5; 5; 5; 5 MG/1; MG/1; MG/1; MG/1
1 TABLET ORAL 3 TIMES DAILY
Qty: 90 TABLET | Refills: 0 | Status: SHIPPED | OUTPATIENT
Start: 2024-07-30

## 2024-07-30 RX ORDER — FLUOXETINE 10 MG/1
CAPSULE ORAL
Qty: 30 CAPSULE | Refills: 2 | Status: SHIPPED | OUTPATIENT
Start: 2024-07-30

## 2024-07-30 RX ORDER — DEXTROAMPHETAMINE SACCHARATE, AMPHETAMINE ASPARTATE, DEXTROAMPHETAMINE SULFATE AND AMPHETAMINE SULFATE 5; 5; 5; 5 MG/1; MG/1; MG/1; MG/1
1 TABLET ORAL 3 TIMES DAILY
Qty: 90 TABLET | Refills: 0 | Status: SHIPPED | OUTPATIENT
Start: 2024-08-28

## 2024-07-30 RX ORDER — DEXTROAMPHETAMINE SACCHARATE, AMPHETAMINE ASPARTATE, DEXTROAMPHETAMINE SULFATE AND AMPHETAMINE SULFATE 5; 5; 5; 5 MG/1; MG/1; MG/1; MG/1
1 TABLET ORAL 3 TIMES DAILY
Qty: 90 TABLET | Refills: 0 | Status: SHIPPED | OUTPATIENT
Start: 2024-09-26

## 2024-07-30 NOTE — PROGRESS NOTES
Outpatient Psychiatry Follow-Up Visit (MD/NP)    7/30/2024    Clinical Status of Patient:  Outpatient (Ambulatory)    Chief Complaint:  Ptasy Rosenthal is a 33 y.o. female who presents today for follow-up of attention problems.  Met with patient.  Pt new to me.    Last visit was: with resident. Chart and  reviewed.    Interval History and Content of Current Session:  Current Psychiatric Medications/changes  Trial vyvanse 40mg daily, can take 60mg daily for 2 weeks before luteal phase  Continue adderall IR 5mg daily PRN  Continue prozac 10mg daily PRN for PMDD - luteal phase dosing or PRN    Established therapeutic rapport and transition of care from previous provider. Wishes to return to [facility] due to cost and availability of Vyvanse. Affect appears bright with euthymic mood. Thought processes are linear, clear, and organized. Denies SI/HI/AVH.     Psychotherapy  Target symptoms: distractability, lack of focus  Why chosen therapy is appropriate versus another modality: relevant to diagnosis  Outcome monitoring methods: self-report  Therapeutic intervention type: insight oriented psychotherapy  Topics discussed/themes: building skills sets for symptom management, symptom recognition  The patient's response to the intervention is accepting. The patient's progress toward treatment goals is good.   Duration of intervention: 15 minutes.    Psychiatric Review of Systems-is patient experiencing or having changes in  sleep: no  appetite: no  weight: no  energy/anergy: no  anhedonia: no  libido: did not assess  anxiety: no  guilty/hopelessness: no  concentration: yes  Irritability: no  Paranoia/delusions: no  S.I.B.s/risky behavior: no     Review of Systems   PSYCHIATRIC: Pertinant items are noted in the narrative.  CONSTITUTIONAL: No weight gain or loss.  MUSCULOSKELETAL: No pain or stiffness of the joints.  NEUROLOGIC: No weakness, sensory changes, seizures, confusion, memory loss, tremor or other abnormal  "movements.  RESPIRATORY: No shortness of breath.  CARDIOVASCULAR: No tachycardia or chest pain.  GASTROINTESTINAL: No nausea, vomiting, pain, constipation or diarrhea.     Past Medical, Family and Social History: The patient's past medical, family and social history have been reviewed and updated as appropriate within the electronic medical record - see encounter notes.     Compliance: yes     Side effects: None     Risk Parameters:  Patient reports no suicidal ideation  Patient reports no homicidal ideation  Patient reports no self-injurious behavior  Patient reports no violent behavior     Exam (detailed: at least 9 elements; comprehensive: all 15 elements)   Constitutional  Vitals:  Most recent vital signs, dated less than 90 days prior to this appointment, were reviewed.      Ht: 5' 6" (1.676 m)>365 days  Last Wt: 92.3 kg>30 days  BP: 111/57>1 day  Pulse: 62>1 day   General:  unremarkable, age appropriate      Musculoskeletal  Muscle Strength/Tone:  not examined   Gait & Station:  non-ataxic      Psychiatric  Speech:  no latency; no press   Mood & Affect:  euthymic  congruent and appropriate   Thought Process:  normal and logical   Associations:  intact   Thought Content:  no suicidality, no homicidality, delusions, or paranoia   Insight:  intact, has awareness of illness   Judgement: behavior is adequate to circumstances   Orientation:  grossly intact   Memory: intact for content of interview   Language: grossly intact   Attention Span & Concentration:  able to focus   Fund of Knowledge:  intact and appropriate to age and level of education         Assessment and Diagnosis   Status/Progress: Based on the examination today, the patient's problem(s) is/are well controlled.  New problems have not been presented today.   Co-morbidities and Lack of compliance are not complicating management of the primary condition.  There are no active rule-out diagnoses for this patient at this time.     General Impression:       " ICD-10-CM ICD-9-CM   1. ADHD (attention deficit hyperactivity disorder), combined type  F90.2 314.01   2. PMDD (premenstrual dysphoric disorder)  F32.81 625.4       Intervention/Counseling/Treatment Plan   Medication Management: Continue current medications. The risks and benefits of medication were discussed with the patient.  Adderall 20 mg 2-3 x daily  Continue prozac 10mg daily PRN for PMDD - luteal phase dosing or PRN    Return to Clinic: 3 months    Risks, benefits, side effects and alternative treatments discussed with patient. Patient agrees with the current plan as documented.  Encouraged Patient to keep future appointments.  Take medications as prescribed and abstain from substance abuse.  Pt to present to ED for thoughts to harm herself or others

## 2024-08-10 PROBLEM — F32.81 PMDD (PREMENSTRUAL DYSPHORIC DISORDER): Status: ACTIVE | Noted: 2024-08-10

## 2024-08-26 DIAGNOSIS — F90.2 ADHD (ATTENTION DEFICIT HYPERACTIVITY DISORDER), COMBINED TYPE: ICD-10-CM

## 2024-08-26 RX ORDER — DEXTROAMPHETAMINE SACCHARATE, AMPHETAMINE ASPARTATE, DEXTROAMPHETAMINE SULFATE AND AMPHETAMINE SULFATE 5; 5; 5; 5 MG/1; MG/1; MG/1; MG/1
1 TABLET ORAL 3 TIMES DAILY
Qty: 90 TABLET | Refills: 0 | OUTPATIENT
Start: 2024-08-26

## 2024-09-18 ENCOUNTER — PATIENT MESSAGE (OUTPATIENT)
Dept: PSYCHIATRY | Facility: CLINIC | Age: 34
End: 2024-09-18
Payer: COMMERCIAL

## 2024-10-11 ENCOUNTER — OFFICE VISIT (OUTPATIENT)
Dept: PSYCHIATRY | Facility: CLINIC | Age: 34
End: 2024-10-11
Payer: COMMERCIAL

## 2024-10-11 DIAGNOSIS — F90.2 ADHD (ATTENTION DEFICIT HYPERACTIVITY DISORDER), COMBINED TYPE: ICD-10-CM

## 2024-10-11 DIAGNOSIS — F32.81 PMDD (PREMENSTRUAL DYSPHORIC DISORDER): ICD-10-CM

## 2024-10-11 RX ORDER — FLUOXETINE 10 MG/1
CAPSULE ORAL
Qty: 90 CAPSULE | Refills: 2 | Status: SHIPPED | OUTPATIENT
Start: 2024-10-11

## 2024-10-11 RX ORDER — DEXTROAMPHETAMINE SACCHARATE, AMPHETAMINE ASPARTATE, DEXTROAMPHETAMINE SULFATE AND AMPHETAMINE SULFATE 5; 5; 5; 5 MG/1; MG/1; MG/1; MG/1
1 TABLET ORAL 3 TIMES DAILY
Qty: 90 TABLET | Refills: 0 | Status: SHIPPED | OUTPATIENT
Start: 2024-12-31

## 2024-10-11 RX ORDER — DEXTROAMPHETAMINE SACCHARATE, AMPHETAMINE ASPARTATE, DEXTROAMPHETAMINE SULFATE AND AMPHETAMINE SULFATE 5; 5; 5; 5 MG/1; MG/1; MG/1; MG/1
1 TABLET ORAL 3 TIMES DAILY
Qty: 90 TABLET | Refills: 0 | Status: SHIPPED | OUTPATIENT
Start: 2024-11-02

## 2024-10-11 RX ORDER — DEXTROAMPHETAMINE SACCHARATE, AMPHETAMINE ASPARTATE, DEXTROAMPHETAMINE SULFATE AND AMPHETAMINE SULFATE 5; 5; 5; 5 MG/1; MG/1; MG/1; MG/1
1 TABLET ORAL 3 TIMES DAILY
Qty: 90 TABLET | Refills: 0 | Status: SHIPPED | OUTPATIENT
Start: 2024-12-01

## 2024-10-11 NOTE — PROGRESS NOTES
Outpatient Psychiatry Follow-Up Visit (MD/NP)    10/11/2024    Clinical Status of Patient:  Outpatient (Ambulatory)    Chief Complaint:  Patsy Rosenthal is a 33 y.o. female who presents today for follow-up of attention problems.  Met with patient.     Last visit was: with resident. Chart and  reviewed.  The patient location is: home  The chief complaint leading to consultation is: ADHD    Visit type: audiovisual    Face to Face time with patient: 30 minutes  35 minutes of total time spent on the encounter, which includes face to face time and non-face to face time preparing to see the patient (eg, review of tests), Obtaining and/or reviewing separately obtained history, Documenting clinical information in the electronic or other health record, Independently interpreting results (not separately reported) and communicating results to the patient/family/caregiver, or Care coordination (not separately reported).     Each patient to whom he or she provides medical services by telemedicine is:  (1) informed of the relationship between the physician and patient and the respective role of any other health care provider with respect to management of the patient; and (2) notified that he or she may decline to receive medical services by telemedicine and may withdraw from such care at any time.    Interval History and Content of Current Session:  Current Psychiatric Medications/changes  Trial vyvanse 40mg daily, can take 60mg daily for 2 weeks before luteal phase  Continue adderall IR 5mg daily PRN  Continue prozac 10mg daily PRN for PMDD - luteal phase dosing or PRN    Virtual Visit: Established therapeutic rapport and transition of care from previous provider. Wishes to return to [facility] due to cost and availability of Vyvanse. Affect appears bright with euthymic mood. Thought processes are linear, clear, and organized. Denies SI/HI/AVH.     Psychotherapy  Target symptoms: distractability, lack of focus  Why chosen  "therapy is appropriate versus another modality: relevant to diagnosis  Outcome monitoring methods: self-report  Therapeutic intervention type: insight oriented psychotherapy  Topics discussed/themes: building skills sets for symptom management, symptom recognition  The patient's response to the intervention is accepting. The patient's progress toward treatment goals is good.   Duration of intervention: 15 minutes.    Psychiatric Review of Systems-is patient experiencing or having changes in  sleep: no  appetite: no  weight: no  energy/anergy: no  anhedonia: no  libido: did not assess  anxiety: no  guilty/hopelessness: no  concentration: yes  Irritability: no  Paranoia/delusions: no  S.I.B.s/risky behavior: no     Review of Systems   PSYCHIATRIC: Pertinant items are noted in the narrative.  CONSTITUTIONAL: No weight gain or loss.  MUSCULOSKELETAL: No pain or stiffness of the joints.  NEUROLOGIC: No weakness, sensory changes, seizures, confusion, memory loss, tremor or other abnormal movements.  RESPIRATORY: No shortness of breath.  CARDIOVASCULAR: No tachycardia or chest pain.  GASTROINTESTINAL: No nausea, vomiting, pain, constipation or diarrhea.     Past Medical, Family and Social History: The patient's past medical, family and social history have been reviewed and updated as appropriate within the electronic medical record - see encounter notes.     Compliance: yes     Side effects: None     Risk Parameters:  Patient reports no suicidal ideation  Patient reports no homicidal ideation  Patient reports no self-injurious behavior  Patient reports no violent behavior     Exam (detailed: at least 9 elements; comprehensive: all 15 elements)   Constitutional  Vitals:  Most recent vital signs, dated less than 90 days prior to this appointment, were reviewed.      Ht: 5' 6" (1.676 m)>365 days  Last Wt: 92.3 kg>30 days  BP: 111/57>1 day  Pulse: 62>1 day   General:  unremarkable, age appropriate      Musculoskeletal  Muscle " Strength/Tone:  not examined   Gait & Station:  non-ataxic      Psychiatric  Speech:  no latency; no press   Mood & Affect:  euthymic  congruent and appropriate   Thought Process:  normal and logical   Associations:  intact   Thought Content:  no suicidality, no homicidality, delusions, or paranoia   Insight:  intact, has awareness of illness   Judgement: behavior is adequate to circumstances   Orientation:  grossly intact   Memory: intact for content of interview   Language: grossly intact   Attention Span & Concentration:  able to focus   Fund of Knowledge:  intact and appropriate to age and level of education         Assessment and Diagnosis   Status/Progress: Based on the examination today, the patient's problem(s) is/are well controlled.  New problems have not been presented today.   Co-morbidities and Lack of compliance are not complicating management of the primary condition.  There are no active rule-out diagnoses for this patient at this time.     General Impression:       ICD-10-CM ICD-9-CM   1. ADHD (attention deficit hyperactivity disorder), combined type  F90.2 314.01   2. PMDD (premenstrual dysphoric disorder)  F32.81 625.4       Intervention/Counseling/Treatment Plan   Medication Management: Continue current medications. The risks and benefits of medication were discussed with the patient.  Adderall 20 mg 2-3 x daily  Continue prozac 10mg daily PRN for PMDD - luteal phase dosing or PRN    Return to Clinic: 3 months    Risks, benefits, side effects and alternative treatments discussed with patient. Patient agrees with the current plan as documented.  Encouraged Patient to keep future appointments.  Take medications as prescribed and abstain from substance abuse.  Pt to present to ED for thoughts to harm herself or others

## 2024-12-09 ENCOUNTER — OFFICE VISIT (OUTPATIENT)
Dept: INTERNAL MEDICINE | Facility: CLINIC | Age: 34
End: 2024-12-09
Payer: COMMERCIAL

## 2024-12-09 VITALS
HEIGHT: 66 IN | DIASTOLIC BLOOD PRESSURE: 70 MMHG | WEIGHT: 194.88 LBS | SYSTOLIC BLOOD PRESSURE: 120 MMHG | HEART RATE: 79 BPM | OXYGEN SATURATION: 99 % | BODY MASS INDEX: 31.32 KG/M2

## 2024-12-09 DIAGNOSIS — Z71.85 IMMUNIZATION COUNSELING: ICD-10-CM

## 2024-12-09 DIAGNOSIS — Z12.4 CERVICAL CANCER SCREENING: ICD-10-CM

## 2024-12-09 DIAGNOSIS — M54.50 ACUTE BILATERAL LOW BACK PAIN WITHOUT SCIATICA: Primary | ICD-10-CM

## 2024-12-09 PROCEDURE — 99213 OFFICE O/P EST LOW 20 MIN: CPT | Mod: S$GLB,,, | Performed by: FAMILY MEDICINE

## 2024-12-09 PROCEDURE — 90656 IIV3 VACC NO PRSV 0.5 ML IM: CPT | Mod: S$GLB,,, | Performed by: FAMILY MEDICINE

## 2024-12-09 PROCEDURE — G0008 ADMIN INFLUENZA VIRUS VAC: HCPCS | Mod: S$GLB,,, | Performed by: FAMILY MEDICINE

## 2024-12-09 PROCEDURE — 1160F RVW MEDS BY RX/DR IN RCRD: CPT | Mod: CPTII,S$GLB,, | Performed by: FAMILY MEDICINE

## 2024-12-09 PROCEDURE — 3008F BODY MASS INDEX DOCD: CPT | Mod: CPTII,S$GLB,, | Performed by: FAMILY MEDICINE

## 2024-12-09 PROCEDURE — 99999 PR PBB SHADOW E&M-EST. PATIENT-LVL V: CPT | Mod: PBBFAC,,, | Performed by: FAMILY MEDICINE

## 2024-12-09 PROCEDURE — 3078F DIAST BP <80 MM HG: CPT | Mod: CPTII,S$GLB,, | Performed by: FAMILY MEDICINE

## 2024-12-09 PROCEDURE — 3074F SYST BP LT 130 MM HG: CPT | Mod: CPTII,S$GLB,, | Performed by: FAMILY MEDICINE

## 2024-12-09 PROCEDURE — 1159F MED LIST DOCD IN RCRD: CPT | Mod: CPTII,S$GLB,, | Performed by: FAMILY MEDICINE

## 2024-12-09 RX ORDER — CYCLOBENZAPRINE HCL 5 MG
5 TABLET ORAL 3 TIMES DAILY PRN
Qty: 30 TABLET | Refills: 0 | Status: SHIPPED | OUTPATIENT
Start: 2024-12-09 | End: 2024-12-19

## 2024-12-09 NOTE — PROGRESS NOTES
After obtaining verbal consent from the patient, and per orders of Dr. Gloria, injection of fluarix Lot [id number] 6/30/25 given in the LD by CASE MILLER. Patient tolerated well and band aid applied. Patient instructed to remain in clinic for 15 minutes afterwards, and to report any adverse reaction to me immediately.

## 2024-12-09 NOTE — PROGRESS NOTES
"Subjective:      Patient ID: Patsy Rosenthal is a 34 y.o. female.    Chief Complaint: Referral for PT  (Back and Spine) and Back Pain    History of Present Illness    CHIEF COMPLAINT:  - Ms. Rosenthal presents with lower back pain that started within the past two weeks ago, worsening with certain movements and positions.    HPI:  Ms. Rosenthal, a  and , reports lower back pain that began about 2 weeks ago, on the Saturday after Thanksgiving. Initially, she felt a strain in the muscle right under the pelvis, above the hip, predominantly on the left side. The pain was severe enough that even light touch was painful.    On the following Monday, while sumo squatting to  a 5-pound crate, the patient had a sudden, sharp pain near her spine. This episode involved "nerve pain," forcing her to stand still for several minutes. The tenderness remained in the same muscle area, and she had difficulty putting pressure through her feet or lifting her leg while in a bridge position without pain close to her glute.    The pain has been constant since onset, with varying intensity. She describes it as a dull ache that is present but not unbearable when standing. Prolonged sitting, particularly in certain positions, exacerbates the pain. For instance, while working on a gingerbread house with her son at a dining room bench, the pain was significantly aggravated. She also notes discomfort when getting on and off the toilet.    Ms. Rosenthal saw a physical therapist unofficially the Thursday following the onset of symptoms. The therapist suspected a minor herniated disc based on the limited exam. Her symptoms improve with movement, and she has not needed pain medication in recent days, except for the 2 most severe days.    Sleep is not significantly affected, though she does need to readjust her position throughout the night. Ms. Rosenthal also mentions feeling the pain more in her abs at times and notes " that it seems to migrate. She expresses concern about feeling pelvic instability when walking, though this sensation improves with exercises provided by the physical therapist.    Prior to this incident, the patient was mostly sedentary due to her work and studies, which she believes contributed to her current condition. She has a history of competitive powerlifting before her pregnancy 2 years ago but has not returned to intense exercise since then.    Ms. Rosenthal denies numbness, tingling, or pain radiating down her leg, fever, constipation, and numbness around the genital area. Ms. Rosenthal denies having high blood pressure, diabetes, or heart issues.    Answers submitted by the patient for this visit:  Back Pain Questionnaire (Submitted on 12/9/2024)  Chief Complaint: Back pain  Chronicity: new  Onset: in the past 7 days  Frequency: constantly  Progression since onset: waxing and waning  Pain location: gluteal, sacro-iliac  Pain quality: aching  Radiates to: does not radiate  Pain - numeric: 6/10  Pain is: worse during the day  Aggravated by: bending, sitting  Stiffness is present: in the morning, at night  abdominal pain: No  bladder incontinence: No  bowel incontinence: No  chest pain: No  dysuria: No  fever: No  headaches: No  leg pain: No  numbness: No  paresis: No  paresthesias: No  pelvic pain: Yes  perianal numbness: No  tingling: No  weakness: Yes  weight loss: No  genital pain: No  hematuria: No  Risk factors: lack of exercise, poor posture, sedentary lifestyle  Pain severity: moderate  Improvement on treatment: mild    MEDICATIONS:  - Adderall 20 mg, up to 3 times daily (typically twice daily), for ADHD  - Fluoxetine, taken during the third week of menstrual cycle, for PMDD    MEDICAL HISTORY:  - ADHD  - PMDD (Premenstrual Dysphoric Disorder)  - Last Pap: Ms. 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    Patient Active Problem List   Diagnosis    Suspected fetal anomaly not found    ADHD (attention deficit hyperactivity disorder), combined type    PMDD (premenstrual dysphoric disorder)          Current Outpatient Medications:     [START ON 12/31/2024] dextroamphetamine-amphetamine (ADDERALL) 20 mg tablet, Take 1 tablet by mouth 3 (three) times daily., Disp: 90 tablet, Rfl: 0    dextroamphetamine-amphetamine (ADDERALL) 20 mg tablet, Take 1 tablet by mouth 3 (three) times daily., Disp: 90 tablet, Rfl: 0    dextroamphetamine-amphetamine (ADDERALL) 20 mg tablet, Take 1 tablet by mouth 3 (three) times daily., Disp: 90 tablet, Rfl: 0    FLUoxetine 10 MG capsule, Start taking one capsule daily starting on day 14 of cycle until menses., Disp: 90 capsule, Rfl: 2    prenatal 25/iron fum/folic/dha (PRENATAL-1 ORAL), Take by mouth., Disp: , Rfl:     cyclobenzaprine (FLEXERIL) 5 MG tablet, Take 1 tablet (5 mg total) by mouth 3 (three) times daily as needed for Muscle spasms., Disp: 30 tablet, Rfl: 0  No current facility-administered medications for this visit.    Lab Results   Component Value Date     (L) 06/29/2022    K 3.9 06/29/2022     06/29/2022    CO2 20 (L) 06/29/2022     (H) 06/29/2022    BUN 9 06/29/2022    CREATININE 0.8 06/29/2022    CALCIUM 8.9 06/29/2022    PROT 6.0 06/29/2022    ALBUMIN 2.6 (L) 06/29/2022    BILITOT 0.5 06/29/2022    ALKPHOS 100 06/29/2022    AST 32 06/29/2022    ALT 26 06/29/2022    ANIONGAP 10 06/29/2022    ESTGFRAFRICA >60 06/29/2022    EGFRNONAA >60 06/29/2022    WBC 14.11 (H) 06/30/2022    HGB 12.3 06/30/2022    HGB  "13.6 06/28/2022    HCT 36.6 (L) 06/30/2022    MCV 94 06/30/2022     06/30/2022    TSH 2.030 03/22/2018       No results found for: "HGBA1C"  No results found for: "MICALBCREAT"  No results found for: "LDLCALC", "CHOL", "HDL", "TRIG"    Reviewed PMH, PSH, SH, FH and any changes have been updated.    Vitals:    12/09/24 1136   BP: 120/70   Pulse: 79   SpO2: 99%   Weight: 88.4 kg (194 lb 14.2 oz)   Height: 5' 6" (1.676 m)   PainSc:   4   PainLoc: Back     Objective:   Physical Exam    General: Pleasant. No acute distress. Well-developed. Well-nourished.  Eyes: EOMBI. Sclerae anicteric.  HENT: Normocephalic. Atraumatic. Nares patent. Moist oral mucosa.  Cardiovascular: Regular rate and rhythm. No murmurs. No gallops. No rubs. Normal S1 and S2.  Respiratory: Normal respiratory effort. Clear to auscultation bilaterally. No rales. No rhonchi. No wheezing.  Abdomen: Soft. Nontender. Nondistended. Normoactive bowel sounds.  Musculoskeletal: No obvious deformity. Normal range of motion.  Extremities: No lower extremity edema.  Neurological: Alert and lucid. Normal gait. No gross deficits.  Psychiatric: Normal mood. Normal affect. Good insight. Good judgment.  Skin: Warm. Intact.  MSK: Spine - Lumbar: No midline spinal tenderness. Negative straight leg raise.          Assessment:         ICD-10-CM ICD-9-CM   1. Acute bilateral low back pain without sciatica  M54.50 724.2     338.19   2. Cervical cancer screening  Z12.4 V76.2   3. Immunization counseling  Z71.85 V65.49        Plan:   Assessment & Plan     Assessed lower back pain, suspect muscular strain    Recommend conservative management with physical therapy as initial approach   Considered muscle relaxants for severe pain episodes, balancing potential benefits with side effects   Deferred imaging studies (x-ray, MRI) unless symptoms persist after physical therapy trial    LOW BACK PAIN:  - Explained rationale for physical therapy approach and gradual return to " activities.  - Discussed importance of proper posture and ergonomics, especially with prolonged sitting.  - Ms. Rosenthal to utilize standing desk option when working to reduce prolonged sitting.  - Ms. Rosenthal to limit picking up 2.5-year-old child as tolerated during recovery period.  - Started muscle relaxer PRN for severe pain episodes, particularly before bed if nighttime pain occurs. Potential adverse s/e discussed.  - Recommend ibuprofen before or after physical therapy sessions as needed for pain management.  - Referred to [facility] for physical therapy evaluation and treatment of lower back pain.    ROUTINE GYNECOLOGICAL CARE:  - Referred to Dr. Duggan or general OB[facility]GYN at [facility] for routine GYN care.    PREVENTIVE CARE:  - Ms. Rosenthal to consider COVID-19 vaccination after a few weeks, either at follow-up visit or at pharmacy.  - Administered influenza vaccine in office.    FOLLOW-UP:  - Follow up in 4-6 weeks to reassess back pain and progress with physical therapy.  - Contact the office if experiencing severe pain or new symptoms before scheduled follow-up.         Acute bilateral low back pain without sciatica  -     Cancel: Ambulatory referral/consult to Physical/Occupational Therapy; Future; Expected date: 12/16/2024  -     Ambulatory referral/consult to Physical/Occupational Therapy; Future; Expected date: 12/16/2024    Cervical cancer screening  -     Ambulatory referral/consult to Gynecology; Future; Expected date: 12/16/2024    Immunization counseling  -     influenza (Flulaval, Fluzone, Fluarix) 45 mcg/0.5 mL IM vaccine (> or = 6 mo) 0.5 mL    Other orders  -     cyclobenzaprine (FLEXERIL) 5 MG tablet; Take 1 tablet (5 mg total) by mouth 3 (three) times daily as needed for Muscle spasms.  Dispense: 30 tablet; Refill: 0        Chronic conditions status updated as per HPI.  Other than changes above, cont current medications and maintain follow up with specialists.      Follow up in about 6 weeks  (around 1/20/2025) for f/u back pain.      Aylin Gloria MD  [facility]    This note was generated with the assistance of ambient listening technology. Verbal consent was obtained by the patient and accompanying visitor(s) for the recording of patient appointment to facilitate this note. I attest to having reviewed and edited the generated note for accuracy, though some syntax or spelling errors may persist. Please contact the author of this note for any clarification.       There are no Patient Instructions on file for this visit.  All of your core healthy metrics are met.

## 2024-12-31 ENCOUNTER — OFFICE VISIT (OUTPATIENT)
Dept: OBSTETRICS AND GYNECOLOGY | Facility: CLINIC | Age: 34
End: 2024-12-31
Attending: OBSTETRICS & GYNECOLOGY
Payer: COMMERCIAL

## 2024-12-31 VITALS
HEIGHT: 67 IN | SYSTOLIC BLOOD PRESSURE: 108 MMHG | BODY MASS INDEX: 29.93 KG/M2 | DIASTOLIC BLOOD PRESSURE: 60 MMHG | WEIGHT: 190.69 LBS

## 2024-12-31 DIAGNOSIS — Z01.419 WOMEN'S ANNUAL ROUTINE GYNECOLOGICAL EXAMINATION: Primary | ICD-10-CM

## 2024-12-31 DIAGNOSIS — Z30.431 ENCOUNTER FOR ROUTINE CHECKING OF INTRAUTERINE CONTRACEPTIVE DEVICE (IUD): ICD-10-CM

## 2024-12-31 DIAGNOSIS — Z12.4 CERVICAL CANCER SCREENING: ICD-10-CM

## 2024-12-31 PROCEDURE — 99999 PR PBB SHADOW E&M-EST. PATIENT-LVL III: CPT | Mod: PBBFAC,,, | Performed by: OBSTETRICS & GYNECOLOGY

## 2024-12-31 RX ORDER — DOXYCYCLINE 100 MG/1
100 CAPSULE ORAL 2 TIMES DAILY
COMMUNITY
Start: 2024-12-29

## 2024-12-31 RX ORDER — NYSTATIN AND TRIAMCINOLONE ACETONIDE 100000; 1 [USP'U]/G; MG/G
CREAM TOPICAL
Qty: 30 G | Refills: 1 | Status: SHIPPED | OUTPATIENT
Start: 2024-12-31 | End: 2025-12-31

## 2024-12-31 NOTE — PROGRESS NOTES
"Patsy Rosenthal is a 34 y.o. female  who presents for annual exam.  She has had the Mirena IUD in place since 2023 and continues to do well with only occasional episodes of light bleeding.  Denies pelvic pain.  She describes noticing vulvar itching prior to her menses.  Currently, she is asymptomatic.  Otherwise, no gyn complaints.  Denies recent changes medical history.  Her child is now 2.5 years old.  Patient's last menstrual period was 10/20/2024 (approximate).    Blood pressure 108/60, height 5' 7" (1.702 m), weight 86.5 kg (190 lb 11.2 oz), last menstrual period 10/20/2024, not currently breastfeeding.    10/26/21 Pap: Negative    Past Medical History:   Diagnosis Date    ADHD (attention deficit hyperactivity disorder)     Depression        Past Surgical History:   Procedure Laterality Date    WISDOM TOOTH EXTRACTION      wisdom tooth removal         OB History          1    Para   1    Term   1       0    AB   0    Living   1         SAB   0    IAB   0    Ectopic   0    Multiple   0    Live Births   1                 ROS:  GENERAL: Feeling well overall.   SKIN: Denies rash or lesions.   HEAD: Reports recent URI - feels better now.  NODES: Denies enlarged lymph nodes.   CHEST: Denies chest pain or shortness of breath.   CARDIOVASCULAR: Denies palpitations or left sided chest pain.   ABDOMEN: No abdominal pain, nausea, vomiting or rectal bleeding.   URINARY: No dysuria or hematuria.  REPRODUCTIVE: See HPI.   BREASTS: Denies pain, lumps, or nipple discharge.   HEMATOLOGIC: No easy bruisability or excessive bleeding.   MUSCULOSKELETAL: Denies joint pain or swelling.   NEUROLOGIC: Denies syncope or weakness.   PSYCHIATRIC: Denies depression.    PE:   (chaperone present during entire exam)  APPEARANCE: Well nourished, well developed, in no acute distress.  BREASTS: Symmetrical, no skin changes or visible lesions. No palpable masses, nipple discharge or adenopathy bilaterally.  ABDOMEN: " Soft. No tenderness or masses.  VULVA: No lesions. Normal female genitalia.  URETHRAL MEATUS: Normal size and location, no lesions, no prolapse.  URETHRA: No masses, tenderness, prolapse or scarring.  VAGINA: Moist and well rugated, no abnormal discharge, no significant cystocele or rectocele.  CERVIX: No lesions and discharge. IUD strings at os.  PAP done.  UTERUS: Normal size, regular shape, mobile, non-tender, bladder base nontender.  ADNEXA: No masses, tenderness or CDS nodularity.  ANUS PERINEUM: Normal.      Diagnosis:  1. Women's annual routine gynecological examination    2. Cervical cancer screening    3. Encounter for routine checking of intrauterine contraceptive device (IUD)          PLAN:    Orders Placed This Encounter    HPV High Risk Genotypes, PCR    Liquid-Based Pap Smear, Screening       Patient was counseled today on the need for annual gyn exams.  We reviewed her usage of the Mirena IUD with which she is doing well and desires to continue.  She will use Mycolog cream for any upcoming vulvar itching.    Follow-up in 1 year.    This note was created with voice recognition software.  Grammatical, syntax and spelling errors may be inevitable.

## 2025-01-07 ENCOUNTER — PATIENT MESSAGE (OUTPATIENT)
Dept: OBSTETRICS AND GYNECOLOGY | Facility: CLINIC | Age: 35
End: 2025-01-07
Payer: COMMERCIAL

## 2025-01-21 ENCOUNTER — TELEPHONE (OUTPATIENT)
Dept: INTERNAL MEDICINE | Facility: CLINIC | Age: 35
End: 2025-01-21
Payer: COMMERCIAL

## 2025-01-21 ENCOUNTER — OFFICE VISIT (OUTPATIENT)
Dept: INTERNAL MEDICINE | Facility: CLINIC | Age: 35
End: 2025-01-21
Payer: COMMERCIAL

## 2025-01-21 DIAGNOSIS — F41.9 ANXIETY: ICD-10-CM

## 2025-01-21 DIAGNOSIS — M54.50 ACUTE BILATERAL LOW BACK PAIN WITHOUT SCIATICA: Primary | ICD-10-CM

## 2025-01-21 DIAGNOSIS — G47.09 OTHER INSOMNIA: ICD-10-CM

## 2025-01-21 NOTE — PROGRESS NOTES
Subjective:      Patient ID: Patsy Rosenthal is a 34 y.o. female.    The patient location is: [place]  Chief Complaint: Back Pain and Insomnia      Visit type: audiovisual    Face to Face time with patient: 15  18 minutes of total time spent on the encounter, which includes face to face time and non-face to face time preparing to see the patient (eg, review of tests), Obtaining and/or reviewing separately obtained history, Documenting clinical information in the electronic or other health record, Independently interpreting results (not separately reported) and communicating results to the patient/family/caregiver, or Care coordination (not separately reported).     Each patient to whom he or she provides medical services by telemedicine is:  (1) informed of the relationship between the physician and patient and the respective role of any other health care provider with respect to management of the patient; and (2) notified that he or she may decline to receive medical services by telemedicine and may withdraw from such care at any time.    History of Present Illness    CHIEF COMPLAINT:  - Ms. Rosenthal presents for a follow-up visit to discuss ongoing back pain and physical therapy progress.    HPI:  Ms. Rosenthal reports improvement in back pain since the last visit. She has been attending physical therapy and is approaching discharge. Significant physical improvement is noted, with only occasional mild discomfort during certain movements, particularly when bending. The pain is rated as 0.5-1 out of 10, described as very brief and minimal. Ms. Rosenthal has not required muscle relaxers.    Physical therapy has facilitated increased overall physical activity. Ms. Rosenthal is focusing on rebuilding a foundation to resume regular workouts, which the physical therapist indicates may be achieved within a few weeks. The frequency of therapy sessions has decreased from 3 times per week to once a week.    She still notes  occasional discomfort when rising from a full squat position, such as when storing items or lifting light objects, characterized as a pressure sensation near her tailbone that self-resolves after a short period of time. It has not worsened and is not every time but she wanted to note it in case it persisted.    Ms. Rosenthal reports sleep disturbances due to general stress, particularly related to current political events, and describes difficulty with mental relaxation. This sleep issue occurs intermittently, especially when stressed about upcoming events or current situations. Concern is expressed about the potential impact of insufficient sleep on overall well-being.    Stress management attempts include activities such as crocheting, reading, listening to audiobooks, and decluttering/cleaning. Ms. Rosenthal still has some difficulty managing stress and sleep issues. She's thought about seeking counseling. She talked with one during the COVID pandemic but is trying to balance costs given the current out-of-pocket cost for PT currently.    No major health events are reported since the last visit.    MEDICAL HISTORY:  - Back pain/muscle strain  - HPV: Negative screening  - Last Pap: Normal, with negative HPV screening    TEST RESULTS:  - Pap smear: Recently completed, normal results  - HPV screening: Recently completed, negative results        Answers submitted by the patient for this visit:  Review of Systems Questionnaire (Submitted on 1/21/2025)  activity change: Yes  unexpected weight change: No  neck pain: No  hearing loss: No  rhinorrhea: No  trouble swallowing: No  eye discharge: No  visual disturbance: No  chest tightness: No  wheezing: No  chest pain: No  palpitations: No  blood in stool: No  constipation: No  vomiting: No  diarrhea: No  polydipsia: No  polyuria: No  difficulty urinating: No  hematuria: No  menstrual problem: No  dysuria: No  joint swelling: No  arthralgias: No  headaches: No  weakness:  "No  confusion: No  dysphoric mood: No  Review of systems negative except for those noted in above HPI.    Patient Active Problem List   Diagnosis    Suspected fetal anomaly not found    ADHD (attention deficit hyperactivity disorder), combined type    PMDD (premenstrual dysphoric disorder)          Current Outpatient Medications:     dextroamphetamine-amphetamine (ADDERALL) 20 mg tablet, Take 1 tablet by mouth 3 (three) times daily., Disp: 90 tablet, Rfl: 0    dextroamphetamine-amphetamine (ADDERALL) 20 mg tablet, Take 1 tablet by mouth 3 (three) times daily., Disp: 90 tablet, Rfl: 0    dextroamphetamine-amphetamine (ADDERALL) 20 mg tablet, Take 1 tablet by mouth 3 (three) times daily., Disp: 90 tablet, Rfl: 0    doxycycline (VIBRAMYCIN) 100 MG Cap, Take 100 mg by mouth 2 (two) times daily., Disp: , Rfl:     FLUoxetine 10 MG capsule, Start taking one capsule daily starting on day 14 of cycle until menses., Disp: 90 capsule, Rfl: 2    nystatin-triamcinolone (MYCOLOG II) cream, Apply to affected area 2 times daily, Disp: 30 g, Rfl: 1    prenatal 25/iron fum/folic/dha (PRENATAL-1 ORAL), Take by mouth., Disp: , Rfl:     Lab Results   Component Value Date     (L) 06/29/2022    K 3.9 06/29/2022     06/29/2022    CO2 20 (L) 06/29/2022     (H) 06/29/2022    BUN 9 06/29/2022    CREATININE 0.8 06/29/2022    CALCIUM 8.9 06/29/2022    PROT 6.0 06/29/2022    ALBUMIN 2.6 (L) 06/29/2022    BILITOT 0.5 06/29/2022    ALKPHOS 100 06/29/2022    AST 32 06/29/2022    ALT 26 06/29/2022    ANIONGAP 10 06/29/2022    ESTGFRAFRICA >60 06/29/2022    EGFRNONAA >60 06/29/2022    WBC 14.11 (H) 06/30/2022    HGB 12.3 06/30/2022    HGB 13.6 06/28/2022    HCT 36.6 (L) 06/30/2022    MCV 94 06/30/2022     06/30/2022    TSH 2.030 03/22/2018       No results found for: "HGBA1C"  No results found for: "MICALBCREAT"  No results found for: "LDLCALC", "CHOL", "HDL", "TRIG"    Reviewed PMH, PSH, SH, FH and any changes have been " updated.    There were no vitals filed for this visit.  Objective:   Physical Exam    General: Pleasant. No acute distress. Well-developed. Well-nourished.  Eyes: EOMBI. Sclerae anicteric.  HENT: Normocephalic. Atraumatic. Nares patent.  Respiratory: Normal respiratory effort.   Neurological: Alert and lucid. No gross deficits.  Psychiatric: Normal mood. Normal affect.     Assessment:         ICD-10-CM ICD-9-CM   1. Acute bilateral low back pain without sciatica  M54.50 724.2     338.19   2. Other insomnia  G47.09 780.52   3. Anxiety  F41.9 300.00        Plan:   Assessment & Plan     Assessed patient's progress with physical therapy for back pain, noting significant improvement   Determined conservative management with continued physical therapy exercises appropriate at this time   Evaluated sleep issues, attributing them to general stress and political climate   Assessed need for mental health support, considering therapy options    BACK PAIN: Improved.  - Acknowledged improvement in patient's condition and discussed potential need for orthopedic consultation if symptoms persist.  - Advised to continue conservative therapy with physical therapist and physical therapy exercises at home, gradually building strength.  - Instructed to contact the office if back pain persists or worsens after 1-2 months to discuss potential orthopedic referral.    SLEEP ISSUES:  - Ms. Rosenthal reports having trouble sleeping at night due to general stress and difficulty turning off their brain.  - Discussed importance of sleep hygiene and developing a consistent bedtime routine.  - Suggested considering melatonin for sleep issues if needed.    STRESS AND ANXIETY:  - Ms. Rosenthal reports experiencing stress related to political events and daily life, affecting sleep.  - Discussed the possibility of seeing a therapist to manage stress and anxiety.  - Provided information on Employee Assistance Program (EAP) as a confidential resource for  mental health support.  - Referred to [facility] for behavioral health therapy options.        Acute bilateral low back pain without sciatica    Other insomnia    Anxiety  -     Ambulatory referral/consult to Behavioral Health; Future; Expected date: 01/28/2025        Chronic conditions status updated as per HPI.  Other than changes above, cont current medications and maintain follow up with specialists.      Follow up if symptoms worsen or fail to improve.         Aylin Gloria MD  [facility]    This note was generated with the assistance of ambient listening technology. Verbal consent was obtained by the patient and accompanying visitor(s) for the recording of patient appointment to facilitate this note. I attest to having reviewed and edited the generated note for accuracy, though some syntax or spelling errors may persist. Please contact the author of this note for any clarification.       There are no Patient Instructions on file for this visit.  All of your core healthy metrics are met.

## 2025-02-06 DIAGNOSIS — F90.2 ADHD (ATTENTION DEFICIT HYPERACTIVITY DISORDER), COMBINED TYPE: ICD-10-CM

## 2025-02-06 RX ORDER — DEXTROAMPHETAMINE SACCHARATE, AMPHETAMINE ASPARTATE, DEXTROAMPHETAMINE SULFATE AND AMPHETAMINE SULFATE 5; 5; 5; 5 MG/1; MG/1; MG/1; MG/1
1 TABLET ORAL 3 TIMES DAILY
Qty: 90 TABLET | Refills: 0 | Status: SHIPPED | OUTPATIENT
Start: 2025-02-06 | End: 2025-02-07 | Stop reason: SDUPTHER

## 2025-02-07 ENCOUNTER — OFFICE VISIT (OUTPATIENT)
Dept: PSYCHIATRY | Facility: CLINIC | Age: 35
End: 2025-02-07
Payer: COMMERCIAL

## 2025-02-07 DIAGNOSIS — F32.81 PMDD (PREMENSTRUAL DYSPHORIC DISORDER): ICD-10-CM

## 2025-02-07 DIAGNOSIS — F90.2 ADHD (ATTENTION DEFICIT HYPERACTIVITY DISORDER), COMBINED TYPE: ICD-10-CM

## 2025-02-07 PROCEDURE — 98006 SYNCH AUDIO-VIDEO EST MOD 30: CPT | Mod: 95,,, | Performed by: NURSE PRACTITIONER

## 2025-02-07 RX ORDER — DEXTROAMPHETAMINE SACCHARATE, AMPHETAMINE ASPARTATE, DEXTROAMPHETAMINE SULFATE AND AMPHETAMINE SULFATE 5; 5; 5; 5 MG/1; MG/1; MG/1; MG/1
1 TABLET ORAL 3 TIMES DAILY
Qty: 90 TABLET | Refills: 0 | Status: SHIPPED | OUTPATIENT
Start: 2025-02-07

## 2025-02-07 RX ORDER — DEXTROAMPHETAMINE SACCHARATE, AMPHETAMINE ASPARTATE, DEXTROAMPHETAMINE SULFATE AND AMPHETAMINE SULFATE 5; 5; 5; 5 MG/1; MG/1; MG/1; MG/1
1 TABLET ORAL 3 TIMES DAILY
Qty: 90 TABLET | Refills: 0 | Status: SHIPPED | OUTPATIENT
Start: 2025-03-06

## 2025-02-07 RX ORDER — FLUOXETINE 10 MG/1
CAPSULE ORAL
Qty: 90 CAPSULE | Refills: 2 | Status: SHIPPED | OUTPATIENT
Start: 2025-02-07

## 2025-02-07 RX ORDER — DEXTROAMPHETAMINE SACCHARATE, AMPHETAMINE ASPARTATE, DEXTROAMPHETAMINE SULFATE AND AMPHETAMINE SULFATE 5; 5; 5; 5 MG/1; MG/1; MG/1; MG/1
1 TABLET ORAL 3 TIMES DAILY
Qty: 90 TABLET | Refills: 0 | Status: SHIPPED | OUTPATIENT
Start: 2025-04-04

## 2025-02-07 NOTE — PROGRESS NOTES
Outpatient Psychiatry Follow-Up Visit (MD/NP)    2/7/2025    Clinical Status of Patient:  Outpatient (Ambulatory)    Chief Complaint:  Patsy Rosenthal is a 34 y.o. female who presents today for follow-up of attention problems.  Met with patient.     Last visit was: with resident. Chart and  reviewed.  The patient location is: home  The chief complaint leading to consultation is: ADHD    Visit type: audiovisual    Face to Face time with patient: 30 minutes  35 minutes of total time spent on the encounter, which includes face to face time and non-face to face time preparing to see the patient (eg, review of tests), Obtaining and/or reviewing separately obtained history, Documenting clinical information in the electronic or other health record, Independently interpreting results (not separately reported) and communicating results to the patient/family/caregiver, or Care coordination (not separately reported).     Each patient to whom he or she provides medical services by telemedicine is:  (1) informed of the relationship between the physician and patient and the respective role of any other health care provider with respect to management of the patient; and (2) notified that he or she may decline to receive medical services by telemedicine and may withdraw from such care at any time.    Interval History and Content of Current Session:  Current Psychiatric Medications/changes  Adderall 20 mg 2-3 x daily  Continue prozac 10mg daily PRN for PMDD - luteal phase dosing or PRN    Virtual Visit: Pt reports she has been having increased stress and anxiety over politics and worrying about another recession. Discussed increase in Prozac.Reports Adderall has been effective for ADHD. Thought processes are linear, clear, and organized. Denies SI/HI/AVH.     Psychotherapy  Target symptoms: distractability, lack of focus  Why chosen therapy is appropriate versus another modality: relevant to diagnosis  Outcome monitoring  "methods: self-report  Therapeutic intervention type: insight oriented psychotherapy  Topics discussed/themes: building skills sets for symptom management, symptom recognition  The patient's response to the intervention is accepting. The patient's progress toward treatment goals is good.   Duration of intervention: 15 minutes.    Psychiatric Review of Systems-is patient experiencing or having changes in  sleep: no  appetite: no  weight: no  energy/anergy: no  anhedonia: no  libido: did not assess  anxiety: no  guilty/hopelessness: no  concentration: yes  Irritability: no  Paranoia/delusions: no  S.I.B.s/risky behavior: no     Review of Systems   PSYCHIATRIC: Pertinant items are noted in the narrative.  CONSTITUTIONAL: No weight gain or loss.  MUSCULOSKELETAL: No pain or stiffness of the joints.  NEUROLOGIC: No weakness, sensory changes, seizures, confusion, memory loss, tremor or other abnormal movements.  RESPIRATORY: No shortness of breath.  CARDIOVASCULAR: No tachycardia or chest pain.  GASTROINTESTINAL: No nausea, vomiting, pain, constipation or diarrhea.     Past Medical, Family and Social History: The patient's past medical, family and social history have been reviewed and updated as appropriate within the electronic medical record - see encounter notes.     Compliance: yes     Side effects: None     Risk Parameters:  Patient reports no suicidal ideation  Patient reports no homicidal ideation  Patient reports no self-injurious behavior  Patient reports no violent behavior     Exam (detailed: at least 9 elements; comprehensive: all 15 elements)   Constitutional  Vitals:  Most recent vital signs, dated less than 90 days prior to this appointment, were reviewed.      Ht: 5' 6" (1.676 m)>365 days  Last Wt: 92.3 kg>30 days  BP: 111/57>1 day  Pulse: 62>1 day   General:  unremarkable, age appropriate      Musculoskeletal  Muscle Strength/Tone:  not examined   Gait & Station:  non-ataxic      Psychiatric  Speech:  no " latency; no press   Mood & Affect:  euthymic  congruent and appropriate   Thought Process:  normal and logical   Associations:  intact   Thought Content:  no suicidality, no homicidality, delusions, or paranoia   Insight:  intact, has awareness of illness   Judgement: behavior is adequate to circumstances   Orientation:  grossly intact   Memory: intact for content of interview   Language: grossly intact   Attention Span & Concentration:  able to focus   Fund of Knowledge:  intact and appropriate to age and level of education         Assessment and Diagnosis   Status/Progress: Based on the examination today, the patient's problem(s) is/are well controlled.  New problems have not been presented today.   Co-morbidities and Lack of compliance are not complicating management of the primary condition.  There are no active rule-out diagnoses for this patient at this time.     General Impression:       ICD-10-CM ICD-9-CM   1. ADHD (attention deficit hyperactivity disorder), combined type  F90.2 314.01   2. PMDD (premenstrual dysphoric disorder)  F32.81 625.4     Intervention/Counseling/Treatment Plan   Medication Management: Continue current medications. The risks and benefits of medication were discussed with the patient.  Adderall 20 mg 2-3 x daily  Continue prozac 10mg daily PRN for PMDD - luteal phase dosing or PRN (may Increase to 20 mg)    Return to Clinic: 3 months    Risks, benefits, side effects and alternative treatments discussed with patient. Patient agrees with the current plan as documented.  Encouraged Patient to keep future appointments.  Take medications as prescribed and abstain from substance abuse.  Pt to present to ED for thoughts to harm herself or others

## 2025-03-24 ENCOUNTER — PATIENT MESSAGE (OUTPATIENT)
Dept: INTERNAL MEDICINE | Facility: CLINIC | Age: 35
End: 2025-03-24

## 2025-03-24 ENCOUNTER — TELEPHONE (OUTPATIENT)
Dept: INTERNAL MEDICINE | Facility: CLINIC | Age: 35
End: 2025-03-24
Payer: COMMERCIAL

## 2025-03-24 ENCOUNTER — OFFICE VISIT (OUTPATIENT)
Dept: INTERNAL MEDICINE | Facility: CLINIC | Age: 35
End: 2025-03-24
Payer: COMMERCIAL

## 2025-03-24 VITALS — HEIGHT: 67 IN | BODY MASS INDEX: 29.07 KG/M2 | WEIGHT: 185.19 LBS

## 2025-03-24 DIAGNOSIS — F90.2 ADHD (ATTENTION DEFICIT HYPERACTIVITY DISORDER), COMBINED TYPE: Primary | ICD-10-CM

## 2025-03-24 DIAGNOSIS — G89.29 CHRONIC BILATERAL LOW BACK PAIN WITHOUT SCIATICA: ICD-10-CM

## 2025-03-24 DIAGNOSIS — M54.50 CHRONIC BILATERAL LOW BACK PAIN WITHOUT SCIATICA: ICD-10-CM

## 2025-03-25 ENCOUNTER — TELEPHONE (OUTPATIENT)
Dept: INTERNAL MEDICINE | Facility: CLINIC | Age: 35
End: 2025-03-25
Payer: COMMERCIAL

## 2025-03-25 NOTE — PROGRESS NOTES
Subjective:      Patient ID: Patsy Rosenthal is a 34 y.o. female.    The patient location is: [place]  Chief Complaint: No chief complaint on file.      Visit type: audiovisual    Face to Face time with patient: 10  13 minutes of total time spent on the encounter, which includes medical decision making, face to face time and non-face to face time preparing to see the patient (eg, review of tests), obtaining and/or reviewing separately obtained history, documenting clinical information in the electronic or other health record, independently interpreting results (not separately reported) and communicating results to the patient/family/caregiver, or care coordination (not separately reported).     Each patient to whom he or she provides medical services by telemedicine is:  (1) informed of the relationship between the physician and patient and the respective role of any other health care provider with respect to management of the patient; and (2) notified that he or she may decline to receive medical services by telemedicine and may withdraw from such care at any time.    History of Present Illness    CHIEF COMPLAINT:  - Ms. Rosenthal presents for medical clearance to participate in scuba diving activities.    HPI:  Ms. Rosenthal has been preparing for scuba diving certification over the past 2 weekends, completing classwork and practicing with equipment. She has experience with snorkeling and swimming but not scuba diving. She has practiced in a controlled indoor environment, reaching a depth of 15 feet without issues. She is planning an open water dive with potential depths up to 115 feet, supervised by professionals. Her back pain has been manageable with the scuba equipment. She has transitioned to swimming for exercise post-physical therapy, which has alleviated her back pain. She intends to participate in a dive with whale sharks at the [facility] inside the tank at the end of April, considering it a  "significant personal goal. She is also planning a dive in a natural spring setting.    She denies new chest pain and trouble breathing.         Review of systems negative except for those noted in above HPI.    Problem List[1]     Current Medications[2]  Review of patient's allergies indicates:  No Known Allergies    Lab Results   Component Value Date     (L) 06/29/2022    K 3.9 06/29/2022     06/29/2022    CO2 20 (L) 06/29/2022     (H) 06/29/2022    BUN 9 06/29/2022    CREATININE 0.8 06/29/2022    CALCIUM 8.9 06/29/2022    PROT 6.0 06/29/2022    ALBUMIN 2.6 (L) 06/29/2022    BILITOT 0.5 06/29/2022    ALKPHOS 100 06/29/2022    AST 32 06/29/2022    ALT 26 06/29/2022    ANIONGAP 10 06/29/2022    ESTGFRAFRICA >60 06/29/2022    EGFRNONAA >60 06/29/2022    WBC 14.11 (H) 06/30/2022    HGB 12.3 06/30/2022    HGB 13.6 06/28/2022    HCT 36.6 (L) 06/30/2022    MCV 94 06/30/2022     06/30/2022    TSH 2.030 03/22/2018       No results found for: "HGBA1C"  No results found for: "MICALBCREAT"  No results found for: "LDLCALC", "CHOL", "HDL", "TRIG"    Reviewed PMH, PSH, SH, FH and any changes have been updated.    Vitals:    03/24/25 1610   Weight: 84 kg (185 lb 3 oz)   Height: 5' 7" (1.702 m)   PainSc: 0-No pain     Objective:   Physical Exam    Physical Exam  Constitutional:       Appearance: Normal appearance.   HENT:      Head: Normocephalic.      Nose: Nose normal.   Eyes:      Extraocular Movements: Extraocular movements intact.      Conjunctiva/sclera: Conjunctivae normal.   Pulmonary:      Effort: Pulmonary effort is normal. No respiratory distress.   Neurological:      General: No focal deficit present.      Mental Status: She is alert.   Psychiatric:         Mood and Affect: Mood normal.         Behavior: Behavior normal.         Thought Content: Thought content normal.          Assessment:         ICD-10-CM ICD-9-CM   1. ADHD (attention deficit hyperactivity disorder), combined type  F90.2 " 314.01   2. Chronic bilateral low back pain without sciatica  M54.50 724.2    G89.29 338.29        Plan:   Assessment & Plan     Reviewed scuba diving certification process and current health status.   Assessed experience with diving equipment and underwater activities.   Considered current medications and their impact on diving safety.   Evaluated BP and heart rate, determining they are adequately controlled for diving.   Determined medically fit for scuba diving certification and planned dive activities.    ATTENTION-DEFICIT HYPERACTIVITY DISORDER: Controlled.  - Acknowledged that the patient is on a stimulant medication  - Continued prescription of Adderall for ADHD management.  - Completed scuba diving form    BACK PAIN: Improved.  - Reviewed physical therapy notes.        ADHD (attention deficit hyperactivity disorder), combined type    Chronic bilateral low back pain without sciatica        Chronic conditions status updated as per HPI.  Other than changes above, cont current medications and maintain follow up with specialists.      Follow up if symptoms worsen or fail to improve.         Aylin Gloria MD  [facility]    This note was generated with the assistance of ambient listening technology. Verbal consent was obtained by the patient and accompanying visitor(s) for the recording of patient appointment to facilitate this note. I attest to having reviewed and edited the generated note for accuracy, though some syntax or spelling errors may persist. Please contact the author of this note for any clarification.       There are no Patient Instructions on file for this visit.  All of your core healthy metrics are met.                              Answers submitted by the patient for this visit:  Review of Systems Questionnaire (Submitted on 3/24/2025)  activity change: Yes  unexpected weight change: No  neck pain: No  hearing loss: No  rhinorrhea: No  trouble swallowing: No  eye discharge:  No  visual disturbance: No  chest tightness: No  wheezing: No  chest pain: No  palpitations: No  blood in stool: No  constipation: No  vomiting: No  diarrhea: No  polydipsia: No  polyuria: No  difficulty urinating: No  hematuria: No  menstrual problem: No  dysuria: No  joint swelling: No  arthralgias: No  headaches: No  weakness: No  confusion: No  dysphoric mood: No         [1]   Patient Active Problem List  Diagnosis    Suspected fetal anomaly not found    ADHD (attention deficit hyperactivity disorder), combined type    PMDD (premenstrual dysphoric disorder)   [2]   Current Outpatient Medications:     [START ON 4/4/2025] dextroamphetamine-amphetamine (ADDERALL) 20 mg tablet, Take 1 tablet by mouth 3 (three) times daily., Disp: 90 tablet, Rfl: 0    dextroamphetamine-amphetamine (ADDERALL) 20 mg tablet, Take 1 tablet by mouth 3 (three) times daily., Disp: 90 tablet, Rfl: 0    dextroamphetamine-amphetamine (ADDERALL) 20 mg tablet, Take 1 tablet by mouth 3 (three) times daily., Disp: 90 tablet, Rfl: 0    FLUoxetine 10 MG capsule, Start taking one capsule daily starting on day 14 of cycle until menses., Disp: 90 capsule, Rfl: 2    nystatin-triamcinolone (MYCOLOG II) cream, Apply to affected area 2 times daily, Disp: 30 g, Rfl: 1    prenatal 25/iron fum/folic/dha (PRENATAL-1 ORAL), Take by mouth., Disp: , Rfl:

## 2025-05-18 ENCOUNTER — PATIENT MESSAGE (OUTPATIENT)
Dept: PSYCHIATRY | Facility: CLINIC | Age: 35
End: 2025-05-18
Payer: COMMERCIAL

## 2025-05-18 ENCOUNTER — PATIENT MESSAGE (OUTPATIENT)
Dept: INTERNAL MEDICINE | Facility: CLINIC | Age: 35
End: 2025-05-18
Payer: COMMERCIAL

## 2025-05-20 NOTE — TELEPHONE ENCOUNTER
Patient said she tried scheduling an appt to get a covid vaccine through the vaccine schedule online but was unable to. She was told she can walk up to any [facility] pharmacy between 8-430pm Monday - Friday to get a vaccine. She said she will do that and thanks for calling

## 2025-06-10 DIAGNOSIS — F90.2 ADHD (ATTENTION DEFICIT HYPERACTIVITY DISORDER), COMBINED TYPE: ICD-10-CM

## 2025-06-10 RX ORDER — DEXTROAMPHETAMINE SACCHARATE, AMPHETAMINE ASPARTATE, DEXTROAMPHETAMINE SULFATE AND AMPHETAMINE SULFATE 5; 5; 5; 5 MG/1; MG/1; MG/1; MG/1
1 TABLET ORAL 3 TIMES DAILY
Qty: 90 TABLET | Refills: 0 | Status: SHIPPED | OUTPATIENT
Start: 2025-06-10

## 2025-06-19 ENCOUNTER — OFFICE VISIT (OUTPATIENT)
Dept: PSYCHIATRY | Facility: CLINIC | Age: 35
End: 2025-06-19
Payer: COMMERCIAL

## 2025-06-19 DIAGNOSIS — F32.81 PMDD (PREMENSTRUAL DYSPHORIC DISORDER): ICD-10-CM

## 2025-06-19 DIAGNOSIS — F90.2 ADHD (ATTENTION DEFICIT HYPERACTIVITY DISORDER), COMBINED TYPE: ICD-10-CM

## 2025-06-19 PROCEDURE — 98006 SYNCH AUDIO-VIDEO EST MOD 30: CPT | Mod: 95,,, | Performed by: NURSE PRACTITIONER

## 2025-06-19 RX ORDER — FLUOXETINE 10 MG/1
CAPSULE ORAL
Qty: 90 CAPSULE | Refills: 2 | Status: SHIPPED | OUTPATIENT
Start: 2025-06-19

## 2025-06-19 RX ORDER — DEXTROAMPHETAMINE SACCHARATE, AMPHETAMINE ASPARTATE, DEXTROAMPHETAMINE SULFATE AND AMPHETAMINE SULFATE 5; 5; 5; 5 MG/1; MG/1; MG/1; MG/1
1 TABLET ORAL 3 TIMES DAILY
Qty: 90 TABLET | Refills: 0 | Status: SHIPPED | OUTPATIENT
Start: 2025-07-09

## 2025-06-19 RX ORDER — DEXTROAMPHETAMINE SACCHARATE, AMPHETAMINE ASPARTATE, DEXTROAMPHETAMINE SULFATE AND AMPHETAMINE SULFATE 5; 5; 5; 5 MG/1; MG/1; MG/1; MG/1
1 TABLET ORAL 3 TIMES DAILY
Qty: 90 TABLET | Refills: 0 | Status: SHIPPED | OUTPATIENT
Start: 2025-08-07

## 2025-06-19 RX ORDER — DEXTROAMPHETAMINE SACCHARATE, AMPHETAMINE ASPARTATE, DEXTROAMPHETAMINE SULFATE AND AMPHETAMINE SULFATE 5; 5; 5; 5 MG/1; MG/1; MG/1; MG/1
1 TABLET ORAL 3 TIMES DAILY
Qty: 90 TABLET | Refills: 0 | Status: SHIPPED | OUTPATIENT
Start: 2025-09-05

## 2025-06-19 NOTE — PROGRESS NOTES
Outpatient Psychiatry Follow-Up Visit (MD/NP)    6/19/2025    Clinical Status of Patient:  Outpatient (Ambulatory)    Chief Complaint:  Patsy Rosenthal is a 34 y.o. female who presents today for follow-up of attention problems.  Met with patient.     Last visit was: with resident. Chart and  reviewed.  The patient location is: home  The chief complaint leading to consultation is: ADHD    Visit type: audiovisual    Face to Face time with patient: 30 minutes  35 minutes of total time spent on the encounter, which includes face to face time and non-face to face time preparing to see the patient (eg, review of tests), Obtaining and/or reviewing separately obtained history, Documenting clinical information in the electronic or other health record, Independently interpreting results (not separately reported) and communicating results to the patient/family/caregiver, or Care coordination (not separately reported).     Each patient to whom he or she provides medical services by telemedicine is:  (1) informed of the relationship between the physician and patient and the respective role of any other health care provider with respect to management of the patient; and (2) notified that he or she may decline to receive medical services by telemedicine and may withdraw from such care at any time.    Interval History and Content of Current Session:  Current Psychiatric Medications/changes  Adderall 20 mg 2-3 x daily  Continue prozac 10mg daily PRN for PMDD - luteal phase dosing or PRN    Virtual Visit: Pt reports she has been having increased stress and anxiety over politics and worrying about another recession. Discussed increase in Prozac.Reports Adderall has been effective for ADHD. Thought processes are linear, clear, and organized. Denies SI/HI/AVH.     Psychotherapy  Target symptoms: distractability, lack of focus  Why chosen therapy is appropriate versus another modality: relevant to diagnosis  Outcome monitoring  "methods: self-report  Therapeutic intervention type: insight oriented psychotherapy  Topics discussed/themes: building skills sets for symptom management, symptom recognition  The patient's response to the intervention is accepting. The patient's progress toward treatment goals is good.   Duration of intervention: 15 minutes.    Psychiatric Review of Systems-is patient experiencing or having changes in  sleep: no  appetite: no  weight: no  energy/anergy: no  anhedonia: no  libido: did not assess  anxiety: no  guilty/hopelessness: no  concentration: yes  Irritability: no  Paranoia/delusions: no  S.I.B.s/risky behavior: no     Review of Systems   PSYCHIATRIC: Pertinant items are noted in the narrative.  CONSTITUTIONAL: No weight gain or loss.  MUSCULOSKELETAL: No pain or stiffness of the joints.  NEUROLOGIC: No weakness, sensory changes, seizures, confusion, memory loss, tremor or other abnormal movements.  RESPIRATORY: No shortness of breath.  CARDIOVASCULAR: No tachycardia or chest pain.  GASTROINTESTINAL: No nausea, vomiting, pain, constipation or diarrhea.     Past Medical, Family and Social History: The patient's past medical, family and social history have been reviewed and updated as appropriate within the electronic medical record - see encounter notes.     Compliance: yes     Side effects: None     Risk Parameters:  Patient reports no suicidal ideation  Patient reports no homicidal ideation  Patient reports no self-injurious behavior  Patient reports no violent behavior     Exam (detailed: at least 9 elements; comprehensive: all 15 elements)   Constitutional  Vitals:  Most recent vital signs, dated less than 90 days prior to this appointment, were reviewed.      Ht: 5' 6" (1.676 m)>365 days  Last Wt: 92.3 kg>30 days  BP: 111/57>1 day  Pulse: 62>1 day   General:  unremarkable, age appropriate      Musculoskeletal  Muscle Strength/Tone:  not examined   Gait & Station:  non-ataxic      Psychiatric  Speech:  no " latency; no press   Mood & Affect:  euthymic  congruent and appropriate   Thought Process:  normal and logical   Associations:  intact   Thought Content:  no suicidality, no homicidality, delusions, or paranoia   Insight:  intact, has awareness of illness   Judgement: behavior is adequate to circumstances   Orientation:  grossly intact   Memory: intact for content of interview   Language: grossly intact   Attention Span & Concentration:  able to focus   Fund of Knowledge:  intact and appropriate to age and level of education         Assessment and Diagnosis   Status/Progress: Based on the examination today, the patient's problem(s) is/are well controlled.  New problems have not been presented today.   Co-morbidities and Lack of compliance are not complicating management of the primary condition.  There are no active rule-out diagnoses for this patient at this time.     General Impression:       ICD-10-CM ICD-9-CM   1. PMDD (premenstrual dysphoric disorder)  F32.81 625.4   2. ADHD (attention deficit hyperactivity disorder), combined type  F90.2 314.01       Intervention/Counseling/Treatment Plan   Medication Management: Continue current medications. The risks and benefits of medication were discussed with the patient.  Adderall 20 mg 2-3 x daily  Continue prozac 10mg daily PRN for PMDD - luteal phase dosing or PRN (may Increase to 20 mg)    Return to Clinic: 3 months    Risks, benefits, side effects and alternative treatments discussed with patient. Patient agrees with the current plan as documented.  Encouraged Patient to keep future appointments.  Take medications as prescribed and abstain from substance abuse.  Pt to present to ED for thoughts to harm herself or others